# Patient Record
Sex: FEMALE | Race: WHITE | Employment: OTHER | URBAN - METROPOLITAN AREA
[De-identification: names, ages, dates, MRNs, and addresses within clinical notes are randomized per-mention and may not be internally consistent; named-entity substitution may affect disease eponyms.]

---

## 2017-02-15 ENCOUNTER — OFFICE VISIT (OUTPATIENT)
Dept: FAMILY MEDICINE CLINIC | Age: 72
End: 2017-02-15

## 2017-02-15 VITALS
SYSTOLIC BLOOD PRESSURE: 124 MMHG | HEART RATE: 66 BPM | HEIGHT: 64 IN | DIASTOLIC BLOOD PRESSURE: 80 MMHG | WEIGHT: 108 LBS | BODY MASS INDEX: 18.44 KG/M2 | RESPIRATION RATE: 20 BRPM

## 2017-02-15 DIAGNOSIS — E53.8 B12 DEFICIENCY: ICD-10-CM

## 2017-02-15 DIAGNOSIS — E78.00 PURE HYPERCHOLESTEROLEMIA: Primary | ICD-10-CM

## 2017-02-15 DIAGNOSIS — M81.0 OSTEOPOROSIS: ICD-10-CM

## 2017-02-15 DIAGNOSIS — Z85.3 HX OF BREAST CANCER: ICD-10-CM

## 2017-02-15 DIAGNOSIS — F17.200 TOBACCO USE DISORDER: ICD-10-CM

## 2017-02-15 DIAGNOSIS — F31.9 BIPOLAR 1 DISORDER (HCC): ICD-10-CM

## 2017-02-15 DIAGNOSIS — E55.9 VITAMIN D DEFICIENCY: ICD-10-CM

## 2017-02-15 DIAGNOSIS — E03.9 ACQUIRED HYPOTHYROIDISM: ICD-10-CM

## 2017-02-15 DIAGNOSIS — R60.0 BILATERAL EDEMA OF LOWER EXTREMITY: ICD-10-CM

## 2017-02-15 DIAGNOSIS — F10.11 NONDEPENDENT ALCOHOL ABUSE, IN REMISSION: ICD-10-CM

## 2017-02-15 DIAGNOSIS — E78.00 PURE HYPERCHOLESTEROLEMIA: ICD-10-CM

## 2017-02-15 LAB
A/G RATIO: 2.6 (ref 1.1–2.2)
ALBUMIN SERPL-MCNC: 5 G/DL (ref 3.4–5)
ALP BLD-CCNC: 82 U/L (ref 40–129)
ALT SERPL-CCNC: 10 U/L (ref 10–40)
ANION GAP SERPL CALCULATED.3IONS-SCNC: 13 MMOL/L (ref 3–16)
AST SERPL-CCNC: 17 U/L (ref 15–37)
BILIRUB SERPL-MCNC: 0.3 MG/DL (ref 0–1)
BUN BLDV-MCNC: 13 MG/DL (ref 7–20)
CALCIUM SERPL-MCNC: 9.9 MG/DL (ref 8.3–10.6)
CHLORIDE BLD-SCNC: 103 MMOL/L (ref 99–110)
CHOLESTEROL, TOTAL: 174 MG/DL (ref 0–199)
CO2: 29 MMOL/L (ref 21–32)
CREAT SERPL-MCNC: 0.8 MG/DL (ref 0.6–1.2)
GFR AFRICAN AMERICAN: >60
GFR NON-AFRICAN AMERICAN: >60
GLOBULIN: 1.9 G/DL
GLUCOSE BLD-MCNC: 77 MG/DL (ref 70–99)
HCT VFR BLD CALC: 45.4 % (ref 36–48)
HDLC SERPL-MCNC: 88 MG/DL (ref 40–60)
HEMOGLOBIN: 14.9 G/DL (ref 12–16)
LDL CHOLESTEROL CALCULATED: 76 MG/DL
MCH RBC QN AUTO: 31.5 PG (ref 26–34)
MCHC RBC AUTO-ENTMCNC: 32.8 G/DL (ref 31–36)
MCV RBC AUTO: 96 FL (ref 80–100)
PDW BLD-RTO: 13.5 % (ref 12.4–15.4)
PLATELET # BLD: 168 K/UL (ref 135–450)
PMV BLD AUTO: 9.8 FL (ref 5–10.5)
POTASSIUM SERPL-SCNC: 4.9 MMOL/L (ref 3.5–5.1)
RBC # BLD: 4.72 M/UL (ref 4–5.2)
SODIUM BLD-SCNC: 145 MMOL/L (ref 136–145)
TOTAL PROTEIN: 6.9 G/DL (ref 6.4–8.2)
TRIGL SERPL-MCNC: 50 MG/DL (ref 0–150)
TSH SERPL DL<=0.05 MIU/L-ACNC: 3.71 UIU/ML (ref 0.27–4.2)
VITAMIN B-12: 497 PG/ML (ref 211–911)
VLDLC SERPL CALC-MCNC: 10 MG/DL
WBC # BLD: 5.7 K/UL (ref 4–11)

## 2017-02-15 PROCEDURE — G8484 FLU IMMUNIZE NO ADMIN: HCPCS | Performed by: FAMILY MEDICINE

## 2017-02-15 PROCEDURE — G8427 DOCREV CUR MEDS BY ELIG CLIN: HCPCS | Performed by: FAMILY MEDICINE

## 2017-02-15 PROCEDURE — 4004F PT TOBACCO SCREEN RCVD TLK: CPT | Performed by: FAMILY MEDICINE

## 2017-02-15 PROCEDURE — 99214 OFFICE O/P EST MOD 30 MIN: CPT | Performed by: FAMILY MEDICINE

## 2017-02-15 PROCEDURE — G8419 CALC BMI OUT NRM PARAM NOF/U: HCPCS | Performed by: FAMILY MEDICINE

## 2017-02-15 PROCEDURE — G8399 PT W/DXA RESULTS DOCUMENT: HCPCS | Performed by: FAMILY MEDICINE

## 2017-02-15 PROCEDURE — 1123F ACP DISCUSS/DSCN MKR DOCD: CPT | Performed by: FAMILY MEDICINE

## 2017-02-15 PROCEDURE — 4005F PHARM THX FOR OP RXD: CPT | Performed by: FAMILY MEDICINE

## 2017-02-15 PROCEDURE — 3014F SCREEN MAMMO DOC REV: CPT | Performed by: FAMILY MEDICINE

## 2017-02-15 PROCEDURE — 4040F PNEUMOC VAC/ADMIN/RCVD: CPT | Performed by: FAMILY MEDICINE

## 2017-02-15 PROCEDURE — 3017F COLORECTAL CA SCREEN DOC REV: CPT | Performed by: FAMILY MEDICINE

## 2017-02-15 PROCEDURE — 1090F PRES/ABSN URINE INCON ASSESS: CPT | Performed by: FAMILY MEDICINE

## 2017-07-10 ENCOUNTER — TELEPHONE (OUTPATIENT)
Dept: FAMILY MEDICINE CLINIC | Age: 72
End: 2017-07-10

## 2017-07-31 ENCOUNTER — OFFICE VISIT (OUTPATIENT)
Dept: FAMILY MEDICINE CLINIC | Age: 72
End: 2017-07-31

## 2017-07-31 VITALS
HEIGHT: 64 IN | DIASTOLIC BLOOD PRESSURE: 60 MMHG | SYSTOLIC BLOOD PRESSURE: 118 MMHG | HEART RATE: 80 BPM | WEIGHT: 100 LBS | RESPIRATION RATE: 22 BRPM | BODY MASS INDEX: 17.07 KG/M2

## 2017-07-31 DIAGNOSIS — R60.0 BILATERAL EDEMA OF LOWER EXTREMITY: ICD-10-CM

## 2017-07-31 DIAGNOSIS — E03.9 ACQUIRED HYPOTHYROIDISM: ICD-10-CM

## 2017-07-31 DIAGNOSIS — S06.9X1A: Primary | ICD-10-CM

## 2017-07-31 DIAGNOSIS — M80.00XA OSTEOPOROSIS WITH CURRENT PATHOLOGICAL FRACTURE, UNSPECIFIED OSTEOPOROSIS TYPE, INITIAL ENCOUNTER: ICD-10-CM

## 2017-07-31 DIAGNOSIS — F10.11 NONDEPENDENT ALCOHOL ABUSE, IN REMISSION: ICD-10-CM

## 2017-07-31 DIAGNOSIS — K52.9 CHRONIC DIARRHEA: ICD-10-CM

## 2017-07-31 DIAGNOSIS — E87.6 HYPOKALEMIA: ICD-10-CM

## 2017-07-31 DIAGNOSIS — S02.19XA CLOSED FRACTURE OF TEMPORAL BONE, INITIAL ENCOUNTER (HCC): ICD-10-CM

## 2017-07-31 PROBLEM — S06.9XAA TRAUMATIC BRAIN INJURY (HCC): Status: ACTIVE | Noted: 2017-07-31

## 2017-07-31 PROBLEM — R60.9 CHRONIC EDEMA: Status: ACTIVE | Noted: 2017-07-31

## 2017-07-31 PROBLEM — R60.9 CHRONIC EDEMA: Status: RESOLVED | Noted: 2017-07-31 | Resolved: 2017-07-31

## 2017-07-31 LAB
ANION GAP SERPL CALCULATED.3IONS-SCNC: 19 MMOL/L (ref 3–16)
BUN BLDV-MCNC: 11 MG/DL (ref 7–20)
CALCIUM SERPL-MCNC: 9.2 MG/DL (ref 8.3–10.6)
CHLORIDE BLD-SCNC: 101 MMOL/L (ref 99–110)
CO2: 24 MMOL/L (ref 21–32)
CREAT SERPL-MCNC: 0.8 MG/DL (ref 0.6–1.2)
GFR AFRICAN AMERICAN: >60
GFR NON-AFRICAN AMERICAN: >60
GLUCOSE BLD-MCNC: 89 MG/DL (ref 70–99)
POTASSIUM SERPL-SCNC: 4.6 MMOL/L (ref 3.5–5.1)
SODIUM BLD-SCNC: 144 MMOL/L (ref 136–145)
TSH SERPL DL<=0.05 MIU/L-ACNC: 2.75 UIU/ML (ref 0.27–4.2)

## 2017-07-31 PROCEDURE — G8419 CALC BMI OUT NRM PARAM NOF/U: HCPCS | Performed by: FAMILY MEDICINE

## 2017-07-31 PROCEDURE — 3017F COLORECTAL CA SCREEN DOC REV: CPT | Performed by: FAMILY MEDICINE

## 2017-07-31 PROCEDURE — 99214 OFFICE O/P EST MOD 30 MIN: CPT | Performed by: FAMILY MEDICINE

## 2017-07-31 PROCEDURE — 3014F SCREEN MAMMO DOC REV: CPT | Performed by: FAMILY MEDICINE

## 2017-07-31 PROCEDURE — G8427 DOCREV CUR MEDS BY ELIG CLIN: HCPCS | Performed by: FAMILY MEDICINE

## 2017-07-31 PROCEDURE — 4005F PHARM THX FOR OP RXD: CPT | Performed by: FAMILY MEDICINE

## 2017-07-31 PROCEDURE — G8399 PT W/DXA RESULTS DOCUMENT: HCPCS | Performed by: FAMILY MEDICINE

## 2017-07-31 PROCEDURE — 1090F PRES/ABSN URINE INCON ASSESS: CPT | Performed by: FAMILY MEDICINE

## 2017-07-31 PROCEDURE — 4040F PNEUMOC VAC/ADMIN/RCVD: CPT | Performed by: FAMILY MEDICINE

## 2017-07-31 PROCEDURE — 4004F PT TOBACCO SCREEN RCVD TLK: CPT | Performed by: FAMILY MEDICINE

## 2017-07-31 PROCEDURE — 1123F ACP DISCUSS/DSCN MKR DOCD: CPT | Performed by: FAMILY MEDICINE

## 2017-07-31 RX ORDER — TRAMADOL HYDROCHLORIDE 50 MG/1
50 TABLET ORAL EVERY 6 HOURS PRN
COMMUNITY
End: 2017-07-31

## 2017-07-31 RX ORDER — ALENDRONATE SODIUM 70 MG/1
70 TABLET ORAL
Qty: 4 TABLET | Refills: 5 | Status: SHIPPED | OUTPATIENT
Start: 2017-07-31 | End: 2018-06-18

## 2017-08-02 LAB — CELIAC PANEL: 4 UNITS (ref 0–19)

## 2017-08-03 ENCOUNTER — TELEPHONE (OUTPATIENT)
Dept: FAMILY MEDICINE CLINIC | Age: 72
End: 2017-08-03

## 2017-08-07 ENCOUNTER — OFFICE VISIT (OUTPATIENT)
Dept: FAMILY MEDICINE CLINIC | Age: 72
End: 2017-08-07

## 2017-08-07 VITALS
DIASTOLIC BLOOD PRESSURE: 72 MMHG | HEART RATE: 80 BPM | SYSTOLIC BLOOD PRESSURE: 114 MMHG | RESPIRATION RATE: 14 BRPM | BODY MASS INDEX: 15.54 KG/M2 | TEMPERATURE: 98.6 F | WEIGHT: 91 LBS | HEIGHT: 64 IN

## 2017-08-07 DIAGNOSIS — R29.6 RECURRENT FALLS: Primary | ICD-10-CM

## 2017-08-07 DIAGNOSIS — S01.01XA SCALP LACERATION, INITIAL ENCOUNTER: ICD-10-CM

## 2017-08-07 DIAGNOSIS — R63.4 WEIGHT LOSS: ICD-10-CM

## 2017-08-07 DIAGNOSIS — K52.9 CHRONIC DIARRHEA: ICD-10-CM

## 2017-08-07 PROCEDURE — 1090F PRES/ABSN URINE INCON ASSESS: CPT | Performed by: FAMILY MEDICINE

## 2017-08-07 PROCEDURE — 3017F COLORECTAL CA SCREEN DOC REV: CPT | Performed by: FAMILY MEDICINE

## 2017-08-07 PROCEDURE — 3014F SCREEN MAMMO DOC REV: CPT | Performed by: FAMILY MEDICINE

## 2017-08-07 PROCEDURE — 99213 OFFICE O/P EST LOW 20 MIN: CPT | Performed by: FAMILY MEDICINE

## 2017-08-07 PROCEDURE — G8419 CALC BMI OUT NRM PARAM NOF/U: HCPCS | Performed by: FAMILY MEDICINE

## 2017-08-07 PROCEDURE — G8427 DOCREV CUR MEDS BY ELIG CLIN: HCPCS | Performed by: FAMILY MEDICINE

## 2017-08-07 PROCEDURE — 4004F PT TOBACCO SCREEN RCVD TLK: CPT | Performed by: FAMILY MEDICINE

## 2017-08-07 PROCEDURE — 90714 TD VACC NO PRESV 7 YRS+ IM: CPT | Performed by: FAMILY MEDICINE

## 2017-08-07 PROCEDURE — G8399 PT W/DXA RESULTS DOCUMENT: HCPCS | Performed by: FAMILY MEDICINE

## 2017-08-07 PROCEDURE — 1123F ACP DISCUSS/DSCN MKR DOCD: CPT | Performed by: FAMILY MEDICINE

## 2017-08-07 PROCEDURE — 4040F PNEUMOC VAC/ADMIN/RCVD: CPT | Performed by: FAMILY MEDICINE

## 2017-08-07 PROCEDURE — 90471 IMMUNIZATION ADMIN: CPT | Performed by: FAMILY MEDICINE

## 2017-08-08 ENCOUNTER — TELEPHONE (OUTPATIENT)
Dept: FAMILY MEDICINE CLINIC | Age: 72
End: 2017-08-08

## 2017-08-09 ENCOUNTER — TELEPHONE (OUTPATIENT)
Dept: FAMILY MEDICINE CLINIC | Age: 72
End: 2017-08-09

## 2017-08-09 DIAGNOSIS — T14.8XXA SKIN AVULSION: ICD-10-CM

## 2017-08-09 DIAGNOSIS — R29.6 FREQUENT FALLS: Primary | ICD-10-CM

## 2017-08-10 ENCOUNTER — HOSPITAL ENCOUNTER (OUTPATIENT)
Dept: OTHER | Age: 72
Discharge: HOME | End: 2017-08-10
Attending: INTERNAL MEDICINE | Admitting: INTERNAL MEDICINE

## 2017-08-11 ENCOUNTER — TELEPHONE (OUTPATIENT)
Dept: FAMILY MEDICINE CLINIC | Age: 72
End: 2017-08-11

## 2017-08-14 ENCOUNTER — CARE COORDINATION (OUTPATIENT)
Dept: CARE COORDINATION | Age: 72
End: 2017-08-14

## 2017-09-05 ENCOUNTER — OFFICE VISIT (OUTPATIENT)
Dept: FAMILY MEDICINE CLINIC | Age: 72
End: 2017-09-05

## 2017-09-05 VITALS
DIASTOLIC BLOOD PRESSURE: 56 MMHG | TEMPERATURE: 97.8 F | RESPIRATION RATE: 18 BRPM | SYSTOLIC BLOOD PRESSURE: 98 MMHG | WEIGHT: 103 LBS | HEART RATE: 59 BPM | BODY MASS INDEX: 18.84 KG/M2

## 2017-09-05 DIAGNOSIS — S12.501D CLOSED NONDISPLACED FRACTURE OF SIXTH CERVICAL VERTEBRA WITH ROUTINE HEALING, UNSPECIFIED FRACTURE MORPHOLOGY, SUBSEQUENT ENCOUNTER: ICD-10-CM

## 2017-09-05 DIAGNOSIS — M80.00XD AGE-RELATED OSTEOPOROSIS WITH CURRENT PATHOLOGICAL FRACTURE WITH ROUTINE HEALING, SUBSEQUENT ENCOUNTER: ICD-10-CM

## 2017-09-05 DIAGNOSIS — I95.9 HYPOTENSION, UNSPECIFIED HYPOTENSION TYPE: ICD-10-CM

## 2017-09-05 DIAGNOSIS — I21.4 NSTEMI (NON-ST ELEVATED MYOCARDIAL INFARCTION) (HCC): ICD-10-CM

## 2017-09-05 DIAGNOSIS — M80.00XD OSTEOPOROSIS WITH CURRENT PATHOLOGICAL FRACTURE WITH ROUTINE HEALING, UNSPECIFIED OSTEOPOROSIS TYPE, SUBSEQUENT ENCOUNTER: ICD-10-CM

## 2017-09-05 DIAGNOSIS — S06.9X0D: ICD-10-CM

## 2017-09-05 DIAGNOSIS — S02.19XD CLOSED FRACTURE OF TEMPORAL BONE WITH ROUTINE HEALING, SUBSEQUENT ENCOUNTER: ICD-10-CM

## 2017-09-05 DIAGNOSIS — R29.6 FREQUENT FALLS: Primary | ICD-10-CM

## 2017-09-05 PROBLEM — K52.9 CHRONIC DIARRHEA: Status: RESOLVED | Noted: 2017-07-31 | Resolved: 2017-09-05

## 2017-09-05 PROCEDURE — G8420 CALC BMI NORM PARAMETERS: HCPCS | Performed by: FAMILY MEDICINE

## 2017-09-05 PROCEDURE — 3014F SCREEN MAMMO DOC REV: CPT | Performed by: FAMILY MEDICINE

## 2017-09-05 PROCEDURE — 4004F PT TOBACCO SCREEN RCVD TLK: CPT | Performed by: FAMILY MEDICINE

## 2017-09-05 PROCEDURE — G8427 DOCREV CUR MEDS BY ELIG CLIN: HCPCS | Performed by: FAMILY MEDICINE

## 2017-09-05 PROCEDURE — 3017F COLORECTAL CA SCREEN DOC REV: CPT | Performed by: FAMILY MEDICINE

## 2017-09-05 PROCEDURE — 1123F ACP DISCUSS/DSCN MKR DOCD: CPT | Performed by: FAMILY MEDICINE

## 2017-09-05 PROCEDURE — 99215 OFFICE O/P EST HI 40 MIN: CPT | Performed by: FAMILY MEDICINE

## 2017-09-05 PROCEDURE — 4040F PNEUMOC VAC/ADMIN/RCVD: CPT | Performed by: FAMILY MEDICINE

## 2017-09-05 PROCEDURE — G8399 PT W/DXA RESULTS DOCUMENT: HCPCS | Performed by: FAMILY MEDICINE

## 2017-09-05 PROCEDURE — 4005F PHARM THX FOR OP RXD: CPT | Performed by: FAMILY MEDICINE

## 2017-09-05 PROCEDURE — 1090F PRES/ABSN URINE INCON ASSESS: CPT | Performed by: FAMILY MEDICINE

## 2017-09-05 RX ORDER — MIRTAZAPINE 7.5 MG/1
7.5 TABLET, FILM COATED ORAL NIGHTLY
Qty: 30 TABLET | Refills: 3 | COMMUNITY
Start: 2017-09-05 | End: 2019-03-04

## 2017-09-05 RX ORDER — ZIPRASIDONE HYDROCHLORIDE 40 MG/1
40 CAPSULE ORAL 2 TIMES DAILY WITH MEALS
Qty: 60 CAPSULE | Refills: 3 | COMMUNITY
Start: 2017-09-05 | End: 2019-03-04

## 2017-09-08 ENCOUNTER — TELEPHONE (OUTPATIENT)
Dept: FAMILY MEDICINE CLINIC | Age: 72
End: 2017-09-08

## 2017-09-12 ENCOUNTER — TELEPHONE (OUTPATIENT)
Dept: FAMILY MEDICINE CLINIC | Age: 72
End: 2017-09-12

## 2017-09-15 ENCOUNTER — TELEPHONE (OUTPATIENT)
Dept: FAMILY MEDICINE CLINIC | Age: 72
End: 2017-09-15

## 2017-09-20 ENCOUNTER — CARE COORDINATION (OUTPATIENT)
Dept: CARE COORDINATION | Age: 72
End: 2017-09-20

## 2017-09-21 ENCOUNTER — OFFICE VISIT (OUTPATIENT)
Dept: ORTHOPEDIC SURGERY | Age: 72
End: 2017-09-21

## 2017-09-21 VITALS — BODY MASS INDEX: 18.95 KG/M2 | WEIGHT: 103 LBS | HEIGHT: 62 IN | TEMPERATURE: 97.7 F

## 2017-09-21 DIAGNOSIS — S82.61XA CLOSED DISPLACED FRACTURE OF LATERAL MALLEOLUS OF RIGHT FIBULA, INITIAL ENCOUNTER: Primary | ICD-10-CM

## 2017-09-21 DIAGNOSIS — R29.6 RECURRENT FALLS: ICD-10-CM

## 2017-09-21 PROCEDURE — G8399 PT W/DXA RESULTS DOCUMENT: HCPCS | Performed by: ORTHOPAEDIC SURGERY

## 2017-09-21 PROCEDURE — 3017F COLORECTAL CA SCREEN DOC REV: CPT | Performed by: ORTHOPAEDIC SURGERY

## 2017-09-21 PROCEDURE — 4004F PT TOBACCO SCREEN RCVD TLK: CPT | Performed by: ORTHOPAEDIC SURGERY

## 2017-09-21 PROCEDURE — G8427 DOCREV CUR MEDS BY ELIG CLIN: HCPCS | Performed by: ORTHOPAEDIC SURGERY

## 2017-09-21 PROCEDURE — L4361 PNEUMA/VAC WALK BOOT PRE OTS: HCPCS | Performed by: ORTHOPAEDIC SURGERY

## 2017-09-21 PROCEDURE — 1090F PRES/ABSN URINE INCON ASSESS: CPT | Performed by: ORTHOPAEDIC SURGERY

## 2017-09-21 PROCEDURE — 1123F ACP DISCUSS/DSCN MKR DOCD: CPT | Performed by: ORTHOPAEDIC SURGERY

## 2017-09-21 PROCEDURE — G8420 CALC BMI NORM PARAMETERS: HCPCS | Performed by: ORTHOPAEDIC SURGERY

## 2017-09-21 PROCEDURE — 3014F SCREEN MAMMO DOC REV: CPT | Performed by: ORTHOPAEDIC SURGERY

## 2017-09-21 PROCEDURE — 4040F PNEUMOC VAC/ADMIN/RCVD: CPT | Performed by: ORTHOPAEDIC SURGERY

## 2017-09-21 PROCEDURE — 99214 OFFICE O/P EST MOD 30 MIN: CPT | Performed by: ORTHOPAEDIC SURGERY

## 2017-09-26 ENCOUNTER — OFFICE VISIT (OUTPATIENT)
Dept: CARDIOLOGY CLINIC | Age: 72
End: 2017-09-26

## 2017-09-26 VITALS
WEIGHT: 104 LBS | HEIGHT: 62 IN | OXYGEN SATURATION: 97 % | SYSTOLIC BLOOD PRESSURE: 90 MMHG | BODY MASS INDEX: 19.14 KG/M2 | HEART RATE: 64 BPM | DIASTOLIC BLOOD PRESSURE: 46 MMHG

## 2017-09-26 DIAGNOSIS — I70.0 AORTIC ATHEROSCLEROSIS (HCC): ICD-10-CM

## 2017-09-26 DIAGNOSIS — I95.9 HYPOTENSION, UNSPECIFIED HYPOTENSION TYPE: ICD-10-CM

## 2017-09-26 DIAGNOSIS — R77.8 ELEVATED TROPONIN: ICD-10-CM

## 2017-09-26 DIAGNOSIS — R29.6 RECURRENT FALLS: Primary | ICD-10-CM

## 2017-09-26 PROCEDURE — 1036F TOBACCO NON-USER: CPT | Performed by: INTERNAL MEDICINE

## 2017-09-26 PROCEDURE — 1123F ACP DISCUSS/DSCN MKR DOCD: CPT | Performed by: INTERNAL MEDICINE

## 2017-09-26 PROCEDURE — 99204 OFFICE O/P NEW MOD 45 MIN: CPT | Performed by: INTERNAL MEDICINE

## 2017-09-26 PROCEDURE — 3014F SCREEN MAMMO DOC REV: CPT | Performed by: INTERNAL MEDICINE

## 2017-09-26 PROCEDURE — G8399 PT W/DXA RESULTS DOCUMENT: HCPCS | Performed by: INTERNAL MEDICINE

## 2017-09-26 PROCEDURE — 1090F PRES/ABSN URINE INCON ASSESS: CPT | Performed by: INTERNAL MEDICINE

## 2017-09-26 PROCEDURE — 4040F PNEUMOC VAC/ADMIN/RCVD: CPT | Performed by: INTERNAL MEDICINE

## 2017-09-26 PROCEDURE — 3017F COLORECTAL CA SCREEN DOC REV: CPT | Performed by: INTERNAL MEDICINE

## 2017-09-26 PROCEDURE — G8420 CALC BMI NORM PARAMETERS: HCPCS | Performed by: INTERNAL MEDICINE

## 2017-09-26 PROCEDURE — G8427 DOCREV CUR MEDS BY ELIG CLIN: HCPCS | Performed by: INTERNAL MEDICINE

## 2017-09-26 RX ORDER — SODIUM CHLORIDE 1000 MG
1 TABLET, SOLUBLE MISCELLANEOUS 2 TIMES DAILY
COMMUNITY
End: 2017-12-18 | Stop reason: SDUPTHER

## 2017-09-26 RX ORDER — HYDROCODONE BITARTRATE AND ACETAMINOPHEN 5; 325 MG/1; MG/1
1 TABLET ORAL EVERY 6 HOURS PRN
COMMUNITY
End: 2017-12-21

## 2017-09-27 ENCOUNTER — CARE COORDINATION (OUTPATIENT)
Dept: CASE MANAGEMENT | Age: 72
End: 2017-09-27

## 2017-10-05 ENCOUNTER — OFFICE VISIT (OUTPATIENT)
Dept: ORTHOPEDIC SURGERY | Age: 72
End: 2017-10-05

## 2017-10-05 VITALS
SYSTOLIC BLOOD PRESSURE: 120 MMHG | HEIGHT: 62 IN | HEART RATE: 75 BPM | DIASTOLIC BLOOD PRESSURE: 80 MMHG | BODY MASS INDEX: 19.14 KG/M2 | WEIGHT: 104 LBS

## 2017-10-05 DIAGNOSIS — S82.61XD CLOSED DISPLACED FRACTURE OF LATERAL MALLEOLUS OF RIGHT FIBULA WITH ROUTINE HEALING, SUBSEQUENT ENCOUNTER: Primary | ICD-10-CM

## 2017-10-05 PROCEDURE — 1123F ACP DISCUSS/DSCN MKR DOCD: CPT | Performed by: ORTHOPAEDIC SURGERY

## 2017-10-05 PROCEDURE — 1036F TOBACCO NON-USER: CPT | Performed by: ORTHOPAEDIC SURGERY

## 2017-10-05 PROCEDURE — 3014F SCREEN MAMMO DOC REV: CPT | Performed by: ORTHOPAEDIC SURGERY

## 2017-10-05 PROCEDURE — G8484 FLU IMMUNIZE NO ADMIN: HCPCS | Performed by: ORTHOPAEDIC SURGERY

## 2017-10-05 PROCEDURE — 1090F PRES/ABSN URINE INCON ASSESS: CPT | Performed by: ORTHOPAEDIC SURGERY

## 2017-10-05 PROCEDURE — G8427 DOCREV CUR MEDS BY ELIG CLIN: HCPCS | Performed by: ORTHOPAEDIC SURGERY

## 2017-10-05 PROCEDURE — G8399 PT W/DXA RESULTS DOCUMENT: HCPCS | Performed by: ORTHOPAEDIC SURGERY

## 2017-10-05 PROCEDURE — 99213 OFFICE O/P EST LOW 20 MIN: CPT | Performed by: ORTHOPAEDIC SURGERY

## 2017-10-05 PROCEDURE — 3017F COLORECTAL CA SCREEN DOC REV: CPT | Performed by: ORTHOPAEDIC SURGERY

## 2017-10-05 PROCEDURE — G8420 CALC BMI NORM PARAMETERS: HCPCS | Performed by: ORTHOPAEDIC SURGERY

## 2017-10-05 PROCEDURE — 4040F PNEUMOC VAC/ADMIN/RCVD: CPT | Performed by: ORTHOPAEDIC SURGERY

## 2017-10-05 NOTE — PROGRESS NOTES
ORTHOPAEDIC PROGRESS NOTE    Chief Complaint   Patient presents with    Follow-up     Rt ankle DOI 9/15/17       HPI  Returns for right ankle FX  At [address] collar  Boot okay, coming out TID  No skin breakdown  Ankle no pain  Not very active at baseline, minimally ambulatory    Denies N/T    Vitals:    10/05/17 1301   BP: 120/80   Pulse: 75   Weight: 104 lb (47.2 kg)   Height: 5' 2\" (1.575 m)       Physical Exam  NAD  Alert and oriented  SILT SP/DP/T/sural/saphenous nerve distributions; EHL/TA/GS intact  Skin intact, no ulcers or wounds  No TTP lateral malleolus, medial malleolus    Imaging:  Images were personally reviewed by myself and discussed with the patient  Right ankle 3 views - interval minimal displacement, however, this may just be difference in positioning/projection then true displacement. No significant healing seen of lateral malleolus fracture. No medial clear space widening. Assessment & Plan:  71 y.o. female following up for  1. Closed displaced fracture of lateral malleolus of right fibula with routine healing, subsequent encounter  XR ANKLE RIGHT (MIN 3 VIEWS)       No orders of the defined types were placed in this encounter.     She is doing well  Continue nonop treatment  NWB, boot  Come out at least TID to prevent pressure sores  FU 4 weeks with new XRs    Lovena Decatur

## 2017-10-05 NOTE — MR AVS SNAPSHOT
After Visit Summary             Luis Blanco   10/5/2017 1:15 PM   Office Visit    Description:  Female : 1945   Provider:  Jil Reyes MD   Department:  [address] [facility]              Your Follow-Up and Future Appointments         Below is a list of your follow-up and future appointments. This may not be a complete list as you may have made appointments directly with providers that we are not aware of or your providers may have made some for you. Please call your providers to confirm appointments. It is important to keep your appointments. Please bring your current insurance card, photo ID, co-pay, and all medication bottles to your appointment. If self-pay, payment is expected at the time of service. Your To-Do List     Future Appointments Provider Department Dept Phone    2017 10:15 AM Jil Reyes MD [address] [facility] [phone]    Please arrive 15 minutes prior to appointment time, bring insurance card and photo ID.     2017 2:00 PM Andrews Alas MD [address] [place] [address] [phone]    Please arrive 15 minutes prior to appointment, bring photo ID and insurance card.          Information from Your Visit        Department     Name Address Phone Fax    [address] [facility] [address] [facility]) [address] [place] [address] 38. [phone]      You Were Seen for:         Comments    Closed displaced fracture of lateral malleolus of right fibula with routine healing, subsequent encounter   [[id number]]         Vital Signs     Blood Pressure Pulse Height Weight Body Mass Index Smoking Status    120/80 75 5' 2\" (1.575 m) 104 lb (47.2 kg) 19.02 kg/m2 Former Smoker         Medications and Orders      Your Current Medications Are              sodium chloride 1 g tablet Take 1 g by mouth 2 times daily HYDROcodone-acetaminophen (NORCO) 5-325 MG per tablet Take 1 tablet by mouth every 6 hours as needed for Pain .    mirtazapine (REMERON) 7.5 MG tablet Take 1 tablet by mouth nightly    ziprasidone (GEODON) 40 MG capsule Take 1 capsule by mouth 2 times daily (with meals)    alendronate (FOSAMAX) 70 MG tablet Take 1 tablet by mouth every 7 days (take on empty stomach, remain upright for 1 hour)    pravastatin (PRAVACHOL) 20 MG tablet TAKE 1 TABLET BY MOUTH ONE TIME A DAY     levothyroxine (SYNTHROID) 88 MCG tablet TAKE 1 TABLET BY MOUTH ONE TIME A DAY     calcium carbonate (OSCAL) 500 MG TABS tablet Take 500 mg by mouth 2 times daily. fluoxetine (PROZAC) 20 MG capsule Take 20 mg by mouth daily. lamotrigine (LAMICTAL) 100 MG tablet Take 100 mg by mouth daily. trazodone (DESYREL) 100 MG tablet Take 100 mg by mouth nightly. VITAMIN D PO Take 1,000 Units by mouth. Pediatric Multivitamins-Fl (MULTI VIT/FL PO) Take  by mouth.       Allergies              Codeine       We Ordered/Performed the following           XR ANKLE RIGHT (MIN 3 VIEWS)     Comments:    Room [facility]         Result Summary for XR ANKLE RIGHT (MIN 3 VIEWS)      Result Information     Status          Final result (Exam End: 10/5/2017  1:19 PM)           10/5/2017  1:19 PM      Narrative & Impression           Radiology result is complete; follow up with provider / physician office for radiology results                       Additional Information        Basic Information     Date Of Birth Sex Race Ethnicity Preferred Language    1945 Female White Non-/Non  English      Problem List as of 10/5/2017  Date Reviewed: 10/5/2017                Hyperlipidemia    Osteoporosis    Vitamin D deficiency    Frequent falls    Closed nondisplaced fracture of sixth cervical vertebra with routine healing    Hypotension    Weight loss    Closed fracture of temporal bone ([address]    Traumatic brain injury ([address]

## 2017-10-16 ENCOUNTER — CARE COORDINATION (OUTPATIENT)
Dept: CASE MANAGEMENT | Age: 72
End: 2017-10-16

## 2017-10-16 NOTE — CARE COORDINATION
Called skilled [facility]  for a patient update. Nurse Radha Enamorado reports patient is doing well, eating and drinking, no c/o pain, Tylenol and Norco available, has a removal boot on RLE. The next other appt. 11/7/17. Adama Childress reports patient discharged from PT/OT d/t [facility] on 10/11/17, currently working with restorative, until ortho appt then re-evaluate. Discharge plans depends on progress, previously independent living. Normally high functioning. Barriers when alone multiple falls. Family involved. Post acute transition coordinator will continue.  Luis Felipe Kenyon, ALMAN  [facility] Transition Coordinator  [phone]

## 2017-11-06 ENCOUNTER — CARE COORDINATION (OUTPATIENT)
Dept: CASE MANAGEMENT | Age: 72
End: 2017-11-06

## 2017-11-07 ENCOUNTER — OFFICE VISIT (OUTPATIENT)
Dept: ORTHOPEDIC SURGERY | Age: 72
End: 2017-11-07

## 2017-11-07 VITALS
HEIGHT: 62 IN | BODY MASS INDEX: 19.15 KG/M2 | WEIGHT: 104.06 LBS | DIASTOLIC BLOOD PRESSURE: 55 MMHG | HEART RATE: 58 BPM | SYSTOLIC BLOOD PRESSURE: 98 MMHG

## 2017-11-07 DIAGNOSIS — S82.61XD CLOSED DISPLACED FRACTURE OF LATERAL MALLEOLUS OF RIGHT FIBULA WITH ROUTINE HEALING, SUBSEQUENT ENCOUNTER: Primary | ICD-10-CM

## 2017-11-07 PROCEDURE — G8420 CALC BMI NORM PARAMETERS: HCPCS | Performed by: ORTHOPAEDIC SURGERY

## 2017-11-07 PROCEDURE — 1036F TOBACCO NON-USER: CPT | Performed by: ORTHOPAEDIC SURGERY

## 2017-11-07 PROCEDURE — 4040F PNEUMOC VAC/ADMIN/RCVD: CPT | Performed by: ORTHOPAEDIC SURGERY

## 2017-11-07 PROCEDURE — 1123F ACP DISCUSS/DSCN MKR DOCD: CPT | Performed by: ORTHOPAEDIC SURGERY

## 2017-11-07 PROCEDURE — 99213 OFFICE O/P EST LOW 20 MIN: CPT | Performed by: ORTHOPAEDIC SURGERY

## 2017-11-07 PROCEDURE — G8399 PT W/DXA RESULTS DOCUMENT: HCPCS | Performed by: ORTHOPAEDIC SURGERY

## 2017-11-07 PROCEDURE — 3017F COLORECTAL CA SCREEN DOC REV: CPT | Performed by: ORTHOPAEDIC SURGERY

## 2017-11-07 PROCEDURE — G8427 DOCREV CUR MEDS BY ELIG CLIN: HCPCS | Performed by: ORTHOPAEDIC SURGERY

## 2017-11-07 PROCEDURE — 3014F SCREEN MAMMO DOC REV: CPT | Performed by: ORTHOPAEDIC SURGERY

## 2017-11-07 PROCEDURE — 1090F PRES/ABSN URINE INCON ASSESS: CPT | Performed by: ORTHOPAEDIC SURGERY

## 2017-11-07 PROCEDURE — G8599 NO ASA/ANTIPLAT THER USE RNG: HCPCS | Performed by: ORTHOPAEDIC SURGERY

## 2017-11-07 PROCEDURE — G8484 FLU IMMUNIZE NO ADMIN: HCPCS | Performed by: ORTHOPAEDIC SURGERY

## 2017-11-07 NOTE — PROGRESS NOTES
ORTHOPAEDIC PROGRESS NOTE    Chief Complaint   Patient presents with    Follow-up     Right ankle. Pain 0/10. HPI  Returns for right ankle FX  At [facility] DR MARIA GUADALUPE GUERRERO  Right ankle doing well  No pain  NWB  Using boot    Denies N/T    Vitals:    11/07/17 1022   BP: (!) 98/55   Pulse: 58   Weight: 104 lb 0.9 oz (47.2 kg)   Height: 5' 2.01\" (1.575 m)       Physical Exam  NAD  Alert and oriented  SILT SP/DP/T/sural/saphenous nerve distributions; EHL/TA/GS intact  Skin intact, no ulcers or wounds  No TTP lateral malleolus, medial malleolus    Imaging:  Images were personally reviewed by myself and discussed with the patient  Right ankle 3 views - healed lateral malleolus fracture. Mortise intact. Assessment & Plan:  71 y.o. female following up for  1. Closed displaced fracture of lateral malleolus of right fibula with routine healing, subsequent encounter  XR ANKLE RIGHT (MIN 3 VIEWS)       No orders of the defined types were placed in this encounter. Fracture is progressing well, healing/healed on XRs  Started 50% WB, progress by 25% weekly  Should be full WB in boot by 3-4 weeks  After that, if tolerating well, transition to regular comfortable supportive shoes.   Come out of boot more liberally  Don't have to sleep in it  FU PRN    Adriana Moyer

## 2017-11-08 ENCOUNTER — TELEPHONE (OUTPATIENT)
Dept: ORTHOPEDIC SURGERY | Age: 72
End: 2017-11-08

## 2017-12-05 ENCOUNTER — OFFICE VISIT (OUTPATIENT)
Dept: FAMILY MEDICINE CLINIC | Age: 72
End: 2017-12-05

## 2017-12-05 VITALS
DIASTOLIC BLOOD PRESSURE: 56 MMHG | SYSTOLIC BLOOD PRESSURE: 98 MMHG | WEIGHT: 115 LBS | BODY MASS INDEX: 21.16 KG/M2 | HEART RATE: 60 BPM | HEIGHT: 62 IN | RESPIRATION RATE: 12 BRPM | TEMPERATURE: 97.7 F

## 2017-12-05 DIAGNOSIS — F17.200 TOBACCO USE DISORDER: ICD-10-CM

## 2017-12-05 DIAGNOSIS — I95.9 HYPOTENSION, UNSPECIFIED HYPOTENSION TYPE: ICD-10-CM

## 2017-12-05 DIAGNOSIS — F31.9 BIPOLAR 1 DISORDER (HCC): ICD-10-CM

## 2017-12-05 DIAGNOSIS — R63.4 WEIGHT LOSS: ICD-10-CM

## 2017-12-05 DIAGNOSIS — R29.6 FREQUENT FALLS: Primary | ICD-10-CM

## 2017-12-05 DIAGNOSIS — S12.501D CLOSED NONDISPLACED FRACTURE OF SIXTH CERVICAL VERTEBRA WITH ROUTINE HEALING, UNSPECIFIED FRACTURE MORPHOLOGY, SUBSEQUENT ENCOUNTER: ICD-10-CM

## 2017-12-05 DIAGNOSIS — R60.0 BILATERAL EDEMA OF LOWER EXTREMITY: ICD-10-CM

## 2017-12-05 DIAGNOSIS — E03.9 ACQUIRED HYPOTHYROIDISM: ICD-10-CM

## 2017-12-05 PROCEDURE — G8399 PT W/DXA RESULTS DOCUMENT: HCPCS | Performed by: FAMILY MEDICINE

## 2017-12-05 PROCEDURE — 4040F PNEUMOC VAC/ADMIN/RCVD: CPT | Performed by: FAMILY MEDICINE

## 2017-12-05 PROCEDURE — G8484 FLU IMMUNIZE NO ADMIN: HCPCS | Performed by: FAMILY MEDICINE

## 2017-12-05 PROCEDURE — 1036F TOBACCO NON-USER: CPT | Performed by: FAMILY MEDICINE

## 2017-12-05 PROCEDURE — 1123F ACP DISCUSS/DSCN MKR DOCD: CPT | Performed by: FAMILY MEDICINE

## 2017-12-05 PROCEDURE — G8599 NO ASA/ANTIPLAT THER USE RNG: HCPCS | Performed by: FAMILY MEDICINE

## 2017-12-05 PROCEDURE — G8427 DOCREV CUR MEDS BY ELIG CLIN: HCPCS | Performed by: FAMILY MEDICINE

## 2017-12-05 PROCEDURE — 99214 OFFICE O/P EST MOD 30 MIN: CPT | Performed by: FAMILY MEDICINE

## 2017-12-05 PROCEDURE — 3014F SCREEN MAMMO DOC REV: CPT | Performed by: FAMILY MEDICINE

## 2017-12-05 PROCEDURE — 1090F PRES/ABSN URINE INCON ASSESS: CPT | Performed by: FAMILY MEDICINE

## 2017-12-05 PROCEDURE — G8420 CALC BMI NORM PARAMETERS: HCPCS | Performed by: FAMILY MEDICINE

## 2017-12-05 PROCEDURE — 3017F COLORECTAL CA SCREEN DOC REV: CPT | Performed by: FAMILY MEDICINE

## 2017-12-05 NOTE — PROGRESS NOTES
Subjective:      Patient ID: Tanisha Walker is a 71 y.o. female. Blood pressure (!) 98/56, pulse 60, temperature 97.7 °F (36.5 °C), temperature source Oral, resp. rate 12, height 5' 2.01\" (1.575 m), weight 115 lb (52.2 kg), not currently breastfeeding. HPI here with brother, Kenny Simon. Last seen by me 9/5/17 after a fall and TBI and hospitalization, with return to [facility] after being released from [facility]. She fell again, less than a week later and fractured her R fibula at the ankle. Treated non-operatively. Was on walking boot for 7 weeks, managed by DR Mike Ricardo. She then was back in [facility] rehab for another 2 1/2 months. Is planning to try to return to [facility] soon. Here today for routine check up on her chronic medical problems. Reviewed as below. She has been eating better, not smoking, gained a little weight. Brother thinks she is about as good as he has seen her in recent couple years. Her gait is still wobbly, has poor balance. Working with PT at rehab. Has quad walker that she uses full time. The plan is for NH placement if she falls again. Still sees dr Kolby Davis for bipolar. meds were titrated down for weight and to prevent instability (remeron dose reduce to 7.5, fluox is 20mg, lamictal 50 qd, geodon 40 bid, trazodone 50 qhs). Hypothryoid: on synthroid 88. tsh stable  Lab Results   Component Value Date    TSH 2.75 07/31/2017      On foxamax for osteoporosis. Has several fractures due to falling (neck, ankle, face, shoulder). Also on calcium and vit D (1000mg/units each). Hyperlipidemia: on prav 20. No hx of CAD, TIA, CVA or PVD. Lab Results   Component Value Date    CHOL 174 02/15/2017    TRIG 50 02/15/2017    HDL 88 (H) 02/15/2017    LDLCALC 76 02/15/2017     Lab Results   Component Value Date    ALT 15 09/15/2017    AST 24 09/15/2017        No hx HTN. Is on salt tablets due to LOW blood pressure. Not sure if that has contributed to her falls.      Patient capsule Take 20 mg by mouth daily.  lamotrigine (LAMICTAL) 100 MG tablet Take 100 mg by mouth daily.  trazodone (DESYREL) 100 MG tablet Take 100 mg by mouth nightly.  VITAMIN D PO Take 1,000 Units by mouth.  Pediatric Multivitamins-Fl (MULTI VIT/FL PO) Take  by mouth. No current facility-administered medications for this visit. Immunization History   Administered Date(s) Administered    Influenza Whole 10/19/2009    Pneumococcal 13-valent Conjugate ([id number]) 09/08/2015    Pneumococcal Conjugate 7-valent 06/12/2009    Pneumococcal Polysaccharide ([id number]) 09/24/2013    Td 08/07/2017        Family History   Problem Relation Age of Onset    Cancer Mother      Bone Cancer    Cancer Father      Lung cancer    Retinal Detachment Brother      Social History     Social History    Marital status:      Spouse name: N/A    Number of children: 2    Years of education: N/A     Occupational History    Retired Sub Teacher     Social History Main Topics    Smoking status: Former Smoker     Packs/day: 1.00     Years: 20.00     Types: Cigarettes     Quit date: 9/10/2017    Smokeless tobacco: Never Used    Alcohol use No      Comment: hx alcoholism.  Drug use: No    Sexual activity: Not Currently     Other Topics Concern    Not on file     Social History Narrative    Lives at [address] [place] [address]. Review of Systems No chest pains, dizziness, heart palpitations, dyspnea, lightheadedness, worsening edema. Normal bowel function reported. No urine incontinence or discomfort. Objective:   Physical Exam   Constitutional: She is oriented to person, place, and time. She appears well-developed and well-nourished. No distress. HENT:   Head: Normocephalic and atraumatic. Right Ear: External ear normal.   Left Ear: External ear normal.   Nose: Nose normal.   Mouth/Throat: Oropharynx is clear and moist. No oropharyngeal exudate.    TM's: nl  Canals: nl  Hearing: nl Continue salt tabs. Consider reducing geodon further, with psych's approval    4. Weight loss  - stabilized with current living arrangement in rehab. Brother will look after her weight when she returns to [facility]    5. Bilateral edema of lower extremity  - mild, will not use diuretics for this due to fall risk. 6. Tobacco use disorder  - encoruaged to remain tobacco free once she returns to [facility]    7. Acquired hypothyroidism  - Stable; continue to monitor TSH annually; continue synthroid 88    8. Bipolar 1 disorder ([facility] managed by Dr Sada Lott  - stable on meds. Plan:       F/u with brother a montho or two after returning to [facility].

## 2017-12-13 ENCOUNTER — TELEPHONE (OUTPATIENT)
Dept: FAMILY MEDICINE CLINIC | Age: 72
End: 2017-12-13

## 2017-12-13 NOTE — TELEPHONE ENCOUNTER
Orders from [address] [place] [address] rehab center for home care visits. Needs to see if Dr. Marlena Lainez will sign on the home care.

## 2017-12-18 RX ORDER — SODIUM CHLORIDE 1000 MG
1 TABLET, SOLUBLE MISCELLANEOUS 2 TIMES DAILY
Qty: 60 TABLET | Refills: 3 | Status: SHIPPED | OUTPATIENT
Start: 2017-12-18 | End: 2020-11-17

## 2017-12-19 ENCOUNTER — TELEPHONE (OUTPATIENT)
Dept: FAMILY MEDICINE CLINIC | Age: 72
End: 2017-12-19

## 2017-12-19 NOTE — TELEPHONE ENCOUNTER
Spoke to [facility] and gave message per Dr Eladio Bahena  He is trying to go through social work at nursing home bu is having a hard time  He understands Dr Melly Villanueva is not able to admit but will keep us updated on her condition

## 2017-12-21 ENCOUNTER — TELEPHONE (OUTPATIENT)
Dept: INTERNAL MEDICINE CLINIC | Age: 72
End: 2017-12-21

## 2018-01-08 DIAGNOSIS — S12.9XXD COMPRESSION FRACTURE OF CERVICAL SPINE WITH ROUTINE HEALING, SUBSEQUENT ENCOUNTER: Primary | ICD-10-CM

## 2018-01-08 RX ORDER — HYDROCODONE BITARTRATE AND ACETAMINOPHEN 5; 325 MG/1; MG/1
1 TABLET ORAL EVERY 6 HOURS PRN
Qty: 28 TABLET | Refills: 0 | Status: SHIPPED | OUTPATIENT
Start: 2018-01-08 | End: 2018-01-15

## 2018-03-01 ENCOUNTER — TELEPHONE (OUTPATIENT)
Dept: FAMILY MEDICINE CLINIC | Age: 73
End: 2018-03-01

## 2018-03-01 NOTE — TELEPHONE ENCOUNTER
Brother is calling to inform Dr Violeta Moffett that pt took another fall and they are putting her in permanent nursing home. He wants to know if she is to continue to see Dr. Violeta Moffett as her PCP or does she use the facilities  Now? Pt has an appt on 3/5. Should she keep this?     Please call Tonja Vance

## 2018-03-01 NOTE — TELEPHONE ENCOUNTER
It is totally up to Kelley Dowd and more so the family as to whether I continue to be her doctor. However, I do not make rounds a nursing homes, so she would need to still come to see me here at the office. If that is not feasible, then they should request that the 'house doctor' follow her at the nursing home. Thanks.

## 2018-03-05 ENCOUNTER — OFFICE VISIT (OUTPATIENT)
Dept: FAMILY MEDICINE CLINIC | Age: 73
End: 2018-03-05

## 2018-03-05 VITALS
BODY MASS INDEX: 22.24 KG/M2 | HEIGHT: 61 IN | DIASTOLIC BLOOD PRESSURE: 64 MMHG | WEIGHT: 117.8 LBS | RESPIRATION RATE: 14 BRPM | SYSTOLIC BLOOD PRESSURE: 110 MMHG | HEART RATE: 66 BPM | TEMPERATURE: 97.3 F | OXYGEN SATURATION: 93 %

## 2018-03-05 DIAGNOSIS — E03.9 ACQUIRED HYPOTHYROIDISM: ICD-10-CM

## 2018-03-05 DIAGNOSIS — R29.6 FREQUENT FALLS: ICD-10-CM

## 2018-03-05 DIAGNOSIS — F31.9 BIPOLAR 1 DISORDER (HCC): ICD-10-CM

## 2018-03-05 DIAGNOSIS — E78.00 PURE HYPERCHOLESTEROLEMIA: ICD-10-CM

## 2018-03-05 DIAGNOSIS — F10.11 NONDEPENDENT ALCOHOL ABUSE, IN REMISSION: ICD-10-CM

## 2018-03-05 DIAGNOSIS — M81.0 OSTEOPOROSIS WITHOUT CURRENT PATHOLOGICAL FRACTURE, UNSPECIFIED OSTEOPOROSIS TYPE: Primary | ICD-10-CM

## 2018-03-05 DIAGNOSIS — Z85.3 HX OF BREAST CANCER: ICD-10-CM

## 2018-03-05 DIAGNOSIS — M81.0 OSTEOPOROSIS WITHOUT CURRENT PATHOLOGICAL FRACTURE, UNSPECIFIED OSTEOPOROSIS TYPE: ICD-10-CM

## 2018-03-05 DIAGNOSIS — F17.200 TOBACCO USE DISORDER: ICD-10-CM

## 2018-03-05 PROBLEM — I95.9 HYPOTENSION: Status: RESOLVED | Noted: 2017-09-05 | Resolved: 2018-03-05

## 2018-03-05 PROBLEM — S06.9XAA TRAUMATIC BRAIN INJURY (HCC): Status: RESOLVED | Noted: 2017-07-31 | Resolved: 2018-03-05

## 2018-03-05 PROBLEM — R63.4 WEIGHT LOSS: Status: RESOLVED | Noted: 2017-08-07 | Resolved: 2018-03-05

## 2018-03-05 LAB
A/G RATIO: 2.3 (ref 1.1–2.2)
ALBUMIN SERPL-MCNC: 4.6 G/DL (ref 3.4–5)
ALP BLD-CCNC: 108 U/L (ref 40–129)
ALT SERPL-CCNC: 15 U/L (ref 10–40)
ANION GAP SERPL CALCULATED.3IONS-SCNC: 15 MMOL/L (ref 3–16)
AST SERPL-CCNC: 21 U/L (ref 15–37)
BILIRUB SERPL-MCNC: <0.2 MG/DL (ref 0–1)
BUN BLDV-MCNC: 20 MG/DL (ref 7–20)
CALCIUM SERPL-MCNC: 9.3 MG/DL (ref 8.3–10.6)
CHLORIDE BLD-SCNC: 104 MMOL/L (ref 99–110)
CHOLESTEROL, TOTAL: 180 MG/DL (ref 0–199)
CO2: 27 MMOL/L (ref 21–32)
CREAT SERPL-MCNC: 0.7 MG/DL (ref 0.6–1.2)
GFR AFRICAN AMERICAN: >60
GFR NON-AFRICAN AMERICAN: >60
GLOBULIN: 2 G/DL
GLUCOSE BLD-MCNC: 82 MG/DL (ref 70–99)
HDLC SERPL-MCNC: 89 MG/DL (ref 40–60)
LDL CHOLESTEROL CALCULATED: 72 MG/DL
PARATHYROID HORMONE INTACT: 20 PG/ML (ref 14–72)
POTASSIUM SERPL-SCNC: 4.4 MMOL/L (ref 3.5–5.1)
SODIUM BLD-SCNC: 146 MMOL/L (ref 136–145)
TOTAL PROTEIN: 6.6 G/DL (ref 6.4–8.2)
TRIGL SERPL-MCNC: 93 MG/DL (ref 0–150)
TSH SERPL DL<=0.05 MIU/L-ACNC: 0.38 UIU/ML (ref 0.27–4.2)
VLDLC SERPL CALC-MCNC: 19 MG/DL

## 2018-03-05 PROCEDURE — G8484 FLU IMMUNIZE NO ADMIN: HCPCS | Performed by: FAMILY MEDICINE

## 2018-03-05 PROCEDURE — 3017F COLORECTAL CA SCREEN DOC REV: CPT | Performed by: FAMILY MEDICINE

## 2018-03-05 PROCEDURE — G8399 PT W/DXA RESULTS DOCUMENT: HCPCS | Performed by: FAMILY MEDICINE

## 2018-03-05 PROCEDURE — 1036F TOBACCO NON-USER: CPT | Performed by: FAMILY MEDICINE

## 2018-03-05 PROCEDURE — 4040F PNEUMOC VAC/ADMIN/RCVD: CPT | Performed by: FAMILY MEDICINE

## 2018-03-05 PROCEDURE — 1123F ACP DISCUSS/DSCN MKR DOCD: CPT | Performed by: FAMILY MEDICINE

## 2018-03-05 PROCEDURE — 1090F PRES/ABSN URINE INCON ASSESS: CPT | Performed by: FAMILY MEDICINE

## 2018-03-05 PROCEDURE — 3014F SCREEN MAMMO DOC REV: CPT | Performed by: FAMILY MEDICINE

## 2018-03-05 PROCEDURE — G8420 CALC BMI NORM PARAMETERS: HCPCS | Performed by: FAMILY MEDICINE

## 2018-03-05 PROCEDURE — G8427 DOCREV CUR MEDS BY ELIG CLIN: HCPCS | Performed by: FAMILY MEDICINE

## 2018-03-05 PROCEDURE — 99214 OFFICE O/P EST MOD 30 MIN: CPT | Performed by: FAMILY MEDICINE

## 2018-03-05 RX ORDER — ZOLEDRONIC ACID 5 MG/100ML
5 INJECTION, SOLUTION INTRAVENOUS ONCE
Qty: 100 ML | Refills: 0 | Status: SHIPPED | OUTPATIENT
Start: 2018-03-05 | End: 2018-03-07 | Stop reason: SDUPTHER

## 2018-03-05 NOTE — PROGRESS NOTES
Subjective:      Patient ID: Reji Hawthorne is a 71 y.o. female. Blood pressure 110/64, pulse 66, temperature 97.3 °F (36.3 °C), temperature source Oral, resp. rate 14, height 5' 1\" (1.549 m), weight 117 lb 12.8 oz (53.4 kg), SpO2 93 %, not currently breastfeeding. HPI here with Brother, (twin) Jairo Hutchinson for follow up. She has repeatedly fallen at [facility] and is now in a nursing home, [facility]. Entered there 2 months ago. Has not fallen once since being there. Currently has 24/7 care/supervision. meds provided. They are challenging her to be more motivated and self-challenging. If she can do this, she may be able to move to a less supervised part of the residence. Still sees DR Qasim Carlson for bipolar. He has not changed her meds, sees him q 3 months. On Geodon, trazodone, remeron. Has gained 2 lbs and bmi is now normal.      She has no been able to smoke at her new residence. Using nicotine patch. No alcohol use. Unable to leave the residence without family member with her. Hyperlipidemia: on prav 40. No hx of CAD, TIA, CVA or PVD. Has eaten today (not fasting). Lab Results   Component Value Date    CHOL 174 02/15/2017    TRIG 50 02/15/2017    HDL 88 (H) 02/15/2017    LDLCALC 76 02/15/2017     Lab Results   Component Value Date    ALT 16 12/21/2017    AST 25 12/21/2017         Hypothyroid: on synthroid 88. Weight stable. Last TSH normal 7/17. Lab Results   Component Value Date    TSH 2.75 07/31/2017      Has not had mammogram since 12/16. Wishes to repeat. Osteoporosis: gets Reclast in December, but missed it this past year. Restarted Foxamax in NH in December. She gets 1000 IIU vit D and ca/d 500/200 BID. Last fracture was a foot fracture 9/17. Had had numerous vertebral and other fractures. Normal calcium levels, but has never had PTH testing. Vit D level last tested 9/1`6 and was normal (58).     Patient Active Problem List   Diagnosis    Hyperlipidemia    Osteoporosis

## 2018-03-06 ENCOUNTER — TELEPHONE (OUTPATIENT)
Dept: INTERNAL MEDICINE CLINIC | Age: 73
End: 2018-03-06

## 2018-03-16 ENCOUNTER — HOSPITAL ENCOUNTER (OUTPATIENT)
Dept: INFUSION THERAPY | Age: 73
Discharge: HOME | End: 2018-03-31
Attending: FAMILY MEDICINE | Admitting: FAMILY MEDICINE

## 2018-03-16 VITALS
RESPIRATION RATE: 18 BRPM | OXYGEN SATURATION: 94 % | SYSTOLIC BLOOD PRESSURE: 106 MMHG | DIASTOLIC BLOOD PRESSURE: 70 MMHG | HEIGHT: 62 IN | WEIGHT: 118 LBS | BODY MASS INDEX: 21.71 KG/M2 | HEART RATE: 56 BPM | TEMPERATURE: 97.6 F

## 2018-03-16 RX ORDER — ZOLEDRONIC ACID 5 MG/100ML
5 INJECTION, SOLUTION INTRAVENOUS ONCE
Status: COMPLETED | OUTPATIENT
Start: 2018-03-16 | End: 2018-03-16

## 2018-03-16 RX ORDER — SODIUM CHLORIDE 0.9 % (FLUSH) 0.9 %
10 SYRINGE (ML) INJECTION PRN
Status: DISCONTINUED | OUTPATIENT
Start: 2018-03-16 | End: 2018-04-01 | Stop reason: HOSPADM

## 2018-03-16 RX ADMIN — Medication 10 ML: at 11:28

## 2018-03-16 RX ADMIN — ZOLEDRONIC ACID 5 MG: 5 INJECTION, SOLUTION INTRAVENOUS at 11:28

## 2018-03-19 ENCOUNTER — HOSPITAL ENCOUNTER (OUTPATIENT)
Dept: WOMENS IMAGING | Age: 73
Discharge: HOME | End: 2018-03-19
Admitting: FAMILY MEDICINE

## 2018-03-19 DIAGNOSIS — Z85.3 HX OF BREAST CANCER: ICD-10-CM

## 2018-03-19 DIAGNOSIS — M81.0 OSTEOPOROSIS WITHOUT CURRENT PATHOLOGICAL FRACTURE, UNSPECIFIED OSTEOPOROSIS TYPE: ICD-10-CM

## 2018-04-01 ENCOUNTER — HOSPITAL ENCOUNTER (OUTPATIENT)
Dept: INFUSION THERAPY | Age: 73
Discharge: HOME | End: 2018-04-30
Attending: FAMILY MEDICINE | Admitting: FAMILY MEDICINE

## 2018-04-25 DIAGNOSIS — M54.50 CHRONIC MIDLINE LOW BACK PAIN WITHOUT SCIATICA: Primary | ICD-10-CM

## 2018-04-25 DIAGNOSIS — G89.29 CHRONIC MIDLINE LOW BACK PAIN WITHOUT SCIATICA: Primary | ICD-10-CM

## 2018-04-25 RX ORDER — HYDROCODONE BITARTRATE AND ACETAMINOPHEN 5; 325 MG/1; MG/1
1 TABLET ORAL EVERY 6 HOURS PRN
Qty: 120 TABLET | Refills: 0 | Status: SHIPPED | OUTPATIENT
Start: 2018-04-25 | End: 2018-05-25

## 2018-06-11 ENCOUNTER — TELEPHONE (OUTPATIENT)
Dept: FAMILY MEDICINE CLINIC | Age: 73
End: 2018-06-11

## 2018-06-11 DIAGNOSIS — N39.3 SUI (STRESS URINARY INCONTINENCE, FEMALE): ICD-10-CM

## 2018-06-11 DIAGNOSIS — R46.89 BEHAVIORAL CHANGE: Primary | ICD-10-CM

## 2018-06-16 ENCOUNTER — TELEPHONE (OUTPATIENT)
Dept: FAMILY MEDICINE CLINIC | Age: 73
End: 2018-06-16

## 2018-06-19 ENCOUNTER — TELEPHONE (OUTPATIENT)
Dept: FAMILY MEDICINE CLINIC | Age: 73
End: 2018-06-19

## 2018-06-19 NOTE — TELEPHONE ENCOUNTER
Dora Lawler pt brother got a call this morning from our office and is just returning it.       Please call:  [phone]

## 2018-06-21 ENCOUNTER — TELEPHONE (OUTPATIENT)
Dept: FAMILY MEDICINE CLINIC | Age: 73
End: 2018-06-21

## 2018-06-26 ENCOUNTER — OFFICE VISIT (OUTPATIENT)
Dept: FAMILY MEDICINE CLINIC | Age: 73
End: 2018-06-26

## 2018-06-26 VITALS
BODY MASS INDEX: 23.55 KG/M2 | HEART RATE: 88 BPM | RESPIRATION RATE: 14 BRPM | SYSTOLIC BLOOD PRESSURE: 124 MMHG | OXYGEN SATURATION: 99 % | HEIGHT: 62 IN | DIASTOLIC BLOOD PRESSURE: 80 MMHG | TEMPERATURE: 98.4 F | WEIGHT: 128 LBS

## 2018-06-26 DIAGNOSIS — R35.89 POLYURIA: ICD-10-CM

## 2018-06-26 DIAGNOSIS — R46.89 BEHAVIORAL CHANGE: ICD-10-CM

## 2018-06-26 DIAGNOSIS — F17.200 TOBACCO USE DISORDER: ICD-10-CM

## 2018-06-26 DIAGNOSIS — F31.9 BIPOLAR 1 DISORDER (HCC): Primary | ICD-10-CM

## 2018-06-26 DIAGNOSIS — R29.6 FREQUENT FALLS: ICD-10-CM

## 2018-06-26 LAB
BILIRUBIN, POC: NORMAL
BLOOD URINE, POC: NORMAL
CLARITY, POC: CLEAR
COLOR, POC: YELLOW
GLUCOSE URINE, POC: NORMAL
KETONES, POC: NORMAL
LEUKOCYTE EST, POC: NORMAL
NITRITE, POC: NORMAL
PH, POC: 7
PROTEIN, POC: NORMAL
SPECIFIC GRAVITY, POC: 1.01
UROBILINOGEN, POC: 0.2

## 2018-06-26 PROCEDURE — G8427 DOCREV CUR MEDS BY ELIG CLIN: HCPCS | Performed by: FAMILY MEDICINE

## 2018-06-26 PROCEDURE — 81002 URINALYSIS NONAUTO W/O SCOPE: CPT | Performed by: FAMILY MEDICINE

## 2018-06-26 PROCEDURE — 1090F PRES/ABSN URINE INCON ASSESS: CPT | Performed by: FAMILY MEDICINE

## 2018-06-26 PROCEDURE — 1036F TOBACCO NON-USER: CPT | Performed by: FAMILY MEDICINE

## 2018-06-26 PROCEDURE — 99214 OFFICE O/P EST MOD 30 MIN: CPT | Performed by: FAMILY MEDICINE

## 2018-06-26 PROCEDURE — G8399 PT W/DXA RESULTS DOCUMENT: HCPCS | Performed by: FAMILY MEDICINE

## 2018-06-26 PROCEDURE — 3288F FALL RISK ASSESSMENT DOCD: CPT | Performed by: FAMILY MEDICINE

## 2018-06-26 PROCEDURE — 4040F PNEUMOC VAC/ADMIN/RCVD: CPT | Performed by: FAMILY MEDICINE

## 2018-06-26 PROCEDURE — 1123F ACP DISCUSS/DSCN MKR DOCD: CPT | Performed by: FAMILY MEDICINE

## 2018-06-26 PROCEDURE — G8420 CALC BMI NORM PARAMETERS: HCPCS | Performed by: FAMILY MEDICINE

## 2018-06-26 PROCEDURE — 3017F COLORECTAL CA SCREEN DOC REV: CPT | Performed by: FAMILY MEDICINE

## 2018-06-26 RX ORDER — LANOLIN ALCOHOL/MO/W.PET/CERES
3 CREAM (GRAM) TOPICAL NIGHTLY
Status: ON HOLD | COMMUNITY
End: 2019-08-14 | Stop reason: SDUPTHER

## 2018-06-27 LAB — URINE CULTURE, ROUTINE: NORMAL

## 2018-07-11 PROBLEM — R13.10 SWALLOWING DISORDER: Status: ACTIVE | Noted: 2018-07-11

## 2018-09-04 ENCOUNTER — OFFICE VISIT (OUTPATIENT)
Dept: FAMILY MEDICINE CLINIC | Age: 73
End: 2018-09-04

## 2018-09-04 VITALS
RESPIRATION RATE: 14 BRPM | TEMPERATURE: 98.2 F | HEIGHT: 62 IN | BODY MASS INDEX: 25.03 KG/M2 | DIASTOLIC BLOOD PRESSURE: 74 MMHG | SYSTOLIC BLOOD PRESSURE: 122 MMHG | WEIGHT: 136 LBS | HEART RATE: 69 BPM

## 2018-09-04 DIAGNOSIS — Z85.3 HX OF BREAST CANCER: ICD-10-CM

## 2018-09-04 DIAGNOSIS — R29.6 FREQUENT FALLS: ICD-10-CM

## 2018-09-04 DIAGNOSIS — E78.00 PURE HYPERCHOLESTEROLEMIA: ICD-10-CM

## 2018-09-04 DIAGNOSIS — M81.0 OSTEOPOROSIS WITHOUT CURRENT PATHOLOGICAL FRACTURE, UNSPECIFIED OSTEOPOROSIS TYPE: Primary | ICD-10-CM

## 2018-09-04 DIAGNOSIS — E03.9 ACQUIRED HYPOTHYROIDISM: ICD-10-CM

## 2018-09-04 PROBLEM — R13.10 SWALLOWING DISORDER: Status: RESOLVED | Noted: 2018-07-11 | Resolved: 2018-09-04

## 2018-09-04 PROCEDURE — G8399 PT W/DXA RESULTS DOCUMENT: HCPCS | Performed by: FAMILY MEDICINE

## 2018-09-04 PROCEDURE — 1090F PRES/ABSN URINE INCON ASSESS: CPT | Performed by: FAMILY MEDICINE

## 2018-09-04 PROCEDURE — 99214 OFFICE O/P EST MOD 30 MIN: CPT | Performed by: FAMILY MEDICINE

## 2018-09-04 PROCEDURE — 4040F PNEUMOC VAC/ADMIN/RCVD: CPT | Performed by: FAMILY MEDICINE

## 2018-09-04 PROCEDURE — 1123F ACP DISCUSS/DSCN MKR DOCD: CPT | Performed by: FAMILY MEDICINE

## 2018-09-04 PROCEDURE — 1036F TOBACCO NON-USER: CPT | Performed by: FAMILY MEDICINE

## 2018-09-04 PROCEDURE — G8420 CALC BMI NORM PARAMETERS: HCPCS | Performed by: FAMILY MEDICINE

## 2018-09-04 PROCEDURE — G8427 DOCREV CUR MEDS BY ELIG CLIN: HCPCS | Performed by: FAMILY MEDICINE

## 2018-09-04 PROCEDURE — 1101F PT FALLS ASSESS-DOCD LE1/YR: CPT | Performed by: FAMILY MEDICINE

## 2018-09-04 PROCEDURE — 3017F COLORECTAL CA SCREEN DOC REV: CPT | Performed by: FAMILY MEDICINE

## 2018-09-04 RX ORDER — QUETIAPINE FUMARATE 200 MG/1
200 TABLET, FILM COATED ORAL NIGHTLY
COMMUNITY
End: 2019-08-02 | Stop reason: ALTCHOICE

## 2018-09-04 NOTE — PROGRESS NOTES
Subjective:      Patient ID: Chikis Frederick is a 71 y.o. female. Blood pressure 122/74, pulse 69, temperature 98.2 °F (36.8 °C), temperature source Oral, resp. rate 14, height 5' 2\" (1.575 m), weight 136 lb (61.7 kg), not currently breastfeeding. HPI Here for routine follow up of thyroid, lipids, tobacco dependence with her brother, Jaime Handley. No falls at her new NH, [facility]. Brother is very happy. Resident care (level 3). Staff oversees meds, but that's about it. Helps with bathing also. Meals provided. Unable to go to to store to buy alcohol or cigarettes. She has broken many bones over the past few years due to falls. She did successfully quit smoking with the patch. Can't smoke at her current residence, [facility]. Currently at 14 mg dose of patch off and on for past 6 months. When she has attempted to decrease the dose to 7 mg, she had significant cravings . Last tried this 3-4 months ago. But this costs $100/mo . And the cost of Ed Bailey is very high also. Will totally run out of savings in about a year. Hyperlipidemia: takes prav 20. No hx of CAD, TIA, CVA or PVD. Lab Results   Component Value Date    CHOL 180 03/05/2018    TRIG 93 03/05/2018    HDL 89 (H) 03/05/2018    LDLCALC 72 03/05/2018     Lab Results   Component Value Date    ALT 15 03/05/2018    AST 21 03/05/2018        Has normal renal function   Lab Results   Component Value Date     03/05/2018    K 4.4 03/05/2018    BUN 20 03/05/2018    CREATININE 0.7 03/05/2018          On synthroid for hypothyroid-  Stable at current dose. She has gained a little weight, but is eating better now. Lab Results   Component Value Date    TSH 0.38 03/05/2018      Osteoporosis: currently on calcium and D. On REclast- last dose was 3/18. Last DEXA was also 3/18.       Patient Active Problem List   Diagnosis    Hyperlipidemia    Osteoporosis    Thoracic vertebral fracture- T7 (12/2010)    B12 deficiency    KEE (stress urinary facility-administered medications for this visit. Immunization History   Administered Date(s) Administered    Influenza Whole 10/19/2009    Pneumococcal 13-valent Conjugate ([id number]) 09/08/2015    Pneumococcal Conjugate 7-valent 06/12/2009    Pneumococcal Polysaccharide ([id number]) 09/24/2013    Td 08/07/2017        Social History   Substance Use Topics    Smoking status: Former Smoker     Packs/day: 1.00     Years: 20.00     Types: Cigarettes     Quit date: 9/10/2017    Smokeless tobacco: Never Used    Alcohol use No      Comment: hx alcoholism. Review of Systems has no complaints today. No chest pains, dizziness, heart palpitations, dyspnea, lightheadedness, worsening edema. Objective:   Physical Exam   Constitutional: She is oriented to person, place, and time. She appears well-developed and well-nourished. Neck: Normal range of motion. Neck supple. Carotid bruit is not present. No thyromegaly present. Cardiovascular: Normal rate, regular rhythm, normal heart sounds and intact distal pulses. No murmur heard. Pulmonary/Chest: Effort normal and breath sounds normal. No respiratory distress. She has no wheezes. She has no rales. Musculoskeletal: Normal range of motion. She exhibits no edema. Neurological: She is alert and oriented to person, place, and time. Skin: Skin is warm and dry. Psychiatric: She has a normal mood and affect. Her behavior is normal. Judgment and thought content normal.   Nursing note and vitals reviewed. Assessment:      1. Osteoporosis without current pathological fracture, unspecified osteoporosis type  - no new fracture since has not fallen. Treated with vitD calcium and yearly REclast. Plan to get vit D level next lab draw. 2. Pure hypercholesterolemia  - Stable; continue statin therapy for primary prevention    3. Acquired hypothyroidism  - stable on current dose of synthroid. Retest TSH in 6 months.      4. Hx of breast cancer- 1999, left, lumpectomy/ALND (Dr Thirza Severance)  - no longer sees heme onc. Gets yearly mammo's. No lumps felt by her    5. Frequent falls  - better now that she has good supervision at [facility]. Has completed therapy there. OK to continue 14mg of Nicoderm indefinitely if lower doses cause craving and relapse. Plan:      F/u 6 mo fasting.         Baljinder Nettles MD

## 2019-02-22 DIAGNOSIS — M81.0 OSTEOPOROSIS WITHOUT CURRENT PATHOLOGICAL FRACTURE, UNSPECIFIED OSTEOPOROSIS TYPE: ICD-10-CM

## 2019-02-22 RX ORDER — ZOLEDRONIC ACID 5 MG/100ML
5 INJECTION, SOLUTION INTRAVENOUS ONCE
Qty: 100 ML | Refills: 0 | Status: SHIPPED | OUTPATIENT
Start: 2019-02-22 | End: 2019-08-02

## 2019-03-04 ENCOUNTER — OFFICE VISIT (OUTPATIENT)
Dept: FAMILY MEDICINE CLINIC | Age: 74
End: 2019-03-04
Payer: MEDICARE

## 2019-03-04 VITALS
TEMPERATURE: 98 F | WEIGHT: 140 LBS | SYSTOLIC BLOOD PRESSURE: 120 MMHG | BODY MASS INDEX: 25.76 KG/M2 | HEIGHT: 62 IN | DIASTOLIC BLOOD PRESSURE: 78 MMHG | RESPIRATION RATE: 16 BRPM | HEART RATE: 74 BPM

## 2019-03-04 DIAGNOSIS — F10.11 NONDEPENDENT ALCOHOL ABUSE, IN REMISSION: ICD-10-CM

## 2019-03-04 DIAGNOSIS — R29.6 FREQUENT FALLS: ICD-10-CM

## 2019-03-04 DIAGNOSIS — F31.9 BIPOLAR 1 DISORDER (HCC): ICD-10-CM

## 2019-03-04 DIAGNOSIS — E78.00 PURE HYPERCHOLESTEROLEMIA: ICD-10-CM

## 2019-03-04 DIAGNOSIS — E03.9 ACQUIRED HYPOTHYROIDISM: ICD-10-CM

## 2019-03-04 DIAGNOSIS — Z85.3 HX OF BREAST CANCER: ICD-10-CM

## 2019-03-04 DIAGNOSIS — M80.00XD OSTEOPOROSIS WITH CURRENT PATHOLOGICAL FRACTURE WITH ROUTINE HEALING, UNSPECIFIED OSTEOPOROSIS TYPE, SUBSEQUENT ENCOUNTER: ICD-10-CM

## 2019-03-04 DIAGNOSIS — E78.00 PURE HYPERCHOLESTEROLEMIA: Primary | ICD-10-CM

## 2019-03-04 LAB
A/G RATIO: 2.2 (ref 1.1–2.2)
ALBUMIN SERPL-MCNC: 4.4 G/DL (ref 3.4–5)
ALP BLD-CCNC: 86 U/L (ref 40–129)
ALT SERPL-CCNC: 9 U/L (ref 10–40)
ANION GAP SERPL CALCULATED.3IONS-SCNC: 16 MMOL/L (ref 3–16)
AST SERPL-CCNC: 20 U/L (ref 15–37)
BILIRUB SERPL-MCNC: 0.5 MG/DL (ref 0–1)
BUN BLDV-MCNC: 15 MG/DL (ref 7–20)
CALCIUM SERPL-MCNC: 9.8 MG/DL (ref 8.3–10.6)
CHLORIDE BLD-SCNC: 102 MMOL/L (ref 99–110)
CHOLESTEROL, TOTAL: 164 MG/DL (ref 0–199)
CO2: 25 MMOL/L (ref 21–32)
CREAT SERPL-MCNC: 0.8 MG/DL (ref 0.6–1.2)
GFR AFRICAN AMERICAN: >60
GFR NON-AFRICAN AMERICAN: >60
GLOBULIN: 2 G/DL
GLUCOSE BLD-MCNC: 90 MG/DL (ref 70–99)
HDLC SERPL-MCNC: 66 MG/DL (ref 40–60)
LDL CHOLESTEROL CALCULATED: 81 MG/DL
POTASSIUM SERPL-SCNC: 4.4 MMOL/L (ref 3.5–5.1)
SODIUM BLD-SCNC: 143 MMOL/L (ref 136–145)
TOTAL PROTEIN: 6.4 G/DL (ref 6.4–8.2)
TRIGL SERPL-MCNC: 86 MG/DL (ref 0–150)
TSH SERPL DL<=0.05 MIU/L-ACNC: 0.3 UIU/ML (ref 0.27–4.2)
VLDLC SERPL CALC-MCNC: 17 MG/DL

## 2019-03-04 PROCEDURE — 99214 OFFICE O/P EST MOD 30 MIN: CPT | Performed by: FAMILY MEDICINE

## 2019-03-04 PROCEDURE — 1123F ACP DISCUSS/DSCN MKR DOCD: CPT | Performed by: FAMILY MEDICINE

## 2019-03-04 PROCEDURE — 4040F PNEUMOC VAC/ADMIN/RCVD: CPT | Performed by: FAMILY MEDICINE

## 2019-03-04 PROCEDURE — 1090F PRES/ABSN URINE INCON ASSESS: CPT | Performed by: FAMILY MEDICINE

## 2019-03-04 PROCEDURE — G8427 DOCREV CUR MEDS BY ELIG CLIN: HCPCS | Performed by: FAMILY MEDICINE

## 2019-03-04 PROCEDURE — G8419 CALC BMI OUT NRM PARAM NOF/U: HCPCS | Performed by: FAMILY MEDICINE

## 2019-03-04 PROCEDURE — 1101F PT FALLS ASSESS-DOCD LE1/YR: CPT | Performed by: FAMILY MEDICINE

## 2019-03-04 PROCEDURE — 1036F TOBACCO NON-USER: CPT | Performed by: FAMILY MEDICINE

## 2019-03-04 PROCEDURE — 3017F COLORECTAL CA SCREEN DOC REV: CPT | Performed by: FAMILY MEDICINE

## 2019-03-04 PROCEDURE — G8399 PT W/DXA RESULTS DOCUMENT: HCPCS | Performed by: FAMILY MEDICINE

## 2019-03-04 PROCEDURE — G8482 FLU IMMUNIZE ORDER/ADMIN: HCPCS | Performed by: FAMILY MEDICINE

## 2019-03-04 ASSESSMENT — PATIENT HEALTH QUESTIONNAIRE - PHQ9
2. FEELING DOWN, DEPRESSED OR HOPELESS: 0
SUM OF ALL RESPONSES TO PHQ QUESTIONS 1-9: 0
SUM OF ALL RESPONSES TO PHQ QUESTIONS 1-9: 0
SUM OF ALL RESPONSES TO PHQ9 QUESTIONS 1 & 2: 0
1. LITTLE INTEREST OR PLEASURE IN DOING THINGS: 0

## 2019-03-06 DIAGNOSIS — M81.0 SENILE OSTEOPOROSIS: ICD-10-CM

## 2019-03-06 RX ORDER — METHYLPREDNISOLONE SODIUM SUCCINATE 125 MG/2ML
125 INJECTION, POWDER, LYOPHILIZED, FOR SOLUTION INTRAMUSCULAR; INTRAVENOUS ONCE
Status: CANCELLED | OUTPATIENT
Start: 2019-03-06 | End: 2019-03-06

## 2019-03-06 RX ORDER — SODIUM CHLORIDE 0.9 % (FLUSH) 0.9 %
10 SYRINGE (ML) INJECTION PRN
Status: CANCELLED | OUTPATIENT
Start: 2019-03-06

## 2019-03-06 RX ORDER — SODIUM CHLORIDE 9 MG/ML
INJECTION, SOLUTION INTRAVENOUS CONTINUOUS
Status: CANCELLED | OUTPATIENT
Start: 2019-03-06

## 2019-03-06 RX ORDER — DIPHENHYDRAMINE HYDROCHLORIDE 50 MG/ML
50 INJECTION INTRAMUSCULAR; INTRAVENOUS ONCE
Status: CANCELLED | OUTPATIENT
Start: 2019-03-06 | End: 2019-03-06

## 2019-03-06 RX ORDER — ZOLEDRONIC ACID 5 MG/100ML
5 INJECTION, SOLUTION INTRAVENOUS ONCE
Status: CANCELLED | OUTPATIENT
Start: 2019-03-06 | End: 2019-03-06

## 2019-03-06 RX ORDER — 0.9 % SODIUM CHLORIDE 0.9 %
10 VIAL (ML) INJECTION ONCE
Status: CANCELLED | OUTPATIENT
Start: 2019-03-06 | End: 2019-03-06

## 2019-03-06 RX ORDER — EPINEPHRINE 1 MG/ML
0.3 INJECTION, SOLUTION, CONCENTRATE INTRAVENOUS PRN
Status: CANCELLED | OUTPATIENT
Start: 2019-03-06

## 2019-03-27 ENCOUNTER — HOSPITAL ENCOUNTER (OUTPATIENT)
Dept: INFUSION THERAPY | Age: 74
Setting detail: INFUSION SERIES
Discharge: HOME | End: 2019-03-27
Payer: MEDICARE

## 2019-03-27 VITALS
RESPIRATION RATE: 16 BRPM | DIASTOLIC BLOOD PRESSURE: 84 MMHG | HEART RATE: 74 BPM | TEMPERATURE: 97.8 F | SYSTOLIC BLOOD PRESSURE: 133 MMHG

## 2019-03-27 DIAGNOSIS — M81.0 SENILE OSTEOPOROSIS: Primary | ICD-10-CM

## 2019-03-27 PROCEDURE — 96365 THER/PROPH/DIAG IV INF INIT: CPT

## 2019-03-27 PROCEDURE — 6360000002 HC RX W HCPCS: Performed by: FAMILY MEDICINE

## 2019-03-27 RX ORDER — METHYLPREDNISOLONE SODIUM SUCCINATE 125 MG/2ML
125 INJECTION, POWDER, LYOPHILIZED, FOR SOLUTION INTRAMUSCULAR; INTRAVENOUS ONCE
Status: CANCELLED | OUTPATIENT
Start: 2019-03-27 | End: 2019-03-27

## 2019-03-27 RX ORDER — DIPHENHYDRAMINE HYDROCHLORIDE 50 MG/ML
50 INJECTION INTRAMUSCULAR; INTRAVENOUS ONCE
Status: CANCELLED | OUTPATIENT
Start: 2019-03-27 | End: 2019-03-27

## 2019-03-27 RX ORDER — ZOLEDRONIC ACID 5 MG/100ML
5 INJECTION, SOLUTION INTRAVENOUS ONCE
Status: CANCELLED | OUTPATIENT
Start: 2019-03-27 | End: 2019-03-27

## 2019-03-27 RX ORDER — EPINEPHRINE 1 MG/ML
0.3 INJECTION, SOLUTION, CONCENTRATE INTRAVENOUS PRN
Status: CANCELLED | OUTPATIENT
Start: 2019-03-27

## 2019-03-27 RX ORDER — SODIUM CHLORIDE 0.9 % (FLUSH) 0.9 %
10 SYRINGE (ML) INJECTION PRN
Status: CANCELLED | OUTPATIENT
Start: 2019-03-27

## 2019-03-27 RX ORDER — ZOLEDRONIC ACID 5 MG/100ML
5 INJECTION, SOLUTION INTRAVENOUS ONCE
Status: COMPLETED | OUTPATIENT
Start: 2019-03-27 | End: 2019-03-27

## 2019-03-27 RX ORDER — 0.9 % SODIUM CHLORIDE 0.9 %
10 VIAL (ML) INJECTION ONCE
Status: CANCELLED | OUTPATIENT
Start: 2019-03-27 | End: 2019-03-27

## 2019-03-27 RX ORDER — SODIUM CHLORIDE 9 MG/ML
INJECTION, SOLUTION INTRAVENOUS CONTINUOUS
Status: CANCELLED | OUTPATIENT
Start: 2019-03-27

## 2019-03-27 RX ADMIN — ZOLEDRONIC ACID 5 MG: 5 INJECTION, SOLUTION INTRAVENOUS at 11:40

## 2019-03-27 NOTE — PROGRESS NOTES
Outpatient [address] [facility]    Reclast Infusion    NAME:  Natalie Busby  YOB: 1945  MEDICAL RECORD NUMBER:  [id number]  DATE:  3/27/2019    Patient arrived to [place] [address]   [] per wheelchair   [x] ambulatory with her walker from an F. Is this the patient's first Reclast Infusion? No     Denies any pain or discomfort. Ambulates well with walker and supervision. All questions answered about the medication as a re-fresher. Did not have any problems with reclast before. Did the patient experience any adverse reactions to Reclast? No    Any recent oral or dental surgery? No    Any recent active fever, infections and/or illnesses? No    Patient has history of pathological fracture? No    Patient has had a recent fracture due to trauma or injury? Yes in December 5th, 2017 due to a fall she had a closed nondispalced fracture of the 6 th cervical vertebra with unknown morphology. Patient has had a recent orthopedic surgery or procedure done? No    Approximate date of last Dexa scan? 3/19/2018    Patient has had at least 24 oz. of fluids today without caffeine? YES      /84   Pulse 74   Temp 97.8 °F (36.6 °C) (Oral)   Resp 16     Labs   BMP:   Lab Results   Component Value Date     03/04/2019    K 4.4 03/04/2019     03/04/2019    CO2 25 03/04/2019    BUN 15 03/04/2019    LABALBU 4.4 03/04/2019    CREATININE 0.8 03/04/2019    CALCIUM 9.8 03/04/2019    GFRAA >60 03/04/2019    GFRAA >60 09/12/2012    LABGLOM >60 03/04/2019    LABGLOM 63.0 06/01/2011    GLUCOSE 90 03/04/2019    GLUCOSE 85 06/01/2011       Administered Reclast via: [x] Peripheral access    [] PICC access    [] Port access    Reclast 5 mg given IVPB over 30 minutes. Her brother was here with her and both were reminded that this med is due 03/28/2020. Encouraged fluids to keep kidneys hydrated. Response to treatment:  Well tolerated by patient.       Electronically signed by Sunday Morillo RN on 3/27/2019 at 4:10 PM

## 2019-04-22 ENCOUNTER — HOSPITAL ENCOUNTER (OUTPATIENT)
Dept: WOMENS IMAGING | Age: 74
Discharge: HOME | End: 2019-04-22
Payer: MEDICARE

## 2019-04-22 DIAGNOSIS — Z12.31 VISIT FOR SCREENING MAMMOGRAM: ICD-10-CM

## 2019-04-22 PROCEDURE — 77067 SCR MAMMO BI INCL CAD: CPT

## 2019-06-07 ENCOUNTER — TELEPHONE (OUTPATIENT)
Dept: FAMILY MEDICINE CLINIC | Age: 74
End: 2019-06-07

## 2019-06-07 NOTE — TELEPHONE ENCOUNTER
Called Amy at [address] [place] [address], she said the patient was using the 7 mg patches for smoking. Wanted to know if she can continue these. Rebeccaas fax order over to her at [phone]  Per Dr. Israel Notice this is ok. Put order in for #30 with 1 refill. Is this okay.

## 2019-06-07 NOTE — TELEPHONE ENCOUNTER
Amy from [facility] is calling in regards to pts Nicotine patches  She states somehow the order was dc  They need to know if Dr Krista Ramsay is OK with her staying on her patches   Please advise    Once addressed, this documentation will need to be faxed to [facility] at [phone]

## 2019-07-02 ENCOUNTER — TELEPHONE (OUTPATIENT)
Dept: FAMILY MEDICINE CLINIC | Age: 74
End: 2019-07-02

## 2019-07-02 NOTE — TELEPHONE ENCOUNTER
Got message from Salma Ellison [address] [place] [address]. Kimberlyangeli Herter - Resident -  Rosa Casanova states she fell in her living room. No injuries seen by nursing staff, denies any pain.

## 2019-07-11 ENCOUNTER — TELEPHONE (OUTPATIENT)
Dept: FAMILY MEDICINE CLINIC | Age: 74
End: 2019-07-11

## 2019-07-11 DIAGNOSIS — F17.200 TOBACCO USE DISORDER: Primary | ICD-10-CM

## 2019-07-11 NOTE — TELEPHONE ENCOUNTER
Spoke with brother. He stated that he has already bought the nicotine gum and gave it to the nursing facility but will not give it to patient until an order has been sent over.

## 2019-08-02 ENCOUNTER — APPOINTMENT (OUTPATIENT)
Dept: CT IMAGING | Age: 74
End: 2019-08-02
Payer: MEDICARE

## 2019-08-02 ENCOUNTER — TELEPHONE (OUTPATIENT)
Dept: FAMILY MEDICINE CLINIC | Age: 74
End: 2019-08-02

## 2019-08-02 ENCOUNTER — HOSPITAL ENCOUNTER (EMERGENCY)
Age: 74
Discharge: HOME | End: 2019-08-02
Attending: EMERGENCY MEDICINE
Payer: MEDICARE

## 2019-08-02 VITALS
BODY MASS INDEX: 24.02 KG/M2 | SYSTOLIC BLOOD PRESSURE: 97 MMHG | HEART RATE: 65 BPM | TEMPERATURE: 98 F | WEIGHT: 127.21 LBS | DIASTOLIC BLOOD PRESSURE: 67 MMHG | RESPIRATION RATE: 16 BRPM | OXYGEN SATURATION: 100 % | HEIGHT: 61 IN

## 2019-08-02 DIAGNOSIS — R41.0 CONFUSION: ICD-10-CM

## 2019-08-02 DIAGNOSIS — R31.9 URINARY TRACT INFECTION WITH HEMATURIA, SITE UNSPECIFIED: Primary | ICD-10-CM

## 2019-08-02 DIAGNOSIS — N39.0 URINARY TRACT INFECTION WITH HEMATURIA, SITE UNSPECIFIED: Primary | ICD-10-CM

## 2019-08-02 LAB
A/G RATIO: 2 (ref 1.1–2.2)
ALBUMIN SERPL-MCNC: 4.3 G/DL (ref 3.4–5)
ALP BLD-CCNC: 61 U/L (ref 40–129)
ALT SERPL-CCNC: 7 U/L (ref 10–40)
AMMONIA: 28 UMOL/L (ref 11–51)
ANION GAP SERPL CALCULATED.3IONS-SCNC: 13 MMOL/L (ref 3–16)
AST SERPL-CCNC: 12 U/L (ref 15–37)
BACTERIA: ABNORMAL /HPF
BASE EXCESS VENOUS: 1.3 MMOL/L
BASOPHILS ABSOLUTE: 0 K/UL (ref 0–0.2)
BASOPHILS RELATIVE PERCENT: 0.1 %
BILIRUB SERPL-MCNC: 0.5 MG/DL (ref 0–1)
BILIRUBIN URINE: NEGATIVE
BLOOD, URINE: ABNORMAL
BUN BLDV-MCNC: 8 MG/DL (ref 7–20)
CALCIUM SERPL-MCNC: 10.8 MG/DL (ref 8.3–10.6)
CARBOXYHEMOGLOBIN: 1.6 %
CHLORIDE BLD-SCNC: 106 MMOL/L (ref 99–110)
CLARITY: ABNORMAL
CO2: 23 MMOL/L (ref 21–32)
COLOR: YELLOW
CREAT SERPL-MCNC: 0.7 MG/DL (ref 0.6–1.2)
EOSINOPHILS ABSOLUTE: 0 K/UL (ref 0–0.6)
EOSINOPHILS RELATIVE PERCENT: 0 %
EPITHELIAL CELLS, UA: ABNORMAL /HPF
GFR AFRICAN AMERICAN: >60
GFR NON-AFRICAN AMERICAN: >60
GLOBULIN: 2.1 G/DL
GLUCOSE BLD-MCNC: 105 MG/DL (ref 70–99)
GLUCOSE BLD-MCNC: 98 MG/DL
GLUCOSE BLD-MCNC: 98 MG/DL (ref 70–99)
GLUCOSE URINE: NEGATIVE MG/DL
HCO3 VENOUS: 24 MMOL/L (ref 23–29)
HCT VFR BLD CALC: 41.8 % (ref 36–48)
HEMOGLOBIN: 14.1 G/DL (ref 12–16)
KETONES, URINE: 15 MG/DL
LEUKOCYTE ESTERASE, URINE: ABNORMAL
LITHIUM DOSE AMOUNT: NORMAL
LITHIUM LEVEL: 1 MMOL/L (ref 0.6–1.2)
LYMPHOCYTES ABSOLUTE: 1.2 K/UL (ref 1–5.1)
LYMPHOCYTES RELATIVE PERCENT: 16 %
MCH RBC QN AUTO: 30.1 PG (ref 26–34)
MCHC RBC AUTO-ENTMCNC: 33.6 G/DL (ref 31–36)
MCV RBC AUTO: 89.5 FL (ref 80–100)
METHEMOGLOBIN VENOUS: 0.5 %
MICROSCOPIC EXAMINATION: YES
MONOCYTES ABSOLUTE: 0.8 K/UL (ref 0–1.3)
MONOCYTES RELATIVE PERCENT: 10.8 %
NEUTROPHILS ABSOLUTE: 5.7 K/UL (ref 1.7–7.7)
NEUTROPHILS RELATIVE PERCENT: 73.1 %
NITRITE, URINE: NEGATIVE
O2 CONTENT, VEN: 18 ML/DL
O2 SAT, VEN: 93 %
O2 THERAPY: ABNORMAL
PCO2, VEN: 32.6 MMHG (ref 40–50)
PDW BLD-RTO: 14.3 % (ref 12.4–15.4)
PERFORMED ON: NORMAL
PH UA: 7.5 (ref 5–8)
PH VENOUS: 7.48 (ref 7.35–7.45)
PLATELET # BLD: 192 K/UL (ref 135–450)
PMV BLD AUTO: 10.9 FL (ref 5–10.5)
PO2, VEN: 56 MMHG
POTASSIUM REFLEX MAGNESIUM: 3.9 MMOL/L (ref 3.5–5.1)
PROTEIN UA: 30 MG/DL
RBC # BLD: 4.67 M/UL (ref 4–5.2)
RBC UA: ABNORMAL /HPF (ref 0–2)
SODIUM BLD-SCNC: 142 MMOL/L (ref 136–145)
SPECIFIC GRAVITY UA: 1.01 (ref 1–1.03)
TCO2 CALC VENOUS: 25 MMOL/L
TOTAL PROTEIN: 6.4 G/DL (ref 6.4–8.2)
TROPONIN: <0.01 NG/ML
URINE REFLEX TO CULTURE: YES
URINE TYPE: ABNORMAL
UROBILINOGEN, URINE: 0.2 E.U./DL
WBC # BLD: 7.8 K/UL (ref 4–11)
WBC UA: >100 /HPF (ref 0–5)

## 2019-08-02 PROCEDURE — 80053 COMPREHEN METABOLIC PANEL: CPT

## 2019-08-02 PROCEDURE — 70450 CT HEAD/BRAIN W/O DYE: CPT

## 2019-08-02 PROCEDURE — 82140 ASSAY OF AMMONIA: CPT

## 2019-08-02 PROCEDURE — 85025 COMPLETE CBC W/AUTO DIFF WBC: CPT

## 2019-08-02 PROCEDURE — 99285 EMERGENCY DEPT VISIT HI MDM: CPT

## 2019-08-02 PROCEDURE — 80178 ASSAY OF LITHIUM: CPT

## 2019-08-02 PROCEDURE — 2500000003 HC RX 250 WO HCPCS: Performed by: EMERGENCY MEDICINE

## 2019-08-02 PROCEDURE — 82803 BLOOD GASES ANY COMBINATION: CPT

## 2019-08-02 PROCEDURE — 81001 URINALYSIS AUTO W/SCOPE: CPT

## 2019-08-02 PROCEDURE — 96367 TX/PROPH/DG ADDL SEQ IV INF: CPT

## 2019-08-02 PROCEDURE — 87086 URINE CULTURE/COLONY COUNT: CPT

## 2019-08-02 PROCEDURE — 96366 THER/PROPH/DIAG IV INF ADDON: CPT

## 2019-08-02 PROCEDURE — 6360000002 HC RX W HCPCS: Performed by: EMERGENCY MEDICINE

## 2019-08-02 PROCEDURE — 96365 THER/PROPH/DIAG IV INF INIT: CPT

## 2019-08-02 PROCEDURE — 36415 COLL VENOUS BLD VENIPUNCTURE: CPT

## 2019-08-02 PROCEDURE — 2580000003 HC RX 258: Performed by: EMERGENCY MEDICINE

## 2019-08-02 PROCEDURE — 84484 ASSAY OF TROPONIN QUANT: CPT

## 2019-08-02 RX ORDER — OYSTER SHELL CALCIUM WITH VITAMIN D 500; 200 MG/1; [IU]/1
1 TABLET, FILM COATED ORAL 2 TIMES DAILY
COMMUNITY

## 2019-08-02 RX ORDER — LITHIUM CARBONATE 300 MG/1
300 CAPSULE ORAL 2 TIMES DAILY
COMMUNITY
End: 2021-01-11

## 2019-08-02 RX ORDER — CEPHALEXIN 500 MG/1
500 CAPSULE ORAL 2 TIMES DAILY
Qty: 20 CAPSULE | Refills: 0 | Status: ON HOLD | OUTPATIENT
Start: 2019-08-02 | End: 2019-08-14 | Stop reason: HOSPADM

## 2019-08-02 RX ORDER — M-VIT,TX,IRON,MINS/CALC/FOLIC 27MG-0.4MG
1 TABLET ORAL DAILY
COMMUNITY

## 2019-08-02 RX ORDER — DIVALPROEX SODIUM 250 MG/1
500 TABLET, DELAYED RELEASE ORAL NIGHTLY
COMMUNITY
End: 2021-01-11

## 2019-08-02 RX ORDER — GUAIFENESIN AND DEXTROMETHORPHAN HYDROBROMIDE 100; 10 MG/5ML; MG/5ML
10 SOLUTION ORAL EVERY 4 HOURS PRN
COMMUNITY
End: 2019-10-29

## 2019-08-02 RX ORDER — ACETAMINOPHEN 325 MG/1
650 TABLET ORAL EVERY 6 HOURS PRN
COMMUNITY

## 2019-08-02 RX ORDER — LOPERAMIDE HYDROCHLORIDE 2 MG/1
2 CAPSULE ORAL 4 TIMES DAILY PRN
Status: ON HOLD | COMMUNITY
End: 2019-08-14 | Stop reason: HOSPADM

## 2019-08-02 RX ADMIN — FOLIC ACID: 5 INJECTION, SOLUTION INTRAMUSCULAR; INTRAVENOUS; SUBCUTANEOUS at 16:50

## 2019-08-02 RX ADMIN — CEFTRIAXONE 1 G: 1 INJECTION, POWDER, FOR SOLUTION INTRAMUSCULAR; INTRAVENOUS at 18:29

## 2019-08-02 NOTE — ED PROVIDER NOTES
[facility]  eMERGENCY dEPARTMENT eNCOUnter        Pt Name: Cat Ross  MRN: [id number]  [place] 1945  Date of evaluation: 8/2/2019  Provider: Genaro Mcdonald DO  PCP: Sofia Ewing MD      CHIEF COMPLAINT       Chief Complaint   Patient presents with    Altered Mental Status     Sent by Dr. Terese Junior, [address] [facility], nurse at [facility] states there is documentation to suggest patient has been confused since [place], but she was on vacation and did not see patient until today. HISTORY OFPRESENT ILLNESS   (Location/Symptom, Timing/Onset, Context/Setting, Quality, Duration, Modifying Factors,Severity)  Note limiting factors. Cat Ross is a 68 y.o. female patient presents to ED for evaluation for altered mental status. Patient does have a baseline history of psychiatric conditions for which she is being treated by psychiatrist and has had her medications switched around recently. Patient's nursing facility was concerned because they felt the patient was exhibiting more confusion and more memory loss than her baseline. There is a call call patient's PCP and brought her to the ED for evaluation. Denies fevers chills chest pain dyspnea nausea vomiting or other pain or complaints    Nursing Notes were all reviewed and agreed with or any disagreements were addressed  in the HPI. REVIEW OF SYSTEMS    (2-9 systems for level 4, 10 or more for level 5)     Review of Systems    Positives and Pertinent negatives as per HPI. Except as noted above in the ROS, all other systems were reviewed andnegative.        PASTMEDICAL HISTORY     Past Medical History:   Diagnosis Date    Alcoholic ([address]     Alcoholism ([address]     Allergic rhinitis     Arthritis     Bipolar 1 disorder ([address]     Bipolar affective disorder, manic ([address]     Cancer ([address] 1999     left breast; lumpectomy    Chronic back pain     Colonoscopy refused 9/12/2012    Hyperlipidemia     minibag (1 g Intravenous New Bag 8/2/19 1829)   sodium chloride 0.9 % 1,841 mL with folic acid 1 mg, adult multi-vitamin with vitamin k 10 mL, thiamine 100 mg ( Intravenous Stopped 8/2/19 1830)       Patient presents to ED for evaluation for altered mental status for the past month or so patient has been exhibiting more confusion and memory loss than her baseline however patient does have a history of encephalopathy from alcoholism and psych patient is currently under the care of a psychiatrist has been changing her medications around. Patient's family states that patient was sent by nursing home to [facility] for UTI\" in ED patient is nontoxic no acute distress moves all extremities no focal neuro deficits. Normal vital signs will get CBC BMP check for leukocytosis lactate of normality head CT for intracranial abnormality urine x-ray for evidence of infection will give patient dose of thiamine and she does have a history of Wernicke's encephalopathy will reassess. Patient work-up notable for UTI may be contributing to her symptoms. Patient okay to [place] PCP follow-up return precaution and your symptoms    The patient tolerated their visit well. Thepatient and / or the family were informed of the results of any tests, a time was given to answer questions. FINAL IMPRESSION      1. Urinary tract infection with hematuria, site unspecified    2. Confusion        DISPOSITION/PLAN   DISPOSITION Decision To Discharge 08/02/2019 06:32:48 PM      PATIENT REFERRED TO:  No follow-up provider specified. DISCHARGE MEDICATIONS:  New Prescriptions    CEPHALEXIN (KEFLEX) 500 MG CAPSULE    Take 1 capsule by mouth 2 times daily for 10 days       DISCONTINUED MEDICATIONS:  Discontinued Medications    CALCIUM CARBONATE (OSCAL) 500 MG TABS TABLET    Take 500 mg by mouth 2 times daily. LAMOTRIGINE (LAMICTAL) 100 MG TABLET    Take 100 mg by mouth daily.     NICOTINE (NICODERM CQ) 7 MG/24HR    Place patch on skin daily

## 2019-08-02 NOTE — ED NOTES
Pharmacy Medication Reconciliation Note     List of medications patient is currently taking is complete. Allergies:  Codeine    Source of information:   1. ECF med list  2. Nurse from [facility]    Notes regarding home medications:   1. Reports she was given her AM meds  2. Last time lithium level was checked was early in July. It was started on June 26th.      Denies taking any other OTC or herbal medications    Caprice Paul PharmD, BCPS  8/2/2019  6:03 PM

## 2019-08-02 NOTE — ED TRIAGE NOTES
Sent by Dr. Radha Oneill, [address] [facility], nurse at [facility] states there is documentation to suggest patient has been confused since [place], but she was on vacation and did not see patient until today. Pt has h/o bi-polar disorder and has had numerous medication changes and adjustments lately.

## 2019-08-02 NOTE — TELEPHONE ENCOUNTER
Twin brother of the pt is calling to ask if the pt really needs to go to the ED. He states that the pt has lost weight in 6-8 weeks and not 2 weeks. And that she has not been eating because she has been smoking. He states that taking her to the ED might be a little bit of overkill when she could have blood work done [facility] else or have a urine culture taken at our lab.   Please call the brother back at [phone]    Please advise    Thanks

## 2019-08-02 NOTE — ED NOTES
Spoke to Jasvir Gooden from [facility] and gave her an update regarding patient's condition.  Will call her back when we know more information      Alexandra Squires RN  08/02/19 8925

## 2019-08-02 NOTE — TELEPHONE ENCOUNTER
Called July at [address] About the verbal order. She said the patient is at the hospital now. Was taken to the ER for evaluation. She is getting worse, with weight loss, talking - getting words out, and health in general.  Said she thinks her brother is in denial about her condition.

## 2019-08-02 NOTE — TELEPHONE ENCOUNTER
Informed July to have her go to the ER now. Read the massage to her. She said okay she will have her to to ER.

## 2019-08-02 NOTE — TELEPHONE ENCOUNTER
Then give Hayley Anderson an order for ua and culture and have her follow up with Dr Lise Johnson to review possibel side effects from medicine changes. Thank her brother for the additional information.

## 2019-08-04 LAB — URINE CULTURE, ROUTINE: NORMAL

## 2019-08-07 ENCOUNTER — HOSPITAL ENCOUNTER (INPATIENT)
Age: 74
LOS: 7 days | Discharge: NURSING, REHAB OR LONG-TERM CARE | DRG: 885 | End: 2019-08-14
Attending: EMERGENCY MEDICINE | Admitting: INTERNAL MEDICINE
Payer: MEDICARE

## 2019-08-07 DIAGNOSIS — F31.9 BIPOLAR 1 DISORDER (HCC): Primary | ICD-10-CM

## 2019-08-07 PROBLEM — F33.9 MDD (MAJOR DEPRESSIVE DISORDER), RECURRENT EPISODE (HCC): Status: ACTIVE | Noted: 2019-08-07

## 2019-08-07 LAB
A/G RATIO: 1.8 (ref 1.1–2.2)
ALBUMIN SERPL-MCNC: 4.2 G/DL (ref 3.4–5)
ALP BLD-CCNC: 60 U/L (ref 40–129)
ALT SERPL-CCNC: 11 U/L (ref 10–40)
AMPHETAMINE SCREEN, URINE: NORMAL
ANION GAP SERPL CALCULATED.3IONS-SCNC: 14 MMOL/L (ref 3–16)
AST SERPL-CCNC: 16 U/L (ref 15–37)
BACTERIA: ABNORMAL /HPF
BARBITURATE SCREEN URINE: NORMAL
BASOPHILS ABSOLUTE: 0 K/UL (ref 0–0.2)
BASOPHILS RELATIVE PERCENT: 0 %
BENZODIAZEPINE SCREEN, URINE: NORMAL
BILIRUB SERPL-MCNC: 0.6 MG/DL (ref 0–1)
BILIRUBIN URINE: ABNORMAL
BLOOD, URINE: NEGATIVE
BUN BLDV-MCNC: 9 MG/DL (ref 7–20)
CALCIUM SERPL-MCNC: 10.3 MG/DL (ref 8.3–10.6)
CANNABINOID SCREEN URINE: NORMAL
CHLORIDE BLD-SCNC: 106 MMOL/L (ref 99–110)
CLARITY: ABNORMAL
CO2: 24 MMOL/L (ref 21–32)
COCAINE METABOLITE SCREEN URINE: NORMAL
COLOR: YELLOW
CREAT SERPL-MCNC: 0.6 MG/DL (ref 0.6–1.2)
EOSINOPHILS ABSOLUTE: 0 K/UL (ref 0–0.6)
EOSINOPHILS RELATIVE PERCENT: 0 %
EPITHELIAL CELLS, UA: ABNORMAL /HPF
ETHANOL: NORMAL MG/DL (ref 0–0.08)
GFR AFRICAN AMERICAN: >60
GFR NON-AFRICAN AMERICAN: >60
GLOBULIN: 2.3 G/DL
GLUCOSE BLD-MCNC: 98 MG/DL (ref 70–99)
GLUCOSE URINE: NEGATIVE MG/DL
HCT VFR BLD CALC: 41.9 % (ref 36–48)
HEMOGLOBIN: 13.9 G/DL (ref 12–16)
KETONES, URINE: 40 MG/DL
LEUKOCYTE ESTERASE, URINE: ABNORMAL
LITHIUM DOSE AMOUNT: NORMAL
LITHIUM LEVEL: 1.1 MMOL/L (ref 0.6–1.2)
LYMPHOCYTES ABSOLUTE: 1.2 K/UL (ref 1–5.1)
LYMPHOCYTES RELATIVE PERCENT: 17.9 %
Lab: NORMAL
MCH RBC QN AUTO: 30.2 PG (ref 26–34)
MCHC RBC AUTO-ENTMCNC: 33.1 G/DL (ref 31–36)
MCV RBC AUTO: 91.3 FL (ref 80–100)
METHADONE SCREEN, URINE: NORMAL
MICROSCOPIC EXAMINATION: YES
MONOCYTES ABSOLUTE: 0.9 K/UL (ref 0–1.3)
MONOCYTES RELATIVE PERCENT: 12.5 %
MUCUS: ABNORMAL /LPF
NEUTROPHILS ABSOLUTE: 4.7 K/UL (ref 1.7–7.7)
NEUTROPHILS RELATIVE PERCENT: 69.6 %
NITRITE, URINE: NEGATIVE
OPIATE SCREEN URINE: NORMAL
OXYCODONE URINE: NORMAL
PDW BLD-RTO: 15 % (ref 12.4–15.4)
PH UA: 7.5
PH UA: 7.5 (ref 5–8)
PHENCYCLIDINE SCREEN URINE: NORMAL
PLATELET # BLD: 174 K/UL (ref 135–450)
PMV BLD AUTO: 10.8 FL (ref 5–10.5)
POTASSIUM REFLEX MAGNESIUM: 3.7 MMOL/L (ref 3.5–5.1)
PROPOXYPHENE SCREEN: NORMAL
PROTEIN UA: NEGATIVE MG/DL
RBC # BLD: 4.59 M/UL (ref 4–5.2)
RBC UA: ABNORMAL /HPF (ref 0–2)
SODIUM BLD-SCNC: 144 MMOL/L (ref 136–145)
SPECIFIC GRAVITY UA: 1.01 (ref 1–1.03)
TOTAL PROTEIN: 6.5 G/DL (ref 6.4–8.2)
TROPONIN: <0.01 NG/ML
TSH SERPL DL<=0.05 MIU/L-ACNC: 0.05 UIU/ML (ref 0.27–4.2)
URINE REFLEX TO CULTURE: YES
URINE TYPE: ABNORMAL
UROBILINOGEN, URINE: 0.2 E.U./DL
VALPROIC ACID LEVEL: 73.1 UG/ML (ref 50–100)
WBC # BLD: 6.8 K/UL (ref 4–11)
WBC UA: ABNORMAL /HPF (ref 0–5)

## 2019-08-07 PROCEDURE — 80178 ASSAY OF LITHIUM: CPT

## 2019-08-07 PROCEDURE — 81001 URINALYSIS AUTO W/SCOPE: CPT

## 2019-08-07 PROCEDURE — 85025 COMPLETE CBC W/AUTO DIFF WBC: CPT

## 2019-08-07 PROCEDURE — 93005 ELECTROCARDIOGRAM TRACING: CPT | Performed by: EMERGENCY MEDICINE

## 2019-08-07 PROCEDURE — 99285 EMERGENCY DEPT VISIT HI MDM: CPT

## 2019-08-07 PROCEDURE — G0480 DRUG TEST DEF 1-7 CLASSES: HCPCS

## 2019-08-07 PROCEDURE — 6370000000 HC RX 637 (ALT 250 FOR IP): Performed by: EMERGENCY MEDICINE

## 2019-08-07 PROCEDURE — 87086 URINE CULTURE/COLONY COUNT: CPT

## 2019-08-07 PROCEDURE — 84484 ASSAY OF TROPONIN QUANT: CPT

## 2019-08-07 PROCEDURE — 80307 DRUG TEST PRSMV CHEM ANLYZR: CPT

## 2019-08-07 PROCEDURE — 80164 ASSAY DIPROPYLACETIC ACD TOT: CPT

## 2019-08-07 PROCEDURE — 6370000000 HC RX 637 (ALT 250 FOR IP): Performed by: PSYCHIATRY & NEUROLOGY

## 2019-08-07 PROCEDURE — 1240000000 HC EMOTIONAL WELLNESS R&B

## 2019-08-07 PROCEDURE — 80053 COMPREHEN METABOLIC PANEL: CPT

## 2019-08-07 PROCEDURE — 84443 ASSAY THYROID STIM HORMONE: CPT

## 2019-08-07 RX ORDER — GUAIFENESIN/DEXTROMETHORPHAN 100-10MG/5
10 SYRUP ORAL EVERY 4 HOURS PRN
Status: DISCONTINUED | OUTPATIENT
Start: 2019-08-07 | End: 2019-08-07 | Stop reason: SDUPTHER

## 2019-08-07 RX ORDER — GUAIFENESIN/DEXTROMETHORPHAN 100-10MG/5
10 SYRUP ORAL EVERY 4 HOURS PRN
Status: DISCONTINUED | OUTPATIENT
Start: 2019-08-07 | End: 2019-08-14 | Stop reason: HOSPADM

## 2019-08-07 RX ORDER — LANOLIN ALCOHOL/MO/W.PET/CERES
3 CREAM (GRAM) TOPICAL NIGHTLY
Status: DISCONTINUED | OUTPATIENT
Start: 2019-08-07 | End: 2019-08-08

## 2019-08-07 RX ORDER — NICOTINE 21 MG/24HR
1 PATCH, TRANSDERMAL 24 HOURS TRANSDERMAL DAILY
Status: DISCONTINUED | OUTPATIENT
Start: 2019-08-07 | End: 2019-08-14 | Stop reason: HOSPADM

## 2019-08-07 RX ORDER — LITHIUM CARBONATE 300 MG/1
300 CAPSULE ORAL 2 TIMES DAILY
Status: DISCONTINUED | OUTPATIENT
Start: 2019-08-07 | End: 2019-08-14 | Stop reason: HOSPADM

## 2019-08-07 RX ORDER — SODIUM CHLORIDE 1000 MG
1 TABLET, SOLUBLE MISCELLANEOUS 2 TIMES DAILY
Status: DISCONTINUED | OUTPATIENT
Start: 2019-08-07 | End: 2019-08-14 | Stop reason: HOSPADM

## 2019-08-07 RX ORDER — MAGNESIUM HYDROXIDE/ALUMINUM HYDROXICE/SIMETHICONE 120; 1200; 1200 MG/30ML; MG/30ML; MG/30ML
30 SUSPENSION ORAL EVERY 6 HOURS PRN
Status: DISCONTINUED | OUTPATIENT
Start: 2019-08-07 | End: 2019-08-14 | Stop reason: HOSPADM

## 2019-08-07 RX ORDER — DIVALPROEX SODIUM 500 MG/1
500 TABLET, DELAYED RELEASE ORAL NIGHTLY
Status: DISCONTINUED | OUTPATIENT
Start: 2019-08-07 | End: 2019-08-14 | Stop reason: HOSPADM

## 2019-08-07 RX ORDER — M-VIT,TX,IRON,MINS/CALC/FOLIC 27MG-0.4MG
1 TABLET ORAL DAILY
Status: DISCONTINUED | OUTPATIENT
Start: 2019-08-08 | End: 2019-08-14 | Stop reason: HOSPADM

## 2019-08-07 RX ORDER — PRAVASTATIN SODIUM 20 MG
20 TABLET ORAL NIGHTLY
Status: DISCONTINUED | OUTPATIENT
Start: 2019-08-08 | End: 2019-08-14 | Stop reason: HOSPADM

## 2019-08-07 RX ORDER — CEPHALEXIN 500 MG/1
500 CAPSULE ORAL 2 TIMES DAILY
Status: DISCONTINUED | OUTPATIENT
Start: 2019-08-07 | End: 2019-08-08

## 2019-08-07 RX ORDER — LOPERAMIDE HYDROCHLORIDE 2 MG/1
2 CAPSULE ORAL 4 TIMES DAILY PRN
Status: DISCONTINUED | OUTPATIENT
Start: 2019-08-07 | End: 2019-08-09

## 2019-08-07 RX ORDER — TRAZODONE HYDROCHLORIDE 50 MG/1
50 TABLET ORAL NIGHTLY PRN
Status: DISCONTINUED | OUTPATIENT
Start: 2019-08-07 | End: 2019-08-14 | Stop reason: HOSPADM

## 2019-08-07 RX ORDER — IBUPROFEN 400 MG/1
400 TABLET ORAL EVERY 6 HOURS PRN
Status: DISCONTINUED | OUTPATIENT
Start: 2019-08-07 | End: 2019-08-14 | Stop reason: HOSPADM

## 2019-08-07 RX ORDER — LEVOTHYROXINE SODIUM 88 UG/1
88 TABLET ORAL DAILY
Status: DISCONTINUED | OUTPATIENT
Start: 2019-08-08 | End: 2019-08-14 | Stop reason: HOSPADM

## 2019-08-07 RX ORDER — ACETAMINOPHEN 325 MG/1
650 TABLET ORAL EVERY 6 HOURS PRN
Status: DISCONTINUED | OUTPATIENT
Start: 2019-08-07 | End: 2019-08-07 | Stop reason: SDUPTHER

## 2019-08-07 RX ORDER — LORAZEPAM 1 MG/1
1 TABLET ORAL EVERY 4 HOURS PRN
Status: DISCONTINUED | OUTPATIENT
Start: 2019-08-07 | End: 2019-08-07

## 2019-08-07 RX ORDER — ACETAMINOPHEN 325 MG/1
650 TABLET ORAL EVERY 4 HOURS PRN
Status: DISCONTINUED | OUTPATIENT
Start: 2019-08-07 | End: 2019-08-14 | Stop reason: HOSPADM

## 2019-08-07 RX ADMIN — SODIUM CHLORIDE TAB 1 GM 1 G: 1 TAB at 21:12

## 2019-08-07 RX ADMIN — TRAZODONE HYDROCHLORIDE 50 MG: 50 TABLET ORAL at 20:30

## 2019-08-07 RX ADMIN — LORAZEPAM 1 MG: 1 TABLET ORAL at 16:01

## 2019-08-07 RX ADMIN — OYSTER SHELL CALCIUM WITH VITAMIN D 1 TABLET: 500; 200 TABLET, FILM COATED ORAL at 20:32

## 2019-08-07 RX ADMIN — DIVALPROEX SODIUM 500 MG: 500 TABLET, DELAYED RELEASE ORAL at 20:30

## 2019-08-07 RX ADMIN — LITHIUM CARBONATE 300 MG: 300 CAPSULE ORAL at 20:30

## 2019-08-07 RX ADMIN — CEPHALEXIN 500 MG: 500 CAPSULE ORAL at 20:29

## 2019-08-07 RX ADMIN — MELATONIN 3 MG ORAL TABLET 3 MG: 3 TABLET ORAL at 20:30

## 2019-08-07 ASSESSMENT — SLEEP AND FATIGUE QUESTIONNAIRES
DIFFICULTY FALLING ASLEEP: YES
DO YOU HAVE DIFFICULTY SLEEPING: YES
DO YOU USE A SLEEP AID: YES

## 2019-08-07 ASSESSMENT — LIFESTYLE VARIABLES: HISTORY_ALCOHOL_USE: YES

## 2019-08-07 ASSESSMENT — PAIN SCALES - GENERAL: PAINLEVEL_OUTOF10: 0
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
## 2019-08-07 NOTE — ED NOTES
Assisted pt to and from the bathroom. Pt walked with her walker.       Magen Arrieta RN  08/07/19 7412

## 2019-08-07 NOTE — ED NOTES
CITLALLI Arroyo [facility],  at the bedside speaking with pt.       Colletta Dukes, JOHN  08/07/19 8484

## 2019-08-08 LAB
BASOPHILS ABSOLUTE: 0 K/UL (ref 0–0.2)
BASOPHILS RELATIVE PERCENT: 0.1 %
EKG ATRIAL RATE: 69 BPM
EKG DIAGNOSIS: NORMAL
EKG P AXIS: -4 DEGREES
EKG P-R INTERVAL: 156 MS
EKG Q-T INTERVAL: 416 MS
EKG QRS DURATION: 68 MS
EKG QTC CALCULATION (BAZETT): 445 MS
EKG R AXIS: -8 DEGREES
EKG T AXIS: -3 DEGREES
EKG VENTRICULAR RATE: 69 BPM
EOSINOPHILS ABSOLUTE: 0 K/UL (ref 0–0.6)
EOSINOPHILS RELATIVE PERCENT: 0 %
HCT VFR BLD CALC: 39 % (ref 36–48)
HEMOGLOBIN: 13.1 G/DL (ref 12–16)
LYMPHOCYTES ABSOLUTE: 1.2 K/UL (ref 1–5.1)
LYMPHOCYTES RELATIVE PERCENT: 26.1 %
MCH RBC QN AUTO: 30.2 PG (ref 26–34)
MCHC RBC AUTO-ENTMCNC: 33.7 G/DL (ref 31–36)
MCV RBC AUTO: 89.8 FL (ref 80–100)
MONOCYTES ABSOLUTE: 0.5 K/UL (ref 0–1.3)
MONOCYTES RELATIVE PERCENT: 11.4 %
NEUTROPHILS ABSOLUTE: 2.9 K/UL (ref 1.7–7.7)
NEUTROPHILS RELATIVE PERCENT: 62.4 %
PDW BLD-RTO: 15.8 % (ref 12.4–15.4)
PLATELET # BLD: 152 K/UL (ref 135–450)
PMV BLD AUTO: 10.2 FL (ref 5–10.5)
RBC # BLD: 4.34 M/UL (ref 4–5.2)
URINE CULTURE, ROUTINE: NORMAL
WBC # BLD: 4.6 K/UL (ref 4–11)

## 2019-08-08 PROCEDURE — 93010 ELECTROCARDIOGRAM REPORT: CPT | Performed by: INTERNAL MEDICINE

## 2019-08-08 PROCEDURE — 85025 COMPLETE CBC W/AUTO DIFF WBC: CPT

## 2019-08-08 PROCEDURE — 6370000000 HC RX 637 (ALT 250 FOR IP): Performed by: PSYCHIATRY & NEUROLOGY

## 2019-08-08 PROCEDURE — 99223 1ST HOSP IP/OBS HIGH 75: CPT | Performed by: PSYCHIATRY & NEUROLOGY

## 2019-08-08 PROCEDURE — 99222 1ST HOSP IP/OBS MODERATE 55: CPT | Performed by: NURSE PRACTITIONER

## 2019-08-08 PROCEDURE — 36415 COLL VENOUS BLD VENIPUNCTURE: CPT

## 2019-08-08 PROCEDURE — 6370000000 HC RX 637 (ALT 250 FOR IP): Performed by: NURSE PRACTITIONER

## 2019-08-08 PROCEDURE — 1240000000 HC EMOTIONAL WELLNESS R&B

## 2019-08-08 RX ORDER — RISPERIDONE 1 MG/1
1 TABLET, FILM COATED ORAL NIGHTLY
Status: DISCONTINUED | OUTPATIENT
Start: 2019-08-08 | End: 2019-08-14 | Stop reason: HOSPADM

## 2019-08-08 RX ORDER — LANOLIN ALCOHOL/MO/W.PET/CERES
6 CREAM (GRAM) TOPICAL
Status: DISCONTINUED | OUTPATIENT
Start: 2019-08-08 | End: 2019-08-14 | Stop reason: HOSPADM

## 2019-08-08 RX ORDER — CEPHALEXIN 500 MG/1
500 CAPSULE ORAL 2 TIMES DAILY
Status: COMPLETED | OUTPATIENT
Start: 2019-08-08 | End: 2019-08-12

## 2019-08-08 RX ADMIN — OYSTER SHELL CALCIUM WITH VITAMIN D 1 TABLET: 500; 200 TABLET, FILM COATED ORAL at 20:30

## 2019-08-08 RX ADMIN — RISPERIDONE 1 MG: 1 TABLET ORAL at 20:30

## 2019-08-08 RX ADMIN — LITHIUM CARBONATE 300 MG: 300 CAPSULE ORAL at 08:23

## 2019-08-08 RX ADMIN — VITAMIN D, TAB 1000IU (100/BT) 1000 UNITS: 25 TAB at 08:23

## 2019-08-08 RX ADMIN — CEPHALEXIN 500 MG: 500 CAPSULE ORAL at 20:29

## 2019-08-08 RX ADMIN — CEPHALEXIN 500 MG: 500 CAPSULE ORAL at 08:23

## 2019-08-08 RX ADMIN — SODIUM CHLORIDE TAB 1 GM 1 G: 1 TAB at 20:30

## 2019-08-08 RX ADMIN — DIVALPROEX SODIUM 500 MG: 500 TABLET, DELAYED RELEASE ORAL at 20:30

## 2019-08-08 RX ADMIN — MELATONIN 3 MG ORAL TABLET 6 MG: 3 TABLET ORAL at 20:30

## 2019-08-08 RX ADMIN — LEVOTHYROXINE SODIUM 88 MCG: 88 TABLET ORAL at 06:53

## 2019-08-08 RX ADMIN — OYSTER SHELL CALCIUM WITH VITAMIN D 1 TABLET: 500; 200 TABLET, FILM COATED ORAL at 08:24

## 2019-08-08 RX ADMIN — LITHIUM CARBONATE 300 MG: 300 CAPSULE ORAL at 20:30

## 2019-08-08 RX ADMIN — PRAVASTATIN SODIUM 20 MG: 20 TABLET ORAL at 20:30

## 2019-08-08 RX ADMIN — MULTIPLE VITAMINS W/ MINERALS TAB 1 TABLET: TAB at 08:23

## 2019-08-08 RX ADMIN — SODIUM CHLORIDE TAB 1 GM 1 G: 1 TAB at 08:23

## 2019-08-08 RX ADMIN — TRAZODONE HYDROCHLORIDE 50 MG: 50 TABLET ORAL at 22:48

## 2019-08-08 ASSESSMENT — SLEEP AND FATIGUE QUESTIONNAIRES
DO YOU USE A SLEEP AID: COMMENT
SLEEP PATTERN: INSOMNIA
DIFFICULTY FALLING ASLEEP: YES
RESTFUL SLEEP: NO
DO YOU HAVE DIFFICULTY SLEEPING: YES
DIFFICULTY STAYING ASLEEP: YES

## 2019-08-08 ASSESSMENT — LIFESTYLE VARIABLES: HISTORY_ALCOHOL_USE: YES

## 2019-08-08 NOTE — PLAN OF CARE
Problem: Risk for Impaired Skin Integrity  Goal: Tissue integrity - skin and mucous membranes  Structural intactness and normal physiological function of skin and  mucous membranes. Outcome: Ongoing  Turn Q2 hours, pillow support, heels elevated, sacral mepilex on.

## 2019-08-08 NOTE — PLAN OF CARE
[facility]  Initial Interdisciplinary Treatment Plan NOTE    Review Date & Time: 8/8/19 1024    Patient was not in treatment team    Admission Type:   Admission Type: Voluntary    Reason for admission:  Reason for Admission: Depression, Confusion      Estimated Length of Stay Update:  3-5 days  Estimated Discharge Date Update: 8/11    PATIENT STRENGTHS:  Patient Strengths Strengths: Positive Support  Patient Strengths and Limitations:Limitations: (MARIA EUGENIA)  Addictive Behavior:Addictive Behavior  In the past 3 months, have you felt or has someone told you that you have a problem with:  : (MARIA EUGENIA)  Do you have a history of Chemical Use?: (MARIA EUGENIA)  Do you have a history of Alcohol Use?: Yes(per report)  Do you have a history of Street Drug Abuse?: (MARIA EUGENIA)  Histroy of Prescripton Drug Abuse?: (MARIA EUGENIA)  Medical Problems:  Past Medical History:   Diagnosis Date    Alcoholic ([address]     Alcoholism ([address]     Allergic rhinitis     Arthritis     Bipolar 1 disorder ([address]     Bipolar affective disorder, manic ([address]     Cancer ([address] 1999     left breast; lumpectomy    Chronic back pain     Colonoscopy refused 9/12/2012    Hyperlipidemia     Hypotension     Hypothyroidism     Osteoporosis 5/20/2011    Peripheral edema     Prolonged QT interval     Subdural hematoma ([address] 2009    Vitamin D deficiency        EDUCATION:   Learner Progress Toward Treatment Goals: Reviewed results and recommendations of this team    Method: Individual    Outcome: Verbalized understanding    PATIENT GOALS: N/A    PLAN/TREATMENT RECOMMENDATIONS UPDATE: Evaluate and treat    GOALS UPDATE:   Time frame for Short-Term Goals: 2 days    Elsy Galindo RN

## 2019-08-08 NOTE — H&P
Psychiatry Initial Evaluation    Admission Date:    8/7/2019    Chief complaint / Reason for Admission:  Behavior change    HPI:   Patient is a 68 y.o. female with history of bipolar I disorder and possible alcohol related dementia transferred from [facility] for evaluation of change in behavior. Per NH, pt does not have a formal diagnosis of alcohol related dementia but has lived there for at least 2 years. Sees Dr. Diego Leal as an outpatient for management of her bipolar disorder. Over the last 6 months pt has been experiencing brittle illness, with mood fluctuations. Primarily demonstrating hypomanic symptoms including irritability, lability, poor sleep and mood elevation. Dr. Caryle Marines has been trying various medication adjustments to manage these symptoms without lasting benefit up until recently. I was able to speak to him to get additional back ground. Pt was stable for some time on a combination of fluoxetine 20 mg daily, lamotrigine 200 mg, and trazodone 100 mg before manifesting the above symptoms. She was then treated for much of last year with ziprasidone, later valproic acid, and most recently lithium. She is now off ziprasidone and on valproic acid and lithium. Two months ago she was started on quetiapine for her sleep which worked initially, then caused over-sedation so it was discontinued middle of last month. Per Dr. Ben Collier she was then continued on depakote and lithium alone and actually \"looked the best I've ever seen her in months\" when he met with her 10 days ago. However, around 7 days ago she started to demonstrate more \"confusion\" at nursing home. She was found to be disoriented, increasingly restless and anxious, having difficulty answering questions and difficulty sleeping. Also noted to be incontinent of urine and at times stool. She does have a UTI.        Duration: Subacute  Severity: Moderate-severe  Context: as above  Associated symptoms: as above    Past Psychiatric oxycontin and synthetic  opioids, may not be detected by this Methodology. Pain management screen  panel  Drug panel-PM-Hi Res Ur, Interp (PAIN) should be considered for drug  monitoring \".  PCP Screen, Urine 08/07/2019 Neg  Negative <25 ng/mL Final    Methadone Screen, Urine 08/07/2019 Neg  Negative <300 ng/mL Final    Propoxyphene Scrn, Ur 08/07/2019 Neg  Negative <300 ng/mL Final    pH, UA 08/07/2019 7.5   Final    Comment: Urine pH less than 5.0 or greater than 8.0 may indicate sample adulteration. Another sample should be collected if clinically  indicated.  Drug Screen Comment: 08/07/2019 see below   Final    Comment: This method is a screening test to detect only these drug  classes as part of a medical workup. Confirmatory testing  by another method should be ordered if clinically indicated.       Oxycodone Urine 08/07/2019 Neg  Negative <100 ng/ml Final    Mucus, UA 08/07/2019 2+* /LPF Final    WBC, UA 08/07/2019 20-50* 0 - 5 /HPF Final    RBC, UA 08/07/2019 0-2  0 - 2 /HPF Final    Epi Cells 08/07/2019 20-50  /HPF Final    Bacteria, UA 08/07/2019 1+* /HPF Final    WBC 08/08/2019 4.6  4.0 - 11.0 K/uL Final    RBC 08/08/2019 4.34  4.00 - 5.20 M/uL Final    Hemoglobin 08/08/2019 13.1  12.0 - 16.0 g/dL Final    Hematocrit 08/08/2019 39.0  36.0 - 48.0 % Final    MCV 08/08/2019 89.8  80.0 - 100.0 fL Final    MCH 08/08/2019 30.2  26.0 - 34.0 pg Final    MCHC 08/08/2019 33.7  31.0 - 36.0 g/dL Final    RDW 08/08/2019 15.8* 12.4 - 15.4 % Final    Platelets 01/35/3696 152  135 - 450 K/uL Final    MPV 08/08/2019 10.2  5.0 - 10.5 fL Final    Neutrophils % 08/08/2019 62.4  % Final    Lymphocytes % 08/08/2019 26.1  % Final    Monocytes % 08/08/2019 11.4  % Final    Eosinophils % 08/08/2019 0.0  % Final    Basophils % 08/08/2019 0.1  % Final    Neutrophils # 08/08/2019 2.9  1.7 - 7.7 K/uL Final    Lymphocytes # 08/08/2019 1.2  1.0 - 5.1 K/uL Final    Monocytes # 08/08/2019 0.5  0.0 - no doubt has made pt more sensitive to delirium. #Tobacco use disorder:  -NRT    Function:  -Consult physical therapy  -Consult occupational therapy  -Falls precautions    Medical Problems:  Internal medicine has been consulted. Appreciate recs. 1. UTI:  -Started on keflex 500 mg BID for 4 days by ED. 2.  Hypothyroidism:  -Continue levothyroxine 88 mcg daily    3. HLD:  -Continue pravastatin 20 mg nightly    4. Osteoporosis:  -Continue calcium/vit D supplementation     Code Status: Full    Disposition:    -Housing: [facility]  -Current outpatient follow-up: Dr. Shaun Vela    Estimated length of stay: 3-5 days    Prognosis:  Fair     Criteria for Discharge:  Not psychotic, not homicidal, not suicidal, behavioral disturbance under control, sleeping well, mood improved/stable, eating well, aftercare arranged. Spent > 70 minutes face to face with patient of which >50% was spent counseling and providing education regarding diagnosis, treatment options, and prognosis.     Lizzette Gonzalez MD  Staff Psychiatrist

## 2019-08-09 PROBLEM — E43 SEVERE PROTEIN-CALORIE MALNUTRITION (HCC): Status: ACTIVE | Noted: 2019-08-09

## 2019-08-09 PROCEDURE — 6370000000 HC RX 637 (ALT 250 FOR IP): Performed by: PSYCHIATRY & NEUROLOGY

## 2019-08-09 PROCEDURE — 6370000000 HC RX 637 (ALT 250 FOR IP): Performed by: NURSE PRACTITIONER

## 2019-08-09 PROCEDURE — 1240000000 HC EMOTIONAL WELLNESS R&B

## 2019-08-09 PROCEDURE — 99232 SBSQ HOSP IP/OBS MODERATE 35: CPT | Performed by: PSYCHIATRY & NEUROLOGY

## 2019-08-09 RX ADMIN — LITHIUM CARBONATE 300 MG: 300 CAPSULE ORAL at 20:35

## 2019-08-09 RX ADMIN — LITHIUM CARBONATE 300 MG: 300 CAPSULE ORAL at 08:53

## 2019-08-09 RX ADMIN — OYSTER SHELL CALCIUM WITH VITAMIN D 1 TABLET: 500; 200 TABLET, FILM COATED ORAL at 08:53

## 2019-08-09 RX ADMIN — RISPERIDONE 1 MG: 1 TABLET ORAL at 20:35

## 2019-08-09 RX ADMIN — CEPHALEXIN 500 MG: 500 CAPSULE ORAL at 08:53

## 2019-08-09 RX ADMIN — PRAVASTATIN SODIUM 20 MG: 20 TABLET ORAL at 20:35

## 2019-08-09 RX ADMIN — MULTIPLE VITAMINS W/ MINERALS TAB 1 TABLET: TAB at 08:53

## 2019-08-09 RX ADMIN — SODIUM CHLORIDE TAB 1 GM 1 G: 1 TAB at 08:53

## 2019-08-09 RX ADMIN — CEPHALEXIN 500 MG: 500 CAPSULE ORAL at 20:35

## 2019-08-09 RX ADMIN — OYSTER SHELL CALCIUM WITH VITAMIN D 1 TABLET: 500; 200 TABLET, FILM COATED ORAL at 20:35

## 2019-08-09 RX ADMIN — SODIUM CHLORIDE TAB 1 GM 1 G: 1 TAB at 20:35

## 2019-08-09 RX ADMIN — VITAMIN D, TAB 1000IU (100/BT) 1000 UNITS: 25 TAB at 08:53

## 2019-08-09 RX ADMIN — LEVOTHYROXINE SODIUM 88 MCG: 88 TABLET ORAL at 08:53

## 2019-08-09 RX ADMIN — DIVALPROEX SODIUM 500 MG: 500 TABLET, DELAYED RELEASE ORAL at 20:35

## 2019-08-09 NOTE — GROUP NOTE
Group Therapy Note    Date: August 9    Group Start Time: 1300  Group End Time: 1340  Group Topic: Relaxation    [address], [place]        Group Therapy Note    Attendees: 1         Patient's Goal:  To relax in group while watching a movie to help lesson anxiety. Notes:  Pt initially calm and relaxed in group, but then started focusing on her brother. Pt confused and initially tried to take off her clothes. Pt tearful at times and needed reassurance.     Status After Intervention:  Unchanged    Participation Level: Minimal    Participation Quality: Lethargic      Speech:  hesitant      Thought Process/Content: Perseverating      Affective Functioning: Flat      Mood: anxious      Level of consciousness:  Drowsy      Response to Learning: unable to evaluate      Endings: None Reported    Modes of Intervention: Support and Activity      Discipline Responsible: /Counselor      Signature:  ROHIT Parada

## 2019-08-10 PROCEDURE — 1240000000 HC EMOTIONAL WELLNESS R&B

## 2019-08-10 PROCEDURE — 6370000000 HC RX 637 (ALT 250 FOR IP): Performed by: NURSE PRACTITIONER

## 2019-08-10 PROCEDURE — 99233 SBSQ HOSP IP/OBS HIGH 50: CPT | Performed by: PSYCHIATRY & NEUROLOGY

## 2019-08-10 PROCEDURE — 6370000000 HC RX 637 (ALT 250 FOR IP): Performed by: PSYCHIATRY & NEUROLOGY

## 2019-08-10 RX ADMIN — OYSTER SHELL CALCIUM WITH VITAMIN D 1 TABLET: 500; 200 TABLET, FILM COATED ORAL at 20:40

## 2019-08-10 RX ADMIN — VITAMIN D, TAB 1000IU (100/BT) 1000 UNITS: 25 TAB at 08:09

## 2019-08-10 RX ADMIN — SODIUM CHLORIDE TAB 1 GM 1 G: 1 TAB at 08:20

## 2019-08-10 RX ADMIN — MULTIPLE VITAMINS W/ MINERALS TAB 1 TABLET: TAB at 08:09

## 2019-08-10 RX ADMIN — PRAVASTATIN SODIUM 20 MG: 20 TABLET ORAL at 20:40

## 2019-08-10 RX ADMIN — MELATONIN 3 MG ORAL TABLET 6 MG: 3 TABLET ORAL at 20:40

## 2019-08-10 RX ADMIN — CEPHALEXIN 500 MG: 500 CAPSULE ORAL at 20:40

## 2019-08-10 RX ADMIN — DIVALPROEX SODIUM 500 MG: 500 TABLET, DELAYED RELEASE ORAL at 20:40

## 2019-08-10 RX ADMIN — CEPHALEXIN 500 MG: 500 CAPSULE ORAL at 08:09

## 2019-08-10 RX ADMIN — RISPERIDONE 1 MG: 1 TABLET ORAL at 21:07

## 2019-08-10 RX ADMIN — LEVOTHYROXINE SODIUM 88 MCG: 88 TABLET ORAL at 06:44

## 2019-08-10 RX ADMIN — LITHIUM CARBONATE 300 MG: 300 CAPSULE ORAL at 20:40

## 2019-08-10 RX ADMIN — OYSTER SHELL CALCIUM WITH VITAMIN D 1 TABLET: 500; 200 TABLET, FILM COATED ORAL at 08:09

## 2019-08-10 RX ADMIN — SODIUM CHLORIDE TAB 1 GM 1 G: 1 TAB at 20:40

## 2019-08-10 RX ADMIN — LITHIUM CARBONATE 300 MG: 300 CAPSULE ORAL at 08:09

## 2019-08-10 NOTE — GROUP NOTE
Group Therapy Note    Date: August 10    Group Start Time: 1410  Group End Time: 5732  Group Topic: Recreational    [facility]        Group Therapy Note    Attendees: 5    Patient's Goal: to engage in a recreational leisure outlet to decrease stress and anxiety and improve mood and quality of life. Notes: Claire Graf engaged in listening to music, positively socializing with peers, painting, and reminiscing to decrease stress and anxiety. Claire Graf appeared anxious about the future. Claire Graf perseverated on asking CTRS \"what's next? How am I going to get home? \" CTRS encouraged Claire Graf to focus on the activity and the here and the now. Claire Graf was able to briefly focus.      Status After Intervention:  Unchanged    Participation Level: Interactive    Participation Quality: Appropriate, Attentive and Sharing      Speech:  normal      Thought Process/Content: Flight of ideas  Perseverating      Affective Functioning: Flat      Mood: anxious, depressed and labile      Level of consciousness:  Alert and Preoccupied      Response to Learning: Able to change behavior and Progressing to goal      Endings: None Reported    Modes of Intervention: Education, Support, Socialization, Exploration, Clarifying, Problem-solving and Activity      Discipline Responsible: Psychoeducational Specialist      Signature:  Catracho Thompson

## 2019-08-10 NOTE — PLAN OF CARE
Pt. Is anxious and depressed, confused about situation, received medications, fair appetite, currently visiting with family, no s/s of disress

## 2019-08-10 NOTE — GROUP NOTE
Group Therapy Note    Date: August 10    Group Start Time: 7989  Group End Time: 6364  Group Topic: [address] [place] [address]        Group Therapy Note    Attendees: 2    Patient's Goal: to actively listen to unit guidelines and set a daily goal.     Notes: Arleth Bob actively listened to unit guidelines. Arleth Bob appeared to be labile throughout group. Arleth Bob expressed to CTRS \"I don't fit in here. It would be better if my daughter was here. \" Arleth Bob appeared to be tearful and required reassurance and support. [facility] \"to have a better day. \"    Status After Intervention:  Improved    Participation Level:  Active Listener and Interactive    Participation Quality: Appropriate, Attentive and Sharing      Speech:  normal      Thought Process/Content: Flight of ideas      Affective Functioning: Flat      Mood: anxious and depressed      Level of consciousness:  Alert, confused      Response to Learning: Able to change behavior and Progressing to goal      Endings: None Reported    Modes of Intervention: Education, Support, Socialization, Exploration, Clarifying, Problem-solving and Activity      Discipline Responsible: Psychoeducational Specialist      Signature:  Lzia Medina

## 2019-08-10 NOTE — PLAN OF CARE
Pt is sitting up in a recliner watching TV on writers arrival. She is confused and tearful at times. She is fretting over things that are not real. She thinks the table in the day room is not right. She is worried about what she is supposed to be doing. She tries to talk about her family, but her speech gets confused and makes no sense. TLC and reassurance given. She is laying in bed awake at this time resting.

## 2019-08-11 PROCEDURE — 6370000000 HC RX 637 (ALT 250 FOR IP): Performed by: PSYCHIATRY & NEUROLOGY

## 2019-08-11 PROCEDURE — 1240000000 HC EMOTIONAL WELLNESS R&B

## 2019-08-11 PROCEDURE — 6370000000 HC RX 637 (ALT 250 FOR IP): Performed by: NURSE PRACTITIONER

## 2019-08-11 RX ADMIN — LEVOTHYROXINE SODIUM 88 MCG: 88 TABLET ORAL at 08:19

## 2019-08-11 RX ADMIN — OYSTER SHELL CALCIUM WITH VITAMIN D 1 TABLET: 500; 200 TABLET, FILM COATED ORAL at 08:19

## 2019-08-11 RX ADMIN — RISPERIDONE 1 MG: 1 TABLET ORAL at 19:56

## 2019-08-11 RX ADMIN — MULTIPLE VITAMINS W/ MINERALS TAB 1 TABLET: TAB at 08:19

## 2019-08-11 RX ADMIN — DIVALPROEX SODIUM 500 MG: 500 TABLET, DELAYED RELEASE ORAL at 19:56

## 2019-08-11 RX ADMIN — LITHIUM CARBONATE 300 MG: 300 CAPSULE ORAL at 19:55

## 2019-08-11 RX ADMIN — CEPHALEXIN 500 MG: 500 CAPSULE ORAL at 19:55

## 2019-08-11 RX ADMIN — PRAVASTATIN SODIUM 20 MG: 20 TABLET ORAL at 19:55

## 2019-08-11 RX ADMIN — CEPHALEXIN 500 MG: 500 CAPSULE ORAL at 08:19

## 2019-08-11 RX ADMIN — SODIUM CHLORIDE TAB 1 GM 1 G: 1 TAB at 08:19

## 2019-08-11 RX ADMIN — OYSTER SHELL CALCIUM WITH VITAMIN D 1 TABLET: 500; 200 TABLET, FILM COATED ORAL at 19:55

## 2019-08-11 RX ADMIN — VITAMIN D, TAB 1000IU (100/BT) 1000 UNITS: 25 TAB at 08:19

## 2019-08-11 RX ADMIN — LITHIUM CARBONATE 300 MG: 300 CAPSULE ORAL at 08:19

## 2019-08-11 RX ADMIN — SODIUM CHLORIDE TAB 1 GM 1 G: 1 TAB at 19:56

## 2019-08-11 RX ADMIN — MELATONIN 3 MG ORAL TABLET 6 MG: 3 TABLET ORAL at 19:56

## 2019-08-11 NOTE — PROGRESS NOTES
17   Ht 5' 2\" (1.575 m)   Wt 123 lb (55.8 kg)   SpO2 96%   BMI 22.50 kg/m²       Motor / Gait: restless, nml tone, no involuntary movements, gait unsteady, aided by walker. Cn: II-XII intact     Mental Status Examination:    Appearance: WF, appears stated age, street clothes, good grooming and hygiene  Behavior/Attitude toward examiner:  cooperative,  fair eye contact  Speech:  spontaneous, normal rate, normal volume, anxious tone  Mood:  \"are you sending me home whole or half\"  Affect:  Mood congruent, anxious  Thought processes:  Tangential with loose association  Thought Content: Denies SI, denies HI, no delusions context Perceptions: Denies AVH, not actively RTIS  Attention: impaired  Abstraction: [place]  Cognition: Average KASIA, Alert and oriented to person only Insight: impaired insight   Judgment: impaired judgment      LAB: Reviewed labs from last 24 hours      Dx:   Primary Psychiatric (DSM V) Diagnosis: Bipolar I disorder  Secondary Psychiatric (DSM V) Diagnoses: Delirium, hyperactive, secondary to UTI (probable)  Chemical Dependency Diagnoses: Alcohol use disorder, severe, in partial remission, Tobacco use disorder, severe  Active Medical Diagnoses: UTI, HLD, Hypothyroidism, Osteoporosis      All conditions detailed above are being treated while patient is hospitalized.      Tx plan: Generally: prevent self injury/aggression, stabilize mood/anxiety/psychotic/behavioral disturbance, establish/maintain aftercare, increase coping mechanisms, improve medication compliance.  All conditions present on admission are being treated while pt is hospitalized.      Legal Status: Voluntary     Primary Psychiatric Issues:  1.   Bipolar disorder with superimposed delirium:  Cont meds as prescribed  Chemical Dependency Issues:  #Alcohol use disorder:    #Tobacco use disorder:  -NRT     Function:  -Consult physical therapy  -Consult occupational therapy  -Falls precautions     Medical Problems:  Internal medicine has

## 2019-08-11 NOTE — PLAN OF CARE
Problem: Falls - Risk of:  Goal: Will remain free from falls  Description  Will remain free from falls  Outcome: Ongoing  Note:   Patient is a fall risk. Patient has shoes on when ambulating. Patient walks with a walker. Bed and chair alarm on when patient is in bed or sleeping. Problem: Coping:  Goal: Ability to remain calm will improve  Description  Ability to remain calm will improve  Note:   Patient is tearful, and hard to redirect when she gets upset.

## 2019-08-11 NOTE — PLAN OF CARE
Pt laying in bed awake at the start of the shift. She remains confused and thinks she is at the optometrist. She reorients easily to being in the hospital. She has a difficult time finishing a sentence, trailing off as if she forgets what she was saying. She continues ATB therapy for a UTI. She denies any pain/burning on urination. She denies SI/HI/AVH.

## 2019-08-12 PROCEDURE — 6370000000 HC RX 637 (ALT 250 FOR IP): Performed by: PSYCHIATRY & NEUROLOGY

## 2019-08-12 PROCEDURE — 97530 THERAPEUTIC ACTIVITIES: CPT

## 2019-08-12 PROCEDURE — 97116 GAIT TRAINING THERAPY: CPT

## 2019-08-12 PROCEDURE — 99233 SBSQ HOSP IP/OBS HIGH 50: CPT | Performed by: NURSE PRACTITIONER

## 2019-08-12 PROCEDURE — 97166 OT EVAL MOD COMPLEX 45 MIN: CPT

## 2019-08-12 PROCEDURE — 97535 SELF CARE MNGMENT TRAINING: CPT

## 2019-08-12 PROCEDURE — 97161 PT EVAL LOW COMPLEX 20 MIN: CPT

## 2019-08-12 PROCEDURE — 1240000000 HC EMOTIONAL WELLNESS R&B

## 2019-08-12 PROCEDURE — 6370000000 HC RX 637 (ALT 250 FOR IP): Performed by: NURSE PRACTITIONER

## 2019-08-12 RX ADMIN — OYSTER SHELL CALCIUM WITH VITAMIN D 1 TABLET: 500; 200 TABLET, FILM COATED ORAL at 20:37

## 2019-08-12 RX ADMIN — DIVALPROEX SODIUM 500 MG: 500 TABLET, DELAYED RELEASE ORAL at 20:38

## 2019-08-12 RX ADMIN — LEVOTHYROXINE SODIUM 88 MCG: 88 TABLET ORAL at 06:20

## 2019-08-12 RX ADMIN — RISPERIDONE 1 MG: 1 TABLET ORAL at 20:38

## 2019-08-12 RX ADMIN — SODIUM CHLORIDE TAB 1 GM 1 G: 1 TAB at 20:38

## 2019-08-12 RX ADMIN — MULTIPLE VITAMINS W/ MINERALS TAB 1 TABLET: TAB at 08:46

## 2019-08-12 RX ADMIN — LITHIUM CARBONATE 300 MG: 300 CAPSULE ORAL at 20:37

## 2019-08-12 RX ADMIN — LITHIUM CARBONATE 300 MG: 300 CAPSULE ORAL at 08:47

## 2019-08-12 RX ADMIN — CEPHALEXIN 500 MG: 500 CAPSULE ORAL at 08:46

## 2019-08-12 RX ADMIN — SODIUM CHLORIDE TAB 1 GM 1 G: 1 TAB at 08:47

## 2019-08-12 RX ADMIN — OYSTER SHELL CALCIUM WITH VITAMIN D 1 TABLET: 500; 200 TABLET, FILM COATED ORAL at 08:47

## 2019-08-12 RX ADMIN — PRAVASTATIN SODIUM 20 MG: 20 TABLET ORAL at 20:38

## 2019-08-12 RX ADMIN — VITAMIN D, TAB 1000IU (100/BT) 1000 UNITS: 25 TAB at 08:46

## 2019-08-12 RX ADMIN — MELATONIN 3 MG ORAL TABLET 6 MG: 3 TABLET ORAL at 19:09

## 2019-08-12 NOTE — PROGRESS NOTES
Inpatient Occupational Therapy  Evaluation and Treatment    Unit: [facility]  Date:  8/12/2019  Patient Name:    Cristóbal Todd  Admitting diagnosis:  MDD (major depressive disorder), recurrent episode [facility]) [F33.9]  Admit Date:  8/7/2019  Precautions/Restrictions/WB Status/ Lines/ Wounds/ Oxygen: fall risk, standard BHI precautions    Treatment Time:  8283-1683  Treatment Number: 1   Billable Treatment Time: 43 minutes   Total Treatment Time:   53   minutes    Patient Goals for Therapy:  \" get out of here \"      Discharge Recommendations: SNF   DME needs for discharge: defer to facility       Therapy recommendations for staff:   Assist of 1 with use of rolling walker (RW) and gait belt for all transfers and ambulation within community room     [address] [place] [address], Ne: 2/30    Able to identify rhinoceros correctly and state that train and bicycle are both forms of transportation. Became alarmed during testing and stated, \"I don't know! And what am I gonna do?!\"  Reassurance provided. Home Health S4 Level Recommendation:  NA  AM-PAC Score: 18    Preadmission Environment    Pt. Lives                                  Alone                             Home environment:                apartment  ([address] [facility] [address])  Steps to enter first floor:         No steps            Bathroom:                               Tub/Shower unit, Grab bars and Shower Chair , elevated commode  Equipment owned:                  [address] [facility] [address] [facility]     Preadmission Status / PLOF:  History of falls                                     Unknown  Pt. Able to drive                                  No  Pt independent with ADLs                  Yes              Pt. Required assistance from family for:                            Cleaning, Cooking and Laundry     Pt. Fully independent for transfers and gait and walked with:                          Angelica Portillo     History of current Episode: \"72 y. o. female with Vitamin D Deficiency, Osteoporosis, arthritis,

## 2019-08-12 NOTE — PLAN OF CARE
Problem: Falls - Risk of:  Goal: Will remain free from falls  Outcome: Ongoing  Fall risk, armband on, call light within reach, bed in lowest/locked position.

## 2019-08-13 PROCEDURE — 99232 SBSQ HOSP IP/OBS MODERATE 35: CPT | Performed by: PSYCHIATRY & NEUROLOGY

## 2019-08-13 PROCEDURE — 97110 THERAPEUTIC EXERCISES: CPT

## 2019-08-13 PROCEDURE — 97116 GAIT TRAINING THERAPY: CPT

## 2019-08-13 PROCEDURE — 6370000000 HC RX 637 (ALT 250 FOR IP): Performed by: PSYCHIATRY & NEUROLOGY

## 2019-08-13 PROCEDURE — 1240000000 HC EMOTIONAL WELLNESS R&B

## 2019-08-13 RX ADMIN — SODIUM CHLORIDE TAB 1 GM 1 G: 1 TAB at 08:47

## 2019-08-13 RX ADMIN — VITAMIN D, TAB 1000IU (100/BT) 1000 UNITS: 25 TAB at 08:46

## 2019-08-13 RX ADMIN — RISPERIDONE 1 MG: 1 TABLET ORAL at 19:51

## 2019-08-13 RX ADMIN — SODIUM CHLORIDE TAB 1 GM 1 G: 1 TAB at 19:52

## 2019-08-13 RX ADMIN — PRAVASTATIN SODIUM 20 MG: 20 TABLET ORAL at 19:52

## 2019-08-13 RX ADMIN — OYSTER SHELL CALCIUM WITH VITAMIN D 1 TABLET: 500; 200 TABLET, FILM COATED ORAL at 08:47

## 2019-08-13 RX ADMIN — LITHIUM CARBONATE 300 MG: 300 CAPSULE ORAL at 19:51

## 2019-08-13 RX ADMIN — MELATONIN 3 MG ORAL TABLET 6 MG: 3 TABLET ORAL at 18:40

## 2019-08-13 RX ADMIN — DIVALPROEX SODIUM 500 MG: 500 TABLET, DELAYED RELEASE ORAL at 19:51

## 2019-08-13 RX ADMIN — MULTIPLE VITAMINS W/ MINERALS TAB 1 TABLET: TAB at 08:47

## 2019-08-13 RX ADMIN — LEVOTHYROXINE SODIUM 88 MCG: 88 TABLET ORAL at 06:46

## 2019-08-13 RX ADMIN — OYSTER SHELL CALCIUM WITH VITAMIN D 1 TABLET: 500; 200 TABLET, FILM COATED ORAL at 19:51

## 2019-08-13 RX ADMIN — LITHIUM CARBONATE 300 MG: 300 CAPSULE ORAL at 08:47

## 2019-08-13 NOTE — PROGRESS NOTES
Status Examination:    Appearance: WF, appears stated age, wearing pajamas, good grooming and hygiene  Behavior/Attitude toward examiner:  cooperative, improved attention, fair eye contact  Speech:  spontaneous, normal rate, normal volume, non-pressured, anxious tone  Mood:  \"worried\"  Affect:  Mood congruent, mildly anxious  Thought processes:  [place]  Thought Content: Denies SI, denies HI, continues to worry about where she will go from the hospital and when, +confabulation  Perceptions: Denies AVH, not actively RTIS  Attention: impaired  Abstraction: [place]  Cognition: Average KASIA, Alert and oriented to person, but not place, time, or situation, recall impaired  Insight: impaired insight   Judgment: impaired judgment      LAB: Reviewed labs from last 24 hours      Dx:   Primary Psychiatric (DSM V) Diagnosis: Bipolar I disorder  Secondary Psychiatric (DSM V) Diagnoses: Unspecified neurocognitive disorder  Chemical Dependency Diagnoses: Alcohol use disorder, severe, in partial remission, Tobacco use disorder, severe  Active Medical Diagnoses: UTI, HLD, Hypothyroidism, Osteoporosis      All conditions detailed above are being treated while patient is hospitalized.      Tx plan: Generally: prevent self injury/aggression, stabilize mood/anxiety/psychotic/behavioral disturbance, establish/maintain aftercare, increase coping mechanisms, improve medication compliance.  All conditions present on admission are being treated while pt is hospitalized.      Legal Status: Voluntary     Primary Psychiatric Issues:  1. Bipolar disorder; unspecified neurocognitive disorder:  Pt's symptoms suggest primarily a cognitive etiology. During this admission she has not appeared manic, depressed, or psychotic. Rather, she has presented as cognitively impaired and anxious secondary to her cognitive impairment.   We suspect that she is either delirious or beginning to manifest signs of dementia which has previously been undiagnosed

## 2019-08-13 NOTE — PROGRESS NOTES
Inpatient Geriatric Behavior Health Physical Therapy Daily Treatment Note    Unit: [facility]  Date:  8/13/2019  Patient Name:    Cat Ross  Admitting diagnosis:  MDD (major depressive disorder), recurrent episode [facility]) [F33.9]  Admit Date:  8/7/2019  Precautions/Restrictions:    Standard BHI Precautions  Fall Risk      Discharge Recommendations from PHYSICAL perspective:                                          SNF  DME needs for discharge:     defer to facility     Therapy recommendations for staff:   Assist of 1 with use of rolling walker (RW) and gait belt for all transfers and ambulation within community room/bedroom    [facility] S4 Level Recommendation: NA   AM-PAC Mobility Score         AM-PAC Inpatient Mobility without Stair Climbing Raw Score : 15    Treatment Time:  12:35 - 1300  Treatment number: 2     Subjective:    Pt. Agreeable to tx, cooperative and pleasant. Cognition  impaired    Pain   No    Bed Mobility   Supine to Sit: Not Tested  Sit to Supine: Not Tested  Rolling:  Not Tested  Scooting:  Not Tested    Transfer Training   Sit to stand:  Supervision  Stand to sit:  Supervision  Bed to Chair: Supervision    Gait Training   Deviations (firm surface/linoleum):decreased fabian, Increased MARCOS, Decreased step length on R, Decreased step length on L and forward flexed posture   Level of Assist:    Supervision  Assistive Device Used:    rolling walker (RW)  Distance:  250 ft  Cued on: Patient needed occasional cueing for negotiating walker at narrow spaces. WC Training NA    Therapeutic Exercises   Exercises in bold performed in unit today. Items not bolded are carried forward from prior visits for continuity of the record. Exercise/Equipment Resistance/Repetitions Other comments     Strengthening exercises       Ankle pumps 20 reps       LAQ against green theraband resistance 10 reps x 2 sets    Seated marching against green theraband resistance 10 reps x 2 sets.     Supported standing heel

## 2019-08-13 NOTE — GROUP NOTE
Group Therapy Note    Date: August 13    Group Start Time: 1400  Group End Time: 8507  Group Topic: Recreational    Francisco; Miguel Lauren        Group Therapy Note    Attendees: 2    Patient's Goal: to actively engage in watching \"I Love Janet\" and reminisce with peers to promote positive socialization, improve mood, promote relaxation, and improve overall quality of life. Notes: Claire Graf actively engaged in watching \"I Love Janet\" intermittently. Pt appeared restless, as demonstrated by standing up and walking to her room multiple times during group. Claire Graf returned to group multiple times and continued to watch with peers. Pt positively socialized. Claire Graf sat in the group for approximately 10 minutes at a time. Pt expressed enjoyment of activity at conclusion, stating \"I Love Sidonie Sails is one of my favorites. I enjoyed this. \"     Status After Intervention:  Improved    Participation Level:  Active Listener and Interactive    Participation Quality: Appropriate and Attentive      Speech:  normal      Thought Process/Content: Linear      Affective Functioning: Constricted/Restricted      Mood: anxious and restless      Level of consciousness:  Alert and Attentive      Response to Learning: Progressing to goal      Endings: None Reported    Modes of Intervention: Support, Socialization, Activity and Media      Discipline Responsible: Psychoeducational Specialist      Signature:  Jese Kramer, MT-DANGELO Thompson

## 2019-08-13 NOTE — GROUP NOTE
Group Therapy Note    Date: August 13    Group Start Time: 0900  Group End Time: 0930  Group Topic: Community Meeting    [facility]        Group Therapy Note    Attendees: 6    Patient's Goal: to set a daily goal.    Notes: Genoveva Halsted shared with CTRS her goal is \"to get some water. \" Genoveva Halsted was provided with water. Status After Intervention:  Improved    Participation Level:  Active Listener and Interactive    Participation Quality: Appropriate, Attentive and Sharing      Speech:  normal      Thought Process/Content: Flight of ideas      Affective Functioning: Flat      Mood: depressed      Level of consciousness:  Alert and Attentive      Response to Learning: Able to change behavior and Progressing to goal      Endings: None Reported    Modes of Intervention: Education, Support, Socialization, Exploration, Clarifying, Problem-solving and Activity      Discipline Responsible: Psychoeducational Specialist      Signature:  Royal Saldivar

## 2019-08-14 VITALS
TEMPERATURE: 97 F | DIASTOLIC BLOOD PRESSURE: 68 MMHG | WEIGHT: 123 LBS | HEART RATE: 74 BPM | SYSTOLIC BLOOD PRESSURE: 108 MMHG | BODY MASS INDEX: 22.63 KG/M2 | RESPIRATION RATE: 16 BRPM | HEIGHT: 62 IN | OXYGEN SATURATION: 98 %

## 2019-08-14 PROBLEM — E43 SEVERE PROTEIN-CALORIE MALNUTRITION (HCC): Status: RESOLVED | Noted: 2019-08-09 | Resolved: 2019-08-14

## 2019-08-14 PROBLEM — F33.9 MDD (MAJOR DEPRESSIVE DISORDER), RECURRENT EPISODE (HCC): Status: RESOLVED | Noted: 2019-08-07 | Resolved: 2019-08-14

## 2019-08-14 PROCEDURE — 5130000000 HC BRIDGE APPOINTMENT

## 2019-08-14 PROCEDURE — 6370000000 HC RX 637 (ALT 250 FOR IP): Performed by: PSYCHIATRY & NEUROLOGY

## 2019-08-14 PROCEDURE — 99239 HOSP IP/OBS DSCHRG MGMT >30: CPT | Performed by: PSYCHIATRY & NEUROLOGY

## 2019-08-14 RX ORDER — LANOLIN ALCOHOL/MO/W.PET/CERES
6 CREAM (GRAM) TOPICAL NIGHTLY
Qty: 60 TABLET | Refills: 0
Start: 2019-08-14 | End: 2020-11-17

## 2019-08-14 RX ORDER — RISPERIDONE 1 MG/1
1 TABLET, FILM COATED ORAL NIGHTLY
Qty: 30 TABLET | Refills: 0
Start: 2019-08-14 | End: 2019-10-29

## 2019-08-14 RX ADMIN — MULTIPLE VITAMINS W/ MINERALS TAB 1 TABLET: TAB at 08:37

## 2019-08-14 RX ADMIN — VITAMIN D, TAB 1000IU (100/BT) 1000 UNITS: 25 TAB at 08:37

## 2019-08-14 RX ADMIN — LITHIUM CARBONATE 300 MG: 300 CAPSULE ORAL at 08:37

## 2019-08-14 RX ADMIN — OYSTER SHELL CALCIUM WITH VITAMIN D 1 TABLET: 500; 200 TABLET, FILM COATED ORAL at 08:37

## 2019-08-14 RX ADMIN — LEVOTHYROXINE SODIUM 88 MCG: 88 TABLET ORAL at 06:39

## 2019-08-14 RX ADMIN — SODIUM CHLORIDE TAB 1 GM 1 G: 1 TAB at 08:37

## 2019-08-14 NOTE — DISCHARGE SUMMARY
Dextromethorphan-guaiFENesin  MG/5ML SYRP Take 10 mLs by mouth every 4 hours as needed for Cough              Multiple Neuroleptics? No    Reason for admission: Patient is an 68 y.o. female with history of bipolar I disorder and alcohol use disorder who was admitted to the the older adult behavioral unit on 8/7/2019 for subacute change in mental status. Patient met with and was treated by an interdisciplinary treatment team that included social work, occupational therapy, recreational therapy, nursing, and psychiatry. [facility]:    1. Unspecified major neurocognitive disorder; bipolar disorder:    Patient has a longstanding history of bipolar I disorder as well as severe alcohol use disorder, now in partial remission. For the last 2 years she has resided in a long term care facility, [address] [place] [address]. This hospitalization was precipitated by a subacute change in patient's mental status and behavior. Evidently, she has demonstrated difficulty with achieving stable mood stabilization over the last 12 months and her outpatient psychiatrist, Jennifer Wang, has tried several different combinations of medications in the hopes of achieving mood stability. He reported that, 10 days prior to admission here she looked to be doing quite well. Yet, about 7 days prior to admission, she began to demonstrate confusion, disorientation, restlessness, anxiety and an inconsistent sleep pattern. Pt had been diagnosed with a UTI around this time. Over the course of this admission we did not observe behavior in patient that was consistent with an exacerbation of her underlying bipolar disorder. Of note, levels of her home lithium and valproic acid were both within range on admission. She did not clearly evidence symptoms of skyler, depression, or psychosis. Rather, the most prominent finding consistently was cognitive impairment.   Pt was consistently
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
## 2019-08-14 NOTE — PLAN OF CARE
[facility]  Discharge Note    Pt discharged with followings belongings:   Dentures: None  Vision - Corrective Lenses: None  Hearing Aid: None  Jewelry: None  Body Piercings Removed: No  Clothing: Pants, Shirt, Undergarments (Comment)  Were All Patient Medications Collected?: Not Applicable  Other Valuables: None   Valuables sent home with Patient. and returned to patient. Patient education on aftercare instructions: yes  Information faxed to SNF by RN Patient verbalize understanding of AVS:  yes.     Status EXAM upon discharge:  Status and Exam  Normal: No  Facial Expression: Sad, Worried  Affect: Congruent  Level of Consciousness: Confused  Mood:Normal: No  Mood: Sad, Helpless  Motor Activity:Normal: No  Motor Activity: Decreased  Interview Behavior: Cooperative, Impulsive  Preception: [place] to Person  Attention:Normal: No  Attention: Unable to Concentrate  Thought Processes: Perseveration  Thought Content:Normal: No  Thought Content: Preoccupations  Hallucinations: None  Delusions: No  Memory:Normal: No  Memory: Poor Recent, Poor Remote  Insight and Judgment: No  Insight and Judgment: Poor Judgment, Poor Insight  Present Suicidal Ideation: No  Present Homicidal Ideation: No      Metabolic Screening:    No results found for: LABA1C    Lab Results   Component Value Date    CHOL 164 03/04/2019    CHOL 180 03/05/2018    CHOL 174 02/15/2017    CHOL 146 12/08/2015    CHOL 158 01/28/2015    CHOL 153 09/24/2013    CHOL 150 09/12/2012    CHOL 139 06/01/2011     Lab Results   Component Value Date    TRIG 86 03/04/2019    TRIG 93 03/05/2018    TRIG 50 02/15/2017    TRIG 70 12/08/2015    TRIG 82 01/28/2015    TRIG 60 09/24/2013    TRIG 65 09/12/2012    TRIG 58 06/01/2011     Lab Results   Component Value Date    HDL 66 (H) 03/04/2019    HDL 89 (H) 03/05/2018    HDL 88 (H) 02/15/2017    HDL 69 (H) 12/08/2015    HDL 71 (H) 01/28/2015    HDL 73 (H) 09/24/2013    HDL 65 (H) 09/12/2012    HDL 53 06/01/2011     No

## 2019-08-19 ENCOUNTER — TELEPHONE (OUTPATIENT)
Dept: FAMILY MEDICINE CLINIC | Age: 74
End: 2019-08-19

## 2019-08-27 ENCOUNTER — OFFICE VISIT (OUTPATIENT)
Dept: FAMILY MEDICINE CLINIC | Age: 74
End: 2019-08-27
Payer: MEDICARE

## 2019-08-27 VITALS
SYSTOLIC BLOOD PRESSURE: 94 MMHG | OXYGEN SATURATION: 95 % | HEART RATE: 74 BPM | TEMPERATURE: 97.7 F | DIASTOLIC BLOOD PRESSURE: 60 MMHG | BODY MASS INDEX: 22.63 KG/M2 | WEIGHT: 123 LBS | HEIGHT: 62 IN

## 2019-08-27 DIAGNOSIS — F31.9 BIPOLAR 1 DISORDER (HCC): ICD-10-CM

## 2019-08-27 DIAGNOSIS — E78.00 PURE HYPERCHOLESTEROLEMIA: ICD-10-CM

## 2019-08-27 DIAGNOSIS — E03.9 ACQUIRED HYPOTHYROIDISM: ICD-10-CM

## 2019-08-27 DIAGNOSIS — F10.11 NONDEPENDENT ALCOHOL ABUSE, IN REMISSION: ICD-10-CM

## 2019-08-27 DIAGNOSIS — F17.200 TOBACCO USE DISORDER: ICD-10-CM

## 2019-08-27 DIAGNOSIS — R41.89 ACUTE COGNITIVE DECLINE: Primary | ICD-10-CM

## 2019-08-27 PROCEDURE — 1111F DSCHRG MED/CURRENT MED MERGE: CPT | Performed by: FAMILY MEDICINE

## 2019-08-27 PROCEDURE — 3017F COLORECTAL CA SCREEN DOC REV: CPT | Performed by: FAMILY MEDICINE

## 2019-08-27 PROCEDURE — G8399 PT W/DXA RESULTS DOCUMENT: HCPCS | Performed by: FAMILY MEDICINE

## 2019-08-27 PROCEDURE — 99214 OFFICE O/P EST MOD 30 MIN: CPT | Performed by: FAMILY MEDICINE

## 2019-08-27 PROCEDURE — 4040F PNEUMOC VAC/ADMIN/RCVD: CPT | Performed by: FAMILY MEDICINE

## 2019-08-27 PROCEDURE — 1036F TOBACCO NON-USER: CPT | Performed by: FAMILY MEDICINE

## 2019-08-27 PROCEDURE — G8427 DOCREV CUR MEDS BY ELIG CLIN: HCPCS | Performed by: FAMILY MEDICINE

## 2019-08-27 PROCEDURE — 1090F PRES/ABSN URINE INCON ASSESS: CPT | Performed by: FAMILY MEDICINE

## 2019-08-27 PROCEDURE — G8420 CALC BMI NORM PARAMETERS: HCPCS | Performed by: FAMILY MEDICINE

## 2019-08-27 PROCEDURE — 1123F ACP DISCUSS/DSCN MKR DOCD: CPT | Performed by: FAMILY MEDICINE

## 2019-08-27 RX ORDER — LEVOTHYROXINE SODIUM 0.05 MG/1
50 TABLET ORAL DAILY
Qty: 30 TABLET | Refills: 5 | Status: SHIPPED | OUTPATIENT
Start: 2019-08-27 | End: 2020-08-17 | Stop reason: SDUPTHER

## 2019-08-27 NOTE — PROGRESS NOTES
Subjective:      Patient ID: Ashlie Dickens is a 68 y.o. female. Blood pressure 94/60, pulse 74, temperature 97.7 °F (36.5 °C), temperature source Oral, height 5' 2\" (1.575 m), weight 123 lb (55.8 kg), SpO2 95 %, not currently breastfeeding. HPI here with brother for check up. Living at [address] [place] [address], assisted living. She was sent to ER due to erratic behavior. Admitted to kateryna-psych at [facility]. Psychiatry thought that her bipolar was likely stable, but thought she had delerium and mild dementia and UTI likely was cause for acute decompansation. It was unable to be certain if alcohol use was involved in her decompensation. She does not remember most of this. She has been sober for 12-13 years until had relapse of alcohol use this past summer and obtained alcohol somehow at the [facility]. Hx wernecke's due to chronic alcholism. Not smoking now- has to go off property. Which is not feasible for her due to needing to use a walker due to chronic ataxia. Brother is concerned that she is more withdrawn. Not going out to [employer]ec[employer], participating in games like Beibamboo  or kenton alvarado, since June. Not eating as well as she was before and has lost weight. Her ability to identify objects is still hit and miss- even simple things that are in her purse (her brother was testing her in the room, asking her to name objects in her purse and she was confused about what was her phone and what was her remote control). This confusion is new, per her Brother. She reports just feeling tired and bad. DR Olivia Tyler is psychiatrist managing bipolar. Has seen him 3x in July. Risperdal was added to depakote and Lithium (both of which had therapeutic serum levels at admission). She wishes to remain at [facility]. In reviewing her admission labs, TSH was suppressed to 0.05. Her current dose of synthroid is 88 mcg. No chest pains, dizziness, heart palpitations, dyspnea, lightheadedness, worsening edema.      Has not

## 2019-09-04 ENCOUNTER — HOSPITAL ENCOUNTER (OUTPATIENT)
Dept: MRI IMAGING | Age: 74
Discharge: HOME | End: 2019-09-04
Payer: MEDICARE

## 2019-09-04 DIAGNOSIS — R41.89 ACUTE COGNITIVE DECLINE: ICD-10-CM

## 2019-09-04 PROCEDURE — 70551 MRI BRAIN STEM W/O DYE: CPT

## 2019-09-26 ENCOUNTER — TELEPHONE (OUTPATIENT)
Dept: FAMILY MEDICINE CLINIC | Age: 74
End: 2019-09-26

## 2019-10-03 DIAGNOSIS — E03.9 ACQUIRED HYPOTHYROIDISM: ICD-10-CM

## 2019-10-03 LAB — TSH SERPL DL<=0.05 MIU/L-ACNC: 2.27 UIU/ML (ref 0.27–4.2)

## 2019-10-29 ENCOUNTER — OFFICE VISIT (OUTPATIENT)
Dept: FAMILY MEDICINE CLINIC | Age: 74
End: 2019-10-29
Payer: MEDICARE

## 2019-10-29 VITALS
WEIGHT: 125 LBS | BODY MASS INDEX: 23 KG/M2 | HEART RATE: 76 BPM | OXYGEN SATURATION: 94 % | HEIGHT: 62 IN | SYSTOLIC BLOOD PRESSURE: 118 MMHG | TEMPERATURE: 97.5 F | DIASTOLIC BLOOD PRESSURE: 80 MMHG

## 2019-10-29 DIAGNOSIS — R29.6 FREQUENT FALLS: ICD-10-CM

## 2019-10-29 DIAGNOSIS — E03.9 ACQUIRED HYPOTHYROIDISM: Primary | ICD-10-CM

## 2019-10-29 DIAGNOSIS — M81.0 SENILE OSTEOPOROSIS: ICD-10-CM

## 2019-10-29 DIAGNOSIS — F31.9 BIPOLAR 1 DISORDER (HCC): ICD-10-CM

## 2019-10-29 DIAGNOSIS — E78.00 PURE HYPERCHOLESTEROLEMIA: ICD-10-CM

## 2019-10-29 PROBLEM — S12.501D CLOSED NONDISPLACED FRACTURE OF SIXTH CERVICAL VERTEBRA WITH ROUTINE HEALING: Status: RESOLVED | Noted: 2017-09-05 | Resolved: 2019-10-29

## 2019-10-29 PROBLEM — S02.19XA CLOSED FRACTURE OF TEMPORAL BONE (HCC): Status: RESOLVED | Noted: 2017-07-31 | Resolved: 2019-10-29

## 2019-10-29 PROCEDURE — G8399 PT W/DXA RESULTS DOCUMENT: HCPCS | Performed by: FAMILY MEDICINE

## 2019-10-29 PROCEDURE — 1036F TOBACCO NON-USER: CPT | Performed by: FAMILY MEDICINE

## 2019-10-29 PROCEDURE — 99214 OFFICE O/P EST MOD 30 MIN: CPT | Performed by: FAMILY MEDICINE

## 2019-10-29 PROCEDURE — G8427 DOCREV CUR MEDS BY ELIG CLIN: HCPCS | Performed by: FAMILY MEDICINE

## 2019-10-29 PROCEDURE — 3017F COLORECTAL CA SCREEN DOC REV: CPT | Performed by: FAMILY MEDICINE

## 2019-10-29 PROCEDURE — G8482 FLU IMMUNIZE ORDER/ADMIN: HCPCS | Performed by: FAMILY MEDICINE

## 2019-10-29 PROCEDURE — 1123F ACP DISCUSS/DSCN MKR DOCD: CPT | Performed by: FAMILY MEDICINE

## 2019-10-29 PROCEDURE — 4040F PNEUMOC VAC/ADMIN/RCVD: CPT | Performed by: FAMILY MEDICINE

## 2019-10-29 PROCEDURE — 1090F PRES/ABSN URINE INCON ASSESS: CPT | Performed by: FAMILY MEDICINE

## 2019-10-29 PROCEDURE — G8420 CALC BMI NORM PARAMETERS: HCPCS | Performed by: FAMILY MEDICINE

## 2019-12-07 ENCOUNTER — TELEPHONE (OUTPATIENT)
Dept: FAMILY MEDICINE CLINIC | Age: 74
End: 2019-12-07

## 2020-01-20 ENCOUNTER — TELEPHONE (OUTPATIENT)
Dept: FAMILY MEDICINE CLINIC | Age: 75
End: 2020-01-20

## 2020-04-14 ENCOUNTER — TELEPHONE (OUTPATIENT)
Dept: FAMILY MEDICINE CLINIC | Age: 75
End: 2020-04-14

## 2020-04-14 NOTE — TELEPHONE ENCOUNTER
How is patient's behavior off? Any urinary symptoms? Per Dr. Luis Felipe Lau dose he was not changing her levothyroxine dose. Is she having hypothyroid symptoms- increased fatigue, hair loss, constipation, dry skin? On recent labs glucose was normal. Why do they want to check for diabetes? When was her last CBC checked?

## 2020-04-14 NOTE — TELEPHONE ENCOUNTER
She is very obsessed with things, like having extra soft drinks in her room, but her brother just dropped of three 12 packs, wants extra cookies , but her brother just dropped of 2 bags of them. - But she is not drinking or eating either of those things. Doesn't realize she has them, even when she is told she does. Can't remember how to work her phone, how the remote for her TV works, no fatigue or hair loss. But does have slight constipation and dry skin on legs . said she acted like this the last time her thyroid results were high. That is why they asked about her med dose. No CBC for 1 year. Didn't check her urine, but they can if you want. But she is not having any urinary symptoms.

## 2020-04-14 NOTE — TELEPHONE ENCOUNTER
Pt behavior is off the charts the pt is very obsessed about things wondering because her thyroid is off   I see where we received the fax on the thyroid results in media   The pt is on the levothyroxine 50 MCG are we changing the medication ?     Can we do labs for diabetes and anemia   The pt wants ice and water constantly so they are suspecting this     Please call Jose Llanos

## 2020-04-14 NOTE — TELEPHONE ENCOUNTER
We just received her labs by fax today from 4/7/20. Looks like she had CMP, Lithium & TSH. all were normal except TSH was 7.621 (H)  Her TSH was 4.274 on 1/21/20   and 1.715 on 9/27/19. She is on 50 mcg Levothyroxine. Her glucose was 65 on 4/7/20.

## 2020-04-15 LAB
HCT VFR BLD CALC: 43.9 % (ref 36–46)
HEMOGLOBIN: 14.3 G/DL (ref 12–16)
PLATELET # BLD: 165 K/ΜL
WBC # BLD: 6.8 10^3/ML

## 2020-06-01 ENCOUNTER — VIRTUAL VISIT (OUTPATIENT)
Dept: FAMILY MEDICINE CLINIC | Age: 75
End: 2020-06-01
Payer: MEDICARE

## 2020-06-01 VITALS
TEMPERATURE: 97.9 F | HEART RATE: 75 BPM | BODY MASS INDEX: 24.49 KG/M2 | WEIGHT: 138.2 LBS | RESPIRATION RATE: 38 BRPM | HEIGHT: 63 IN | SYSTOLIC BLOOD PRESSURE: 137 MMHG | DIASTOLIC BLOOD PRESSURE: 103 MMHG

## 2020-06-01 PROCEDURE — 4040F PNEUMOC VAC/ADMIN/RCVD: CPT | Performed by: NURSE PRACTITIONER

## 2020-06-01 PROCEDURE — 1036F TOBACCO NON-USER: CPT | Performed by: NURSE PRACTITIONER

## 2020-06-01 PROCEDURE — 1090F PRES/ABSN URINE INCON ASSESS: CPT | Performed by: NURSE PRACTITIONER

## 2020-06-01 PROCEDURE — G8399 PT W/DXA RESULTS DOCUMENT: HCPCS | Performed by: NURSE PRACTITIONER

## 2020-06-01 PROCEDURE — 99214 OFFICE O/P EST MOD 30 MIN: CPT | Performed by: NURSE PRACTITIONER

## 2020-06-01 PROCEDURE — 3017F COLORECTAL CA SCREEN DOC REV: CPT | Performed by: NURSE PRACTITIONER

## 2020-06-01 PROCEDURE — G8420 CALC BMI NORM PARAMETERS: HCPCS | Performed by: NURSE PRACTITIONER

## 2020-06-01 PROCEDURE — G8427 DOCREV CUR MEDS BY ELIG CLIN: HCPCS | Performed by: NURSE PRACTITIONER

## 2020-06-01 PROCEDURE — 1123F ACP DISCUSS/DSCN MKR DOCD: CPT | Performed by: NURSE PRACTITIONER

## 2020-06-01 RX ORDER — CEPHALEXIN 500 MG/1
500 CAPSULE ORAL 3 TIMES DAILY
Qty: 21 CAPSULE | Refills: 0 | Status: SHIPPED | OUTPATIENT
Start: 2020-06-01 | End: 2020-06-08

## 2020-06-01 ASSESSMENT — ENCOUNTER SYMPTOMS
WHEEZING: 0
SHORTNESS OF BREATH: 0
COLOR CHANGE: 1
COUGH: 0

## 2020-06-01 NOTE — PROGRESS NOTES
2020    TELEHEALTH EVALUATION -- Audio/Visual (During LXNCD-06 public health emergency)    HPI:    Ashley Abad (:  1945) has requested an audio/video evaluation for the following concern(s):  Chief Complaint   Patient presents with    Manic Behavior     hasn't slept in few days - Has bi-polar    Leg Swelling     edema since sat - slight painful     Patient is a resident at [facility]. Nurse Esa Ferreira helped her with her video visit today. Patient reports that she started with edema in both legs a couple of weeks ago. Nurse noticed this past weekend. Has some scrapes on her left lower leg. There is now redness around the scrapes. Denies fever. She has not been sleeping well. Has not been raising her legs at rest.   History of mood disorder and is followed by Dr. Vazquez Hernandez. Nurses have call out to schedule f/u with psychiatrist.     Hypothyroid- 2020 TSH was 7.621  Patient brother reports that last time her legs were swollen her thyroid levels were off. Review of Systems   Constitutional: Positive for fatigue. Negative for activity change and fever. Respiratory: Negative for cough, shortness of breath and wheezing. Cardiovascular: Positive for leg swelling. Negative for chest pain and palpitations. Skin: Positive for color change. Psychiatric/Behavioral: Positive for sleep disturbance. The patient is nervous/anxious. Prior to Visit Medications    Medication Sig Taking?  Authorizing Provider   levothyroxine (SYNTHROID) 50 MCG tablet Take 1 tablet by mouth Daily  Ally Johnson MD   melatonin 3 MG TABS tablet Take 2 tablets by mouth nightly  Caprice Walsh MD   divalproex (DEPAKOTE) 250 MG DR tablet Take 500 mg by mouth nightly  Historical Provider, MD   lithium 300 MG capsule Take 300 mg by mouth 2 times daily  Historical Provider, MD   Multiple Vitamins-Minerals (THERAPEUTIC MULTIVITAMIN-MINERALS) tablet Take 1 tablet by mouth daily  Historical Provider, MD

## 2020-06-02 ENCOUNTER — TELEPHONE (OUTPATIENT)
Dept: FAMILY MEDICINE CLINIC | Age: 75
End: 2020-06-02

## 2020-06-02 NOTE — TELEPHONE ENCOUNTER
Jorge Watts is to have TSH drawn at [facility] by nursing staff. Please verify that to be done. Her hypothyroidism might be the cause. OK to add Lasix 40 mg + KCL 10 meq for a week as a trial.  Check BMP (chem 7) on Monday next week. (her baseline bmp is normal, 4/7/20). Ask If I need to send a script to pharmacy for her or not. Thanks.

## 2020-06-04 ENCOUNTER — TELEPHONE (OUTPATIENT)
Dept: FAMILY MEDICINE CLINIC | Age: 75
End: 2020-06-04

## 2020-06-09 ENCOUNTER — OFFICE VISIT (OUTPATIENT)
Dept: FAMILY MEDICINE CLINIC | Age: 75
End: 2020-06-09
Payer: MEDICARE

## 2020-06-09 VITALS
BODY MASS INDEX: 22.85 KG/M2 | WEIGHT: 129 LBS | DIASTOLIC BLOOD PRESSURE: 78 MMHG | HEART RATE: 74 BPM | SYSTOLIC BLOOD PRESSURE: 112 MMHG | OXYGEN SATURATION: 99 %

## 2020-06-09 DIAGNOSIS — R60.0 BILATERAL LEG EDEMA: ICD-10-CM

## 2020-06-09 DIAGNOSIS — R41.0 DELIRIUM: ICD-10-CM

## 2020-06-09 LAB
A/G RATIO: 2.9 (ref 1.1–2.2)
ALBUMIN SERPL-MCNC: 4.3 G/DL (ref 3.4–5)
ALP BLD-CCNC: 85 U/L (ref 40–129)
ALT SERPL-CCNC: 17 U/L (ref 10–40)
AMMONIA: 27 UMOL/L (ref 11–51)
ANION GAP SERPL CALCULATED.3IONS-SCNC: 9 MMOL/L (ref 3–16)
AST SERPL-CCNC: 27 U/L (ref 15–37)
BILIRUB SERPL-MCNC: 0.6 MG/DL (ref 0–1)
BUN BLDV-MCNC: 10 MG/DL (ref 7–20)
CALCIUM SERPL-MCNC: 9.5 MG/DL (ref 8.3–10.6)
CHLORIDE BLD-SCNC: 102 MMOL/L (ref 99–110)
CO2: 28 MMOL/L (ref 21–32)
CREAT SERPL-MCNC: 1.1 MG/DL (ref 0.6–1.2)
GFR AFRICAN AMERICAN: 59
GFR NON-AFRICAN AMERICAN: 49
GLOBULIN: 1.5 G/DL
GLUCOSE BLD-MCNC: 102 MG/DL (ref 70–99)
HCT VFR BLD CALC: 40.9 % (ref 36–48)
HEMOGLOBIN: 13.7 G/DL (ref 12–16)
MCH RBC QN AUTO: 31.3 PG (ref 26–34)
MCHC RBC AUTO-ENTMCNC: 33.4 G/DL (ref 31–36)
MCV RBC AUTO: 93.9 FL (ref 80–100)
PDW BLD-RTO: 14.6 % (ref 12.4–15.4)
PLATELET # BLD: 178 K/UL (ref 135–450)
PMV BLD AUTO: 11 FL (ref 5–10.5)
POTASSIUM SERPL-SCNC: 4 MMOL/L (ref 3.5–5.1)
RBC # BLD: 4.36 M/UL (ref 4–5.2)
REASON FOR REJECTION: NORMAL
REJECTED TEST: NORMAL
SODIUM BLD-SCNC: 139 MMOL/L (ref 136–145)
TOTAL PROTEIN: 5.8 G/DL (ref 6.4–8.2)
WBC # BLD: 6.7 K/UL (ref 4–11)

## 2020-06-09 PROCEDURE — 1090F PRES/ABSN URINE INCON ASSESS: CPT | Performed by: FAMILY MEDICINE

## 2020-06-09 PROCEDURE — 99214 OFFICE O/P EST MOD 30 MIN: CPT | Performed by: FAMILY MEDICINE

## 2020-06-09 PROCEDURE — 3017F COLORECTAL CA SCREEN DOC REV: CPT | Performed by: FAMILY MEDICINE

## 2020-06-09 PROCEDURE — 1036F TOBACCO NON-USER: CPT | Performed by: FAMILY MEDICINE

## 2020-06-09 PROCEDURE — G8420 CALC BMI NORM PARAMETERS: HCPCS | Performed by: FAMILY MEDICINE

## 2020-06-09 PROCEDURE — G8399 PT W/DXA RESULTS DOCUMENT: HCPCS | Performed by: FAMILY MEDICINE

## 2020-06-09 PROCEDURE — G8427 DOCREV CUR MEDS BY ELIG CLIN: HCPCS | Performed by: FAMILY MEDICINE

## 2020-06-09 PROCEDURE — 4040F PNEUMOC VAC/ADMIN/RCVD: CPT | Performed by: FAMILY MEDICINE

## 2020-06-09 PROCEDURE — 1123F ACP DISCUSS/DSCN MKR DOCD: CPT | Performed by: FAMILY MEDICINE

## 2020-06-09 RX ORDER — SPIRONOLACTONE 25 MG/1
25 TABLET ORAL DAILY
Qty: 30 TABLET | Refills: 3 | Status: SHIPPED | OUTPATIENT
Start: 2020-06-09 | End: 2020-11-17

## 2020-06-09 RX ORDER — FUROSEMIDE 40 MG/1
40 TABLET ORAL DAILY
Qty: 60 TABLET | Refills: 3 | Status: SHIPPED | OUTPATIENT
Start: 2020-06-09 | End: 2020-11-17

## 2020-06-09 NOTE — PROGRESS NOTES
Subjective:      Patient ID: Padmini Alford is a 69 y.o. female. Blood pressure 112/78, pulse 74, weight 129 lb (58.5 kg), SpO2 99 %, not currently breastfeeding. HPI here with brother for concerns of leg edema. Living at [facility]- tested 2x coronavirus- negative. Brother says leg edema has been present the past 6-8 weeks. Did see NP for this virtually 6/1 and was started on keflex 500 tid on the hunch that it may be cellulitis. She has taken it for a week and it did not improve her edema. She has been increasingly agitated at her NH, not sleeping. Is more confused at times. Dr Iam Neives started her on SEroquel a week ago to help with the agitation, which has helped her sleep at least.  But remains confused- thinks the remote is her phone. Says she can't  Find her phone as she is talking on it with her brother. On the call button for staff every 5 minutes. Edema is bilateral, left worse than right. She has had no other medicine changes. Her legs are a bit red now, milton on the left, with weeping. Recent labs have shown normal thyroid function. No SOB. Is emotional and upset. n fever/chills  No n/v/d  Diet is decent. No abd pain  No urine freqeuncy. Prior echo normal 2017 after syncope. Patient Active Problem List   Diagnosis    Hyperlipidemia    Thoracic vertebral fracture- T7 (12/2010)    B12 deficiency    KEE (stress urinary incontinence, female)    Hypothyroidism (acquired)    Wernicke's encephalopathy    Vitamin D deficiency    Nondependent alcohol abuse, in remission    Colonoscopy refused    Bipolar I disorder, current or most recent episode manic, in partial remission ([facility] [address]    Hx of breast cancer- 1999, left, lumpectomy/ALND (Dr Ewelina Shen)   [place] Frequent falls    Senile osteoporosis    Arthritis      Body mass index is 22.85 kg/m².     Wt Readings from Last 3 Encounters:   06/09/20 129 lb (58.5 kg)   06/01/20 138 lb 3.2 oz (62.7 kg)   10/29/19 125 lb (56.7 kg)

## 2020-06-10 ENCOUNTER — TELEPHONE (OUTPATIENT)
Dept: FAMILY MEDICINE CLINIC | Age: 75
End: 2020-06-10

## 2020-06-10 NOTE — TELEPHONE ENCOUNTER
[facility] is calling to have the office call about a urin culture that was rejected yesterday. The sample needs to be collected in a sterile container.   Please call the lab at [phone]    Please advise    Thanks

## 2020-06-10 NOTE — TELEPHONE ENCOUNTER
Please have home health [facility] I think) to the urinalysis with reflex to culture. There should be a number in her chart as there have been several calls on her. Thanks.

## 2020-06-11 ENCOUNTER — TELEPHONE (OUTPATIENT)
Dept: FAMILY MEDICINE CLINIC | Age: 75
End: 2020-06-11

## 2020-06-12 NOTE — TELEPHONE ENCOUNTER
Talked to staff later yesterday: legs appear red and infected. Also her UA shows significant UTI, which might explain her behavior changes. I started doxy 100 + cipro 250 BID for 10 days each. We are awaiting the urine culture, however. They will report on her progress next week.

## 2020-08-17 ENCOUNTER — TELEPHONE (OUTPATIENT)
Dept: FAMILY MEDICINE CLINIC | Age: 75
End: 2020-08-17

## 2020-08-17 RX ORDER — LEVOTHYROXINE SODIUM 0.05 MG/1
50 TABLET ORAL DAILY
Qty: 90 TABLET | Refills: 1 | Status: SHIPPED | OUTPATIENT
Start: 2020-08-17 | End: 2020-11-18

## 2020-08-17 NOTE — TELEPHONE ENCOUNTER
Bettina Talbert, pt's full time nurse in [address] [place] [address] calling to let dr. Kat Palacios know that pt has not had her levothyroxine since 7/27   According to nurse, pharmacy discontinued the medication   Nurse is reporting that patient's skin is drying and have bruising. Pt stated this happens when her thyroid is off. According to nurse, pt states she doesn't have a thyroid anymore. Can thyroid medication be re prescribed or does she need to have labs done again before medication can be re prescribed?        Please advise

## 2020-08-17 NOTE — TELEPHONE ENCOUNTER
TSH 6/8/20 was 1.519. If she was taking 50 mcg a day, that means that this dose is ideal.    The refill was last sent to [place] a year ago. Is she still using this pharmacy? Looks like she uses [address] in [facility]. Will send refill there. Please let her nursing staff know at [facility] [place].

## 2020-08-18 NOTE — TELEPHONE ENCOUNTER
Informed July of this, that med sent to skilled nursing pharmacy in [place]. She said this is correct. Told her recheck the TSH in 3 months, after restarting med. Then labs will be every 6 months, unless something special needs to be done.

## 2020-10-20 ENCOUNTER — TELEPHONE (OUTPATIENT)
Dept: FAMILY MEDICINE CLINIC | Age: 75
End: 2020-10-20

## 2020-10-20 NOTE — TELEPHONE ENCOUNTER
Best to see me first.  If the lesion is worrisome, then I can get her in quickly to a specialist.  But not if it is a benign process.

## 2020-10-20 NOTE — TELEPHONE ENCOUNTER
----- Message from Stellaisaiah Cox sent at 10/20/2020  9:17 AM EDT -----  Subject: Message to Provider    QUESTIONS  Information for Provider? patient's brother states that a nurse at   [facility] noticed scabbed area that fell off on the patients leg. nurse   recommended she see a dermatologist. brother wants to know if  would   like to see her first. if not he wants recommendation for derm. ---------------------------------------------------------------------------  --------------  Ammon Slot INFO  What is the best way for the office to contact you? OK to leave message on   voicemail  Preferred Call Back Phone Number? [phone]  ---------------------------------------------------------------------------  --------------  SCRIPT ANSWERS  Relationship to Patient? Sibling  Representative Name? Bradford Sandhoff  Is the Representative on the appropriate HIPAA document in Epic?  Yes

## 2020-10-23 ENCOUNTER — TELEPHONE (OUTPATIENT)
Dept: FAMILY MEDICINE CLINIC | Age: 75
End: 2020-10-23

## 2020-11-17 ENCOUNTER — OFFICE VISIT (OUTPATIENT)
Dept: FAMILY MEDICINE CLINIC | Age: 75
End: 2020-11-17
Payer: MEDICARE

## 2020-11-17 VITALS
HEIGHT: 63 IN | BODY MASS INDEX: 28 KG/M2 | DIASTOLIC BLOOD PRESSURE: 70 MMHG | WEIGHT: 158 LBS | OXYGEN SATURATION: 96 % | TEMPERATURE: 97.3 F | SYSTOLIC BLOOD PRESSURE: 124 MMHG | HEART RATE: 79 BPM

## 2020-11-17 DIAGNOSIS — E03.9 HYPOTHYROIDISM (ACQUIRED): ICD-10-CM

## 2020-11-17 PROBLEM — J42 CHRONIC BRONCHITIS (HCC): Status: ACTIVE | Noted: 2020-11-17

## 2020-11-17 LAB — TSH SERPL DL<=0.05 MIU/L-ACNC: 13.29 UIU/ML (ref 0.27–4.2)

## 2020-11-17 PROCEDURE — G8482 FLU IMMUNIZE ORDER/ADMIN: HCPCS | Performed by: FAMILY MEDICINE

## 2020-11-17 PROCEDURE — 4040F PNEUMOC VAC/ADMIN/RCVD: CPT | Performed by: FAMILY MEDICINE

## 2020-11-17 PROCEDURE — 1036F TOBACCO NON-USER: CPT | Performed by: FAMILY MEDICINE

## 2020-11-17 PROCEDURE — G8926 SPIRO NO PERF OR DOC: HCPCS | Performed by: FAMILY MEDICINE

## 2020-11-17 PROCEDURE — G8427 DOCREV CUR MEDS BY ELIG CLIN: HCPCS | Performed by: FAMILY MEDICINE

## 2020-11-17 PROCEDURE — 1090F PRES/ABSN URINE INCON ASSESS: CPT | Performed by: FAMILY MEDICINE

## 2020-11-17 PROCEDURE — 99214 OFFICE O/P EST MOD 30 MIN: CPT | Performed by: FAMILY MEDICINE

## 2020-11-17 PROCEDURE — 1123F ACP DISCUSS/DSCN MKR DOCD: CPT | Performed by: FAMILY MEDICINE

## 2020-11-17 PROCEDURE — G8417 CALC BMI ABV UP PARAM F/U: HCPCS | Performed by: FAMILY MEDICINE

## 2020-11-17 PROCEDURE — 3023F SPIROM DOC REV: CPT | Performed by: FAMILY MEDICINE

## 2020-11-17 PROCEDURE — G8399 PT W/DXA RESULTS DOCUMENT: HCPCS | Performed by: FAMILY MEDICINE

## 2020-11-17 PROCEDURE — 3017F COLORECTAL CA SCREEN DOC REV: CPT | Performed by: FAMILY MEDICINE

## 2020-11-17 RX ORDER — QUETIAPINE FUMARATE 25 MG/1
25 TABLET, FILM COATED ORAL
COMMUNITY
Start: 2020-10-01

## 2020-11-17 RX ORDER — HALOPERIDOL 0.5 MG/1
TABLET ORAL
COMMUNITY
Start: 2020-10-01 | End: 2020-11-17

## 2020-11-17 RX ORDER — QUETIAPINE FUMARATE 50 MG/1
250 TABLET, FILM COATED ORAL 2 TIMES DAILY
COMMUNITY
Start: 2020-10-01

## 2020-11-17 NOTE — PROGRESS NOTES
Subjective:      Patient ID: Darryl Gonzalez is a 77 y.o. female. Blood pressure 124/70, pulse 79, temperature 97.3 °F (36.3 °C), temperature source Temporal, height 5' 3\" (1.6 m), weight 158 lb (71.7 kg), SpO2 96 %, not currently breastfeeding. HPI    Chief Complaint   Patient presents with    Follow-up     f/u med check     Other     check sore on left lower leg - has had for years,       Here with brother for persistent sore on left medial leg. Present chronically. I have seen this before. Has had topical care per NH nursing staff. The scab comes off and bleeds occasionally. Is asymptomatic. She has chronic leg edema (mild now) with cellulitis this past June. Remains as a chronic scab/sore that occasionally weeps. No longer on diuretic therapy. Still living at [address] [place] [address]. Brother looks after her. Bipolar managed by Dr Kayley Valdez  Had delirium/agitation that needed adult psych admission a year ago. Stable since. Hypothyroid- on synthroid 50 due for recheck. Lab Results   Component Value Date    TSH 2.27 10/03/2019    TSH 0.05 08/07/2019    TSH 0.30 03/04/2019      Hx copd. Does not smoke at [address] No meds for lungs any longer. No chest pains, dizziness, heart palpitations, dyspnea, lightheadedness, worsening edema. Last renal function test:   Lab Results   Component Value Date     06/09/2020    K 4.0 06/09/2020    K 3.7 08/07/2019    BUN 10 06/09/2020    CREATININE 1.1 06/09/2020          Lab Results   Component Value Date    CHOL 164 03/04/2019    TRIG 86 03/04/2019    HDL 66 (H) 03/04/2019    LDLCALC 81 03/04/2019     Lab Results   Component Value Date    ALT 17 06/09/2020    AST 27 06/09/2020        Hx falls in past- using walker, no falls since 2019    Last DEXA was 3/18: osteoporosis. Has had 4 REclast infusions.      Patient Active Problem List   Diagnosis    Hyperlipidemia    Thoracic vertebral fracture- T7 (12/2010)    B12 deficiency    KEE (stress urinary incontinence, female)    Hypothyroidism (acquired)    Wernicke's encephalopathy    Vitamin D deficiency    Nondependent alcohol abuse, in remission    Colonoscopy refused    Bipolar I disorder, current or most recent episode manic, in partial remission ([facility] [address]    Hx of breast cancer- 1999, left, lumpectomy/ALND (Dr Aldo Barbour)   Unk Cinthia Frequent falls    Senile osteoporosis    Arthritis      Body mass index is 27.99 kg/m². Wt Readings from Last 3 Encounters:   11/17/20 158 lb (71.7 kg)   06/09/20 129 lb (58.5 kg)   06/01/20 138 lb 3.2 oz (62.7 kg)      BP Readings from Last 3 Encounters:   11/17/20 124/70   06/09/20 112/78   06/01/20 (!) 137/103      Current Outpatient Medications   Medication Sig Dispense Refill    QUEtiapine (SEROQUEL) 200 MG tablet       QUEtiapine (SEROQUEL) 25 MG tablet       levothyroxine (SYNTHROID) 50 MCG tablet Take 1 tablet by mouth Daily 90 tablet 1    Multiple Vitamins-Minerals (THERAPEUTIC MULTIVITAMIN-MINERALS) tablet Take 1 tablet by mouth daily      acetaminophen (TYLENOL) 325 MG tablet Take 650 mg by mouth every 6 hours as needed for Pain      calcium-vitamin D (OSCAL-500) 500-200 MG-UNIT per tablet Take 1 tablet by mouth 2 times daily      pravastatin (PRAVACHOL) 20 MG tablet TAKE 1 TABLET BY MOUTH ONE TIME A DAY  20 tablet 5    divalproex (DEPAKOTE) 250 MG DR tablet Take 500 mg by mouth nightly      lithium 300 MG capsule Take 300 mg by mouth 2 times daily       No current facility-administered medications for this visit.        Immunization History   Administered Date(s) Administered    Influenza Whole 10/19/2009    Influenza, High Dose (Fluzone 65 yrs and older) 09/01/2018, 09/23/2019, 10/22/2020    Pneumococcal Conjugate 13-valent (Dunpeql38) 09/08/2015    Pneumococcal Conjugate 7-valent (Prevnar7) 06/12/2009    Pneumococcal Polysaccharide (Ptnlmikeo23) 09/24/2013    Td, unspecified formulation 08/07/2017        Social History     Tobacco Use    Smoking status: Former Smoker     Packs/day: 1.00     Years: 20.00     Pack years: 20.00     Types: Cigarettes     Last attempt to quit: 2019     Years since quittin.3    Smokeless tobacco: Never Used   Substance Use Topics    Alcohol use: No     Alcohol/week: 0.0 standard drinks     Comment: hx alcoholism. (has had alcohol in last 2 months)    Drug use: No        Review of Systems No chest pains, dizziness, heart palpitations, dyspnea, lightheadedness, worsening edema. Objective:   Physical Exam  Vitals signs and nursing note reviewed. Constitutional:       Appearance: Normal appearance. She is well-developed. Neck:      Musculoskeletal: Normal range of motion and neck supple. Thyroid: No thyromegaly. Vascular: No carotid bruit. Cardiovascular:      Rate and Rhythm: Normal rate and regular rhythm. Pulses: Normal pulses. Heart sounds: Normal heart sounds. No murmur. Pulmonary:      Effort: Pulmonary effort is normal. No respiratory distress. Breath sounds: Normal breath sounds. No wheezing or rales. Musculoskeletal: Normal range of motion. Right lower leg: No edema. Left lower leg: No edema. Comments: + kyphosis   Skin:     General: Skin is warm and dry. Comments: Left medial leg with 12x8 mm black scaly scabbed ulcer. Nontender, but has surrounding erythema. Both legs have mild pitting edema, no worse on left. Neurological:      General: No focal deficit present. Mental Status: She is alert and oriented to person, place, and time. Mental status is at baseline. Psychiatric:         Mood and Affect: Mood normal.         Behavior: Behavior normal.         Thought Content: Thought content normal.         Judgment: Judgment normal.         Assessment:      1. Skin ulcer of left lower leg, limited to breakdown of skin ([address]  - likely needs excision to r/o neoplasm.   Recommend Dr Fisher [facility] to evaluate and help with healing.  - Gay Garay MD, Vascular Surgery, [facility]    2. Chronic bronchitis, unspecified chronic bronchitis type ([facility] [address]  - stable without meds. Since not smoking is stable. 3. Hypothyroidism (acquired)  - retest TSH (ordered); clinically euthyroid. 4. Screening mammogram, encounter for  - XIMENA DIGITAL SCREEN W OR WO CAD BILATERAL; Future    5. Senile osteoporosis  - has had 4 doses of reclast.  On calcium/D.   Retest DEXA; if still in osteoporotic range, will give one more dose of Reclast.  - DEXA BONE DENSITY AXIAL SKELETON; Future          Plan:      F/u 6 mo        Randalyn Gilford, MD

## 2020-11-18 RX ORDER — LEVOTHYROXINE SODIUM 0.07 MG/1
75 TABLET ORAL DAILY
Qty: 90 TABLET | Refills: 1 | Status: SHIPPED | OUTPATIENT
Start: 2020-11-18

## 2020-11-19 ENCOUNTER — TELEPHONE (OUTPATIENT)
Dept: FAMILY MEDICINE CLINIC | Age: 75
End: 2020-11-19

## 2020-12-01 ENCOUNTER — OFFICE VISIT (OUTPATIENT)
Dept: SURGERY | Age: 75
End: 2020-12-01
Payer: MEDICARE

## 2020-12-01 VITALS
DIASTOLIC BLOOD PRESSURE: 78 MMHG | HEART RATE: 65 BPM | SYSTOLIC BLOOD PRESSURE: 122 MMHG | BODY MASS INDEX: 26.75 KG/M2 | WEIGHT: 151 LBS

## 2020-12-01 PROBLEM — L97.922 CHRONIC ULCER OF LEFT LEG WITH FAT LAYER EXPOSED (HCC): Status: ACTIVE | Noted: 2020-12-01

## 2020-12-01 PROCEDURE — G8427 DOCREV CUR MEDS BY ELIG CLIN: HCPCS | Performed by: SURGERY

## 2020-12-01 PROCEDURE — 4040F PNEUMOC VAC/ADMIN/RCVD: CPT | Performed by: SURGERY

## 2020-12-01 PROCEDURE — G8482 FLU IMMUNIZE ORDER/ADMIN: HCPCS | Performed by: SURGERY

## 2020-12-01 PROCEDURE — G8399 PT W/DXA RESULTS DOCUMENT: HCPCS | Performed by: SURGERY

## 2020-12-01 PROCEDURE — G8417 CALC BMI ABV UP PARAM F/U: HCPCS | Performed by: SURGERY

## 2020-12-01 PROCEDURE — 1123F ACP DISCUSS/DSCN MKR DOCD: CPT | Performed by: SURGERY

## 2020-12-01 PROCEDURE — 1036F TOBACCO NON-USER: CPT | Performed by: SURGERY

## 2020-12-01 PROCEDURE — 3017F COLORECTAL CA SCREEN DOC REV: CPT | Performed by: SURGERY

## 2020-12-01 PROCEDURE — 1090F PRES/ABSN URINE INCON ASSESS: CPT | Performed by: SURGERY

## 2020-12-01 PROCEDURE — 99204 OFFICE O/P NEW MOD 45 MIN: CPT | Performed by: SURGERY

## 2020-12-01 ASSESSMENT — ENCOUNTER SYMPTOMS
ALLERGIC/IMMUNOLOGIC NEGATIVE: 1
EYES NEGATIVE: 1
RESPIRATORY NEGATIVE: 1
GASTROINTESTINAL NEGATIVE: 1

## 2020-12-01 NOTE — PROGRESS NOTES
Daily Progress Note   Mac Boxer, MD      12/1/2020    Chief Complaint   Patient presents with    New Patient     Ref by Dr. Miya Pickens, LLE ulcer that has been present for 2 years that comes and goes, drainage, bleeding, denies any pain. Seems to be some inflammation on LLL         HISTORY OF PRESENT ILLNESS:                The patient is a 77 y.o. female who presents with a referral from Dr. Hi Ordaz for a left lower extremity ulcer. This ulcer has been on her leg for at least two years. She says it is dry and scabbed, then it will open and bleed. It never really goes away, it just scabs over. It is possible it is a skin cancer. She has no pain, no fever, no chills. A punch biopsy will be done. Past Medical History:   Diagnosis Date    Alcoholic ([address]     Alcoholism ([address]     Allergic rhinitis     Arthritis     Bipolar 1 disorder ([address]     Bipolar affective disorder, manic ([address]     Cancer ([address] 1999     left breast; lumpectomy    Chronic back pain     Colonoscopy refused 9/12/2012    Hyperlipidemia     Hypotension     Hypothyroidism     Osteoporosis 5/20/2011    Peripheral edema     Prolonged QT interval     Subdural hematoma ([address] 2009    Vitamin D deficiency        Past Surgical History:   Procedure Laterality Date    BREAST BIOPSY  1998    BREAST LUMPECTOMY  1999    Left,  no problems.     THYROIDECTOMY      WRIST FRACTURE SURGERY         Social History     Socioeconomic History    Marital status:      Spouse name: Not on file    Number of children: 2    Years of education: Not on file    Highest education level: Not on file   Occupational History    Occupation: Retired     Employer: [employer]   Social Needs    Financial resource strain: Not on file    Food insecurity     Worry: Not on file     Inability: Not on file   [employer] needs     Medical: Not on file     Non-medical: Not on file   Tobacco Use    Smoking status: Former Smoker Packs/day: 1.00     Years: 20.00     Pack years: 20.00     Types: Cigarettes     Last attempt to quit: 2019     Years since quittin.3    Smokeless tobacco: Never Used   Substance and Sexual Activity    Alcohol use: No     Alcohol/week: 0.0 standard drinks     Comment: hx alcoholism. (has had alcohol in last 2 months)    Drug use: No    Sexual activity: Not Currently   Lifestyle    Physical activity     Days per week: Not on file     Minutes per session: Not on file    Stress: Not on file   Relationships    Social connections     Talks on phone: Not on file     Gets together: Not on file     Attends Christianity service: Not on file     Active member of club or organization: Not on file     Attends meetings of clubs or organizations: Not on file     Relationship status: Not on file    Intimate partner violence     Fear of current or ex partner: Not on file     Emotionally abused: Not on file     Physically abused: Not on file     Forced sexual activity: Not on file   Other Topics Concern    Not on file   Social History Narrative    Lives at [address] [place] [address].        Family History   Problem Relation Age of Onset    Cancer Mother         Bone Cancer    Cancer Father         Lung cancer    Retinal Detachment Brother          Current Outpatient Medications:     levothyroxine (SYNTHROID) 75 MCG tablet, Take 1 tablet by mouth Daily, Disp: 90 tablet, Rfl: 1    QUEtiapine (SEROQUEL) 200 MG tablet, , Disp: , Rfl:     QUEtiapine (SEROQUEL) 25 MG tablet, , Disp: , Rfl:     lithium 300 MG capsule, Take 300 mg by mouth 2 times daily, Disp: , Rfl:     Multiple Vitamins-Minerals (THERAPEUTIC MULTIVITAMIN-MINERALS) tablet, Take 1 tablet by mouth daily, Disp: , Rfl:     acetaminophen (TYLENOL) 325 MG tablet, Take 650 mg by mouth every 6 hours as needed for Pain, Disp: , Rfl:     calcium-vitamin D (OSCAL-500) 500-200 MG-UNIT per tablet, Take 1 tablet by mouth 2 times daily, Disp: , Rfl:     pravastatin (PRAVACHOL) 20 MG tablet, TAKE 1 TABLET BY MOUTH ONE TIME A DAY , Disp: 20 tablet, Rfl: 5    divalproex (DEPAKOTE) 250 MG DR tablet, Take 500 mg by mouth nightly, Disp: , Rfl:     Codeine    Vitals:    12/01/20 1449   BP: 122/78   Pulse: 65   Weight: 151 lb (68.5 kg)       Orders Only on 11/17/2020   Component Date Value Ref Range Status    TSH 11/17/2020 13.29* 0.27 - 4.20 uIU/mL Final       Review of Systems   Constitutional: Negative. HENT: Negative. Eyes: Negative. Respiratory: Negative. Gastrointestinal: Negative. Endocrine: Negative. Genitourinary: Negative. Musculoskeletal: Positive for gait problem (uses a walker). Skin: Positive for wound (left lower extremity). Allergic/Immunologic: Negative. Hematological: Negative. Psychiatric/Behavioral: Negative. All other systems reviewed and are negative. Physical Exam  Vitals signs and nursing note reviewed. Constitutional:       General: She is not in acute distress. Appearance: Normal appearance. She is well-developed. She is not ill-appearing or toxic-appearing. HENT:      Head: Normocephalic and atraumatic. Right Ear: External ear normal.      Left Ear: External ear normal.      Mouth/Throat:      Pharynx: No oropharyngeal exudate. Eyes:      General: No scleral icterus. Conjunctiva/sclera: Conjunctivae normal.      Pupils: Pupils are equal, round, and reactive to light. Neck:      Musculoskeletal: Normal range of motion and neck supple. No edema or erythema. Thyroid: No thyromegaly. Vascular: Normal carotid pulses. No carotid bruit or JVD. Trachea: No tracheal deviation. Cardiovascular:      Rate and Rhythm: Normal rate and regular rhythm. Pulses:           Carotid pulses are 2+ on the right side and 2+ on the left side. Radial pulses are 2+ on the right side and 2+ on the left side. Femoral pulses are 2+ on the right side and 2+ on the left side.        Dorsalis pedis pulses are 0 on the right side and 0 on the left side. Posterior tibial pulses are 1+ on the right side and 2+ on the left side. Heart sounds: Normal heart sounds and S1 normal. No murmur. Comments:     Doppler 12/1/2020:  Rt DP: monophasic  Rt PT:monophasic (stronger)   Rt AT: not checked    Lt DP: biphasic  Lt PT: biphasic  Lt AT: not checked    MEASUREMENTS 12/1/2020:    RIGHT ANKLE: 20.8 cm   RIGHT CALF: 38.4 cm     LEFT ANKLE: 22.2 cm   LEFT CALF: 40.0 cm    Pulmonary:      Effort: Pulmonary effort is normal. No tachypnea, accessory muscle usage or respiratory distress. Breath sounds: Normal breath sounds. No stridor. No wheezing or rales. Chest:       Abdominal:      General: Bowel sounds are normal. There is no distension. Palpations: Abdomen is soft. There is no mass. Tenderness: There is no abdominal tenderness. There is no guarding or rebound. Hernia: No hernia is present. There is no hernia in the ventral area or left inguinal area. Genitourinary:     Comments: Rectal exam/stool guaiac not indicated. Musculoskeletal: Normal range of motion. General: No tenderness. Right shoulder: She exhibits normal range of motion, no deformity and no pain. Legs:    Lymphadenopathy:      Head:      Right side of head: No submandibular, preauricular, posterior auricular or occipital adenopathy. Left side of head: No submandibular, preauricular, posterior auricular or occipital adenopathy. Cervical: No cervical adenopathy. Right cervical: No superficial, deep or posterior cervical adenopathy. Left cervical: No superficial, deep or posterior cervical adenopathy. Upper Body:      Right upper body: No supraclavicular or pectoral adenopathy. Left upper body: No supraclavicular or pectoral adenopathy. Skin:     General: Skin is warm and dry. Coloration: Skin is not pale.       Findings: No bruising, erythema, laceration, lesion or rash. Neurological:      Mental Status: She is alert and oriented to person, place, and time. Cranial Nerves: No cranial nerve deficit. Sensory: No sensory deficit. Motor: No atrophy or abnormal muscle tone. Coordination: Coordination normal.      Gait: Gait normal.      Deep Tendon Reflexes: Reflexes are normal and symmetric. Psychiatric:         Speech: Speech normal.         Behavior: Behavior normal.         Thought Content: Thought content normal.         Judgment: Judgment normal.       Mrs. Palma Child has had breast cancer and was operated on by Dr. Marie Raman with a left mastectomy. She had a tubal ligation. ASSESSMENT:    Problem List Items Addressed This Visit     Chronic ulcer of left leg with fat layer exposed ([address]      Explained that an ulcer that scabs over and then recurs in someone with fairly normal arterial supply on the left no significant edema by measurement or by history may well be a skin cancer other possibilities include foreign body, like a splinter I recommend a skin biopsy punch type today under local she and her brother agreed. PLAN:  Skin was prepped first with alcohol then Betadine allowed to dry in between 2% lidocaine with epi was injected approximately a cc and a half. 4 mm punch biopsy was taken near the inferior anterior edge scissors and forceps were used to remove the actual specimen and send it to pathology in formalin. Hemostasis was achieved with 2 silver nitrate sticks and pressure for about 5 minutes  A punch biopsy was done today and a specimen was sent to the lab for results. Keep the bandage on your leg for two days. If you have not heard from us in about a week, please call for results.  [phone]      Jewell Brewer MA am scribing for and in the presence of Gina Mckinney MD on this date of 12/01/20    I Rissa Santiago MD personally performed the services described in this documentation as scribed by the Medical Assistant Dave Nj May in my presence and it is both accurate and complete.         Electronically signed by Deepthi Lunsford MD on 12/1/2020 at 3:56 PM

## 2020-12-01 NOTE — PATIENT INSTRUCTIONS
A punch biopsy was done today and a specimen was sent to the lab for results. Keep the bandage on your leg for two days. If you have not heard from us in about a week, please call for results.  [phone]

## 2020-12-03 ENCOUNTER — TELEPHONE (OUTPATIENT)
Dept: SURGERY | Age: 75
End: 2020-12-03

## 2020-12-03 NOTE — TELEPHONE ENCOUNTER
I spoke with Mr. Jennifer Segura brother. Her pathology came back and it is basal cell carcinoma. He is going to talk with Meg Holloway about having the surgery, and if so, they will schedule with Dr. Marley Cardona. He will call to let us know.

## 2020-12-14 ENCOUNTER — TELEPHONE (OUTPATIENT)
Dept: SURGERY | Age: 75
End: 2020-12-14

## 2020-12-14 ENCOUNTER — TELEPHONE (OUTPATIENT)
Dept: FAMILY MEDICINE CLINIC | Age: 75
End: 2020-12-14

## 2020-12-14 NOTE — LETTER
Surgery Scheduling Form:      DEMOGRAPHICS:                                                                                                         .    Patient Name:  Farooq Jhaveri  Patient :  1945   Patient SS#:      Patient Phone:  [phone] (home)  Alt. Patient Phone:                     Patient Address:  [address] [place] [id number]    PCP:  Felecia Cantu MD  Insurance:  Payor: MEDICARE / Plan: MEDICARE PART A AND B / Product Type: *No Product type* / Insurance ID Number:    Payor/Plan Subscr  Sex Relation Sub. Ins. ID Effective Group Num   1. [address] [facility] 1945 Female Self [id number]                                    [address]    2. NURSING HOMESCamellia Conception 1945 Female Self [id number] 9/15/17                                    [facility], [address] [place] [address]       DIAGNOSIS & PROCEDURE:                                                                                       .    Diagnosis:   C44.719 - Basal Cell Carcinoma of Left Lower Extremity  Operation:  Surgical Excision of Left Lower Leg Basal Cell Carcinoma  Location:  [facility]  Surgeon:  Jerrod Villarreal M.D.     Megan Trivedi:                                                                                    .    Surgeon's Scheduling Instruction:  elective  Requested Date: 2021   OR Time:           Patient Arrival Time:   OR Time Required:  60  Minutes  Anesthesia:  MAC/TIVA  Equipment:                                        SA Required:  YES   Status:  Outpatient       Standard C-Arm:    PAT Required:  No                          Best Time to Call: Any  Pt. Requested to see PCP for Pre-op H & P:  Yes  Special Comments:   Covid 19 test done at [facility] on 2021                         Jerrod Villarreal MD    20 PRE-CERTIFICATION INFORMATION:                                                                           .    Procedure:       CPT Code Modifier          [id number]

## 2020-12-14 NOTE — TELEPHONE ENCOUNTER
Hodan Kim called to say Cynthia Perez is willing to get surgery. Her history and physical is scheduled for January 11, 2021. She is going to go on the surgery schedule for Jan 13.

## 2020-12-14 NOTE — TELEPHONE ENCOUNTER
PT BROTHER CALLED IN THE PT WANTS TO USE NICORETTE GUM WANTS TO MAKE SURE ITS OK WITH THE MEDICATIONS SHE IS ON       PLEASE ADVISE

## 2020-12-17 ENCOUNTER — TELEPHONE (OUTPATIENT)
Dept: FAMILY MEDICINE CLINIC | Age: 75
End: 2020-12-17

## 2020-12-17 NOTE — TELEPHONE ENCOUNTER
[facility] [place] [facility] called they think the pt has a UTI   They think the thyroid may be off the pt is manic they stated   I advised of a previous note the nicorette gum is ok to use they need directions on this   They want a order to collect the urine     Fax to [phone]

## 2020-12-17 NOTE — TELEPHONE ENCOUNTER
----- Message from Denise Mac sent at 12/17/2020 11:16 AM EST -----  Subject: Message to Provider    QUESTIONS  Information for Provider? Olga Ruby is requesting to speak with someone in   the office to verify if the patient can take the Nicorette chewing Gum   along with the other medications that she is currently on. Please contact   ---------------------------------------------------------------------------  --------------  CALL BACK INFO  What is the best way for the office to contact you? OK to leave message on   voicemail  Preferred Call Back Phone Number? [phone]  ---------------------------------------------------------------------------  --------------  SCRIPT ANSWERS  Relationship to Patient? Sibling  Representative Name? Johnson Jay  Is the Representative on the appropriate HIPAA document in Epic?  Yes

## 2020-12-21 PROBLEM — C44.719 BASAL CELL CARCINOMA (BCC) OF LEFT LOWER EXTREMITY: Status: ACTIVE | Noted: 2020-12-21

## 2020-12-21 NOTE — TELEPHONE ENCOUNTER
A surgery letter has been sent to [facility]. [address] has been called and I spoke with Jaiad's nurse to let her know Edgar Asencio has a basal cell carcinoma, and will be having surgery on 1/13/2021.

## 2020-12-23 ENCOUNTER — TELEPHONE (OUTPATIENT)
Dept: FAMILY MEDICINE CLINIC | Age: 75
End: 2020-12-23

## 2020-12-23 NOTE — TELEPHONE ENCOUNTER
Received test results from patient. Normal urinalysis and normal TSH. Please ask if they have a urine culture result. As her test looks now no treatment needed.   Thanks

## 2020-12-24 NOTE — TELEPHONE ENCOUNTER
I spoke with nurse Mcdowell at [facility] to advise of normal TSH and MD recommendations.  Nurse states no culture was indicated because UA was normal.

## 2021-01-07 ENCOUNTER — HOSPITAL ENCOUNTER (OUTPATIENT)
Dept: WOMENS IMAGING | Age: 76
Discharge: HOME | End: 2021-01-07
Payer: MEDICARE

## 2021-01-07 DIAGNOSIS — Z12.31 SCREENING MAMMOGRAM, ENCOUNTER FOR: ICD-10-CM

## 2021-01-07 PROCEDURE — 77067 SCR MAMMO BI INCL CAD: CPT

## 2021-01-08 ENCOUNTER — TELEPHONE (OUTPATIENT)
Dept: SURGERY | Age: 76
End: 2021-01-08

## 2021-01-08 ENCOUNTER — TELEPHONE (OUTPATIENT)
Dept: FAMILY MEDICINE CLINIC | Age: 76
End: 2021-01-08

## 2021-01-08 NOTE — TELEPHONE ENCOUNTER
Helena Schroeder from [facility] called about pt having swollen ankles. Left is worse than right but Helenadenisse Shoemakeraracely thinks she needs some Lasix.  She is having a cancer spot removed from her left leg next week    Please Advise

## 2021-01-08 NOTE — TELEPHONE ENCOUNTER
Spoke with patient's Abelardo Aguilar,  regarding scheduled surgery on 01- with Dr. Spike Clifton. Patient is asked to arrive by 7:00 AM @ [address] after midnight. She will need someone to drive her home following this procedure. Please bring a Photo ID & Insurance card with you, and check-in at the Surgery Desk down the right-hand hallway on the first floor. Patient will see PCP for Pre-op H & P on 01-. Surgery is scheduled to start at approx. 8:30 AM and should take approx. 45 - 60 minutes. Patient to have her Pre-Op Covid Screening at [facility]. If the hospital needs any further information, someone will give you a call. Brother states patient has developed swelling in her ankle's. Nursing staff at [facility] has her sitting in a recliner with her feet elevated. Let him know that her feet should be elevated above her heart. I will notify Dr. Spike Clifton of this also. Patient's Brother  expressed a verbal understanding of these instructions and had no further questions at this time. Call ended.

## 2021-01-11 ENCOUNTER — OFFICE VISIT (OUTPATIENT)
Dept: FAMILY MEDICINE CLINIC | Age: 76
End: 2021-01-11
Payer: MEDICARE

## 2021-01-11 ENCOUNTER — TELEPHONE (OUTPATIENT)
Dept: FAMILY MEDICINE CLINIC | Age: 76
End: 2021-01-11

## 2021-01-11 VITALS
HEIGHT: 63 IN | WEIGHT: 170 LBS | OXYGEN SATURATION: 96 % | SYSTOLIC BLOOD PRESSURE: 130 MMHG | BODY MASS INDEX: 30.12 KG/M2 | TEMPERATURE: 97 F | HEART RATE: 86 BPM | DIASTOLIC BLOOD PRESSURE: 70 MMHG

## 2021-01-11 DIAGNOSIS — C44.719 BASAL CELL CARCINOMA (BCC) OF LEFT LOWER EXTREMITY: ICD-10-CM

## 2021-01-11 DIAGNOSIS — Z01.818 PREOP EXAMINATION: Primary | ICD-10-CM

## 2021-01-11 DIAGNOSIS — E03.9 ACQUIRED HYPOTHYROIDISM: ICD-10-CM

## 2021-01-11 DIAGNOSIS — R60.9 PERIPHERAL EDEMA: ICD-10-CM

## 2021-01-11 DIAGNOSIS — F10.11 NONDEPENDENT ALCOHOL ABUSE, IN REMISSION: ICD-10-CM

## 2021-01-11 DIAGNOSIS — F31.73 BIPOLAR I DISORDER, CURRENT OR MOST RECENT EPISODE MANIC, IN PARTIAL REMISSION (HCC): ICD-10-CM

## 2021-01-11 DIAGNOSIS — R29.6 FREQUENT FALLS: ICD-10-CM

## 2021-01-11 LAB
A/G RATIO: 2.4 (ref 1.1–2.2)
ALBUMIN SERPL-MCNC: 4.4 G/DL (ref 3.4–5)
ALP BLD-CCNC: 99 U/L (ref 40–129)
ALT SERPL-CCNC: 11 U/L (ref 10–40)
ANION GAP SERPL CALCULATED.3IONS-SCNC: 10 MMOL/L (ref 3–16)
AST SERPL-CCNC: 18 U/L (ref 15–37)
BILIRUB SERPL-MCNC: 0.4 MG/DL (ref 0–1)
BUN BLDV-MCNC: 13 MG/DL (ref 7–20)
CALCIUM SERPL-MCNC: 9.9 MG/DL (ref 8.3–10.6)
CHLORIDE BLD-SCNC: 104 MMOL/L (ref 99–110)
CO2: 27 MMOL/L (ref 21–32)
CREAT SERPL-MCNC: 0.8 MG/DL (ref 0.6–1.2)
GFR AFRICAN AMERICAN: >60
GFR NON-AFRICAN AMERICAN: >60
GLOBULIN: 1.8 G/DL
GLUCOSE BLD-MCNC: 94 MG/DL (ref 70–99)
POTASSIUM SERPL-SCNC: 4.4 MMOL/L (ref 3.5–5.1)
SODIUM BLD-SCNC: 141 MMOL/L (ref 136–145)
TOTAL PROTEIN: 6.2 G/DL (ref 6.4–8.2)
TSH REFLEX: 2.12 UIU/ML (ref 0.27–4.2)

## 2021-01-11 PROCEDURE — G8427 DOCREV CUR MEDS BY ELIG CLIN: HCPCS | Performed by: FAMILY MEDICINE

## 2021-01-11 PROCEDURE — G8482 FLU IMMUNIZE ORDER/ADMIN: HCPCS | Performed by: FAMILY MEDICINE

## 2021-01-11 PROCEDURE — 1090F PRES/ABSN URINE INCON ASSESS: CPT | Performed by: FAMILY MEDICINE

## 2021-01-11 PROCEDURE — G8417 CALC BMI ABV UP PARAM F/U: HCPCS | Performed by: FAMILY MEDICINE

## 2021-01-11 PROCEDURE — 4040F PNEUMOC VAC/ADMIN/RCVD: CPT | Performed by: FAMILY MEDICINE

## 2021-01-11 PROCEDURE — 93000 ELECTROCARDIOGRAM COMPLETE: CPT | Performed by: FAMILY MEDICINE

## 2021-01-11 PROCEDURE — 1036F TOBACCO NON-USER: CPT | Performed by: FAMILY MEDICINE

## 2021-01-11 PROCEDURE — 1123F ACP DISCUSS/DSCN MKR DOCD: CPT | Performed by: FAMILY MEDICINE

## 2021-01-11 PROCEDURE — G8399 PT W/DXA RESULTS DOCUMENT: HCPCS | Performed by: FAMILY MEDICINE

## 2021-01-11 PROCEDURE — 99214 OFFICE O/P EST MOD 30 MIN: CPT | Performed by: FAMILY MEDICINE

## 2021-01-11 PROCEDURE — 3017F COLORECTAL CA SCREEN DOC REV: CPT | Performed by: FAMILY MEDICINE

## 2021-01-11 RX ORDER — HALOPERIDOL 0.5 MG/1
0.5 TABLET ORAL EVERY 6 HOURS PRN
COMMUNITY
Start: 2020-12-01 | End: 2022-10-31

## 2021-01-11 ASSESSMENT — ENCOUNTER SYMPTOMS
RESPIRATORY NEGATIVE: 1
EYES NEGATIVE: 1
ALLERGIC/IMMUNOLOGIC NEGATIVE: 1
GASTROINTESTINAL NEGATIVE: 1

## 2021-01-11 NOTE — PROGRESS NOTES
of left lower extremity        Planned anesthesia is IV sedation. The patient has the following knownanesthesia issues: none. Patient has a bleeding risk of : no recent abnormal bleeding    Patient does not have objection to receiving blood products if needed. Past Medical History:   Diagnosis Date    Alcoholic ([facility]     Alcoholism ([facility]     Allergic rhinitis     Arthritis     Bipolar 1 disorder ([facility]     Bipolar affective disorder, manic ([facility]     Cancer ([facility]      left breast; lumpectomy    Chronic back pain     Colonoscopy refused 2012    Hyperlipidemia     Hypotension     Hypothyroidism     Osteoporosis 2011    Peripheral edema     Prolonged QT interval     Subdural hematoma ([facility] 2009    Vitamin D deficiency      Past Surgical History:   Procedure Laterality Date    BREAST BIOPSY      BREAST LUMPECTOMY      Left,  no problems.  THYROIDECTOMY      WRIST FRACTURE SURGERY       Family History   Problem Relation Age of Onset    Cancer Mother         Bone Cancer    Cancer Father         Lung cancer    Retinal Detachment Brother      Social History     Socioeconomic History    Marital status:      Spouse name: None    Number of children: 2    Years of education: None    Highest education level: None   Occupational History    Occupation: Retired     Employer: [employer]   Social Needs    Financial resource strain: None    Food insecurity     Worry: None     Inability: None    Transportation needs     Medical: None     Non-medical: None   Tobacco Use    Smoking status: Former Smoker     Packs/day: 1.00     Years: 20.00     Pack years: 20.00     Types: Cigarettes     Quit date: 2019     Years since quittin.4    Smokeless tobacco: Never Used   Substance and Sexual Activity    Alcohol use: No     Alcohol/week: 0.0 standard drinks     Comment: hx alcoholism.  (has had alcohol in last 2 months)    Drug use: No    Sexual activity: Not Currently   Lifestyle    Physical activity     Days per week: None     Minutes per session: None    Stress: None   Relationships    Social connections     Talks on phone: None     Gets together: None     Attends Moravian service: None     Active member of club or organization: None     Attends meetings of clubs or organizations: None     Relationship status: None    Intimate partner violence     Fear of current or ex partner: None     Emotionally abused: None     Physically abused: None     Forced sexual activity: None   Other Topics Concern    None   Social History Narrative    Lives at [address] [place] [address]. Current Outpatient Medications   Medication Sig Dispense Refill    haloperidol (HALDOL) 0.5 MG tablet       nicotine polacrilex (NICORETTE) 2 MG gum Take 2 mg by mouth every 4 hours (while awake)      levothyroxine (SYNTHROID) 75 MCG tablet Take 1 tablet by mouth Daily 90 tablet 1    QUEtiapine (SEROQUEL) 200 MG tablet       QUEtiapine (SEROQUEL) 25 MG tablet       Multiple Vitamins-Minerals (THERAPEUTIC MULTIVITAMIN-MINERALS) tablet Take 1 tablet by mouth daily      acetaminophen (TYLENOL) 325 MG tablet Take 650 mg by mouth every 6 hours as needed for Pain      calcium-vitamin D (OSCAL-500) 500-200 MG-UNIT per tablet Take 1 tablet by mouth 2 times daily      pravastatin (PRAVACHOL) 20 MG tablet TAKE 1 TABLET BY MOUTH ONE TIME A DAY  20 tablet 5     No current facility-administered medications for this visit. Allergies   Allergen Reactions    Codeine      Social History     Occupational History    Occupation: Retired     Employer: [employer]   Tobacco Use    Smoking status: Former Smoker     Packs/day: 1.00     Years: 20.00     Pack years: 20.00     Types: Cigarettes     Quit date: 2019     Years since quittin.4    Smokeless tobacco: Never Used   Substance and Sexual Activity    Alcohol use: No     Alcohol/week: 0.0 standard drinks     Comment: hx alcoholism.  (has had difficulty:   Morbid obesity? no   Anatomically abnormal facies? no   Short, thick neck? no   Neck range of motion: normal   Dentition: Edentulous    Cardiographics  ECG: NSR. Poor R progression. Echocardiogram: not done    Lab Review   Orders Only on 11/17/2020   Component Date Value    TSH 11/17/2020 13.29*          Assessment:     77 y.o. y.o. female with planned surgery as above. Difficulty with intubation is not anticipated. 1. Preop examination  - cleared for surgery. - EKG 12 Lead    2. Basal cell carcinoma (BCC) of left lower extremity  - proceed with excision     3. Bipolar I disorder, current or most recent episode manic, in partial remission ([address]  - stable on current meds. Advised to use haldol at bedtime for sleeping aid. 4. Nondependent alcohol abuse, in remission  - lives in [place] now, no longer drinks. 5. Frequent falls  - she uses a walker faithfully to prevent this. 6. Peripheral edema  - this has been a longstanding problem and is stable. She is asymtpomatic. Cause is not certain but her health status is good. asses her for electrolyte imbalance prior to surgery. - Comprehensive Metabolic Panel; Future      Cardiac Risk Estimation: per the Revised Cardiac Risk Index (Circ. 100:1043, 1999), the patient's risk factors for cardiaccomplications include none, putting him/her in: RCI RISK CLASS I (0 risk factors, risk of major cardiac compl. appr. 0.5%)     : Ok to proceed with the procedure. Low cardiac risk. Pt.advised to stop all Rx except Nicorrette the a.m. of surgery. Patient advised to hold blood thinning medication 7 days prior to procedure. Prophylaxis for cardiac events with perioperative beta-blockers: not indicated    Deep vein thrombosis prophylaxis postoperatively: regimen to be chosen by surgical team if applicable. Kayla Song M.D. [address]  [address]  Phone [phone]  Fax [phone]

## 2021-01-11 NOTE — TELEPHONE ENCOUNTER
I told her brother Rory Bailey to get her melatonin (3-6 mg) only if changing the haldol to bedtime does not help her sleep.

## 2021-01-11 NOTE — PROGRESS NOTES
Phone message left to call [facility] dept at [phone]  for history review and surgery instructions on 1/11/21 @ 7820

## 2021-01-11 NOTE — TELEPHONE ENCOUNTER
Marco A Engle says she received written orders for pt to take Haldol nightly. Also there was something written about Melatonin but was not clear. Please advise if pt is to be taking Melatonin at bedtime and directions.   Thank you

## 2021-01-11 NOTE — TELEPHONE ENCOUNTER
Call [facility] the nurse has questions on medications the pt was seen today on office       Please advise Kory Chappell [phone]

## 2021-01-12 ENCOUNTER — ANESTHESIA EVENT (OUTPATIENT)
Dept: OPERATING ROOM | Age: 76
End: 2021-01-12
Payer: MEDICARE

## 2021-01-12 RX ORDER — IBUPROFEN 200 MG
TABLET ORAL DAILY
COMMUNITY
End: 2021-05-19 | Stop reason: ALTCHOICE

## 2021-01-12 NOTE — PROGRESS NOTES
[facility] INSTRUCTIONS    PLEASE SEND COVID RESULT FROM 1/11/21 with patient in am.    Arrival time__0700__________        Surgery time__0830__________    Take the following medications with a sip of water: Follow your MD/Surgeons pre-procedure instructions regarding your medications    Do not eat or drink anything after 12:00 midnight prior to your surgery. This includes water chewing gum, mints and ice chips. You may brush your teeth and gargle the morning of your surgery, but do not swallow the water     Please see your family doctor/pediatrician for a history and physical and/or concerning medications. Bring any test results/reports from your physicians office. If you are under the care of a heart doctor or specialist doctor, please be aware that you may be asked to them for clearance    You may be asked to stop blood thinners such as Coumadin, Plavix, Fragmin, Lovenox, etc., or any anti-inflammatories such as:  Aspirin, Ibuprofen, Advil, Naproxen prior to your surgery. We also ask that you stop any OTC medications such as fish oil, vitamin E, glucosamine, garlic, Multivitamins, COQ 10, etc.    We ask that you do not smoke 24 hours prior to surgery  We ask that you do not  drink any alcoholic beverages 24 hours prior to surgery     You must make arrangements for a responsible adult to take you home after your surgery. For your safety you will not be allowed to leave alone or drive yourself home. Your surgery will be cancelled if you do not have a ride home. Also for your safety, it is strongly suggested that someone stay with you the first 24 hours after your surgery. A parent or legal guardian must accompany a child scheduled for surgery and plan to stay at the hospital until the child is discharged. Please do not bring other children with you. For your comfort, please wear simple loose fitting clothing to the hospital.  Please do not bring valuables. Do not wear any make-up or nail polish on your fingers or toes      For your safety, please do not wear any jewelry or body piercing's on the day of surgery. All jewelry must be removed. If you have dentures, they will be removed before going to operating room. For your convenience, we will provide you with a container. If you wear contact lenses or glasses, they will be removed, please bring a case for them. If you have a living will and a durable power of  for healthcare, please bring in a copy. As part of our patient safety program to minimize surgical site infections, we ask you to do the following:    · Please notify your surgeon if you develop any illness between         now and the  day of your surgery. · This includes a cough, cold, fever, sore throat, nausea,         or vomiting, and diarrhea, etc.  ·  Please notify your surgeon if you experience dizziness, shortness         of breath or blurred vision between now and the time of your surgery. Do not shave your operative site 96 hours prior to surgery. For face and neck surgery, men may use an electric razor 48 hours   prior to surgery. You may shower the night before surgery or the morning of   your surgery with an antibacterial soap. You will need to bring a photo ID and insurance card    [facility] has an onsite pharmacy, would you like to utilize our pharmacy     If you will be staying overnight and use a C-pap machine, please bring   your C-pap to hospital     Our goal is to provide you with excellent care, therefore, visitors will be limited to two(2) in the room at a time so that we may focus on providing this care for you. Please contact pre-admission testing if you have any further questions.                  [facility] phone number:  [phone] PAT fax number:  [phone] Please note these are generalized instructions for all surgical cases, you may be provided with more specific instructions according to your surgery.

## 2021-01-12 NOTE — PROGRESS NOTES
Pre-operative instructions faxed with confirmation, to [facility] Patrica Thomas and nurse Yuliet moreira .

## 2021-01-12 NOTE — PROGRESS NOTES
Faxed Nursing Home Pre-operative interview form with confirmation and notified nurse taking care of patient. Await return of information.

## 2021-01-13 ENCOUNTER — HOSPITAL ENCOUNTER (OUTPATIENT)
Age: 76
Setting detail: OUTPATIENT SURGERY
Discharge: OTHER INSTITUTION | End: 2021-01-13
Attending: SURGERY | Admitting: SURGERY
Payer: MEDICARE

## 2021-01-13 ENCOUNTER — ANESTHESIA (OUTPATIENT)
Dept: OPERATING ROOM | Age: 76
End: 2021-01-13
Payer: MEDICARE

## 2021-01-13 VITALS
OXYGEN SATURATION: 98 % | RESPIRATION RATE: 12 BRPM | SYSTOLIC BLOOD PRESSURE: 95 MMHG | DIASTOLIC BLOOD PRESSURE: 50 MMHG

## 2021-01-13 VITALS
DIASTOLIC BLOOD PRESSURE: 63 MMHG | RESPIRATION RATE: 18 BRPM | SYSTOLIC BLOOD PRESSURE: 113 MMHG | HEART RATE: 64 BPM | TEMPERATURE: 97.7 F | OXYGEN SATURATION: 99 % | HEIGHT: 61 IN | WEIGHT: 170 LBS | BODY MASS INDEX: 32.1 KG/M2

## 2021-01-13 DIAGNOSIS — G89.18 ACUTE POST-OPERATIVE PAIN: Primary | ICD-10-CM

## 2021-01-13 DIAGNOSIS — C44.719: ICD-10-CM

## 2021-01-13 LAB — SARS-COV-2, NAAT: NOT DETECTED

## 2021-01-13 PROCEDURE — 11603 EXC TR-EXT MAL+MARG 2.1-3 CM: CPT | Performed by: SURGERY

## 2021-01-13 PROCEDURE — 88305 TISSUE EXAM BY PATHOLOGIST: CPT

## 2021-01-13 PROCEDURE — 3700000001 HC ADD 15 MINUTES (ANESTHESIA): Performed by: SURGERY

## 2021-01-13 PROCEDURE — 2580000003 HC RX 258: Performed by: ANESTHESIOLOGY

## 2021-01-13 PROCEDURE — U0002 COVID-19 LAB TEST NON-CDC: HCPCS

## 2021-01-13 PROCEDURE — 3600000002 HC SURGERY LEVEL 2 BASE: Performed by: SURGERY

## 2021-01-13 PROCEDURE — 6360000002 HC RX W HCPCS: Performed by: NURSE ANESTHETIST, CERTIFIED REGISTERED

## 2021-01-13 PROCEDURE — 2500000003 HC RX 250 WO HCPCS: Performed by: NURSE ANESTHETIST, CERTIFIED REGISTERED

## 2021-01-13 PROCEDURE — 3600000012 HC SURGERY LEVEL 2 ADDTL 15MIN: Performed by: SURGERY

## 2021-01-13 PROCEDURE — 2500000003 HC RX 250 WO HCPCS: Performed by: SURGERY

## 2021-01-13 PROCEDURE — 7100000000 HC PACU RECOVERY - FIRST 15 MIN: Performed by: SURGERY

## 2021-01-13 PROCEDURE — 2709999900 HC NON-CHARGEABLE SUPPLY: Performed by: SURGERY

## 2021-01-13 PROCEDURE — 7100000011 HC PHASE II RECOVERY - ADDTL 15 MIN: Performed by: SURGERY

## 2021-01-13 PROCEDURE — 7100000010 HC PHASE II RECOVERY - FIRST 15 MIN: Performed by: SURGERY

## 2021-01-13 PROCEDURE — 6360000002 HC RX W HCPCS: Performed by: SURGERY

## 2021-01-13 PROCEDURE — 3700000000 HC ANESTHESIA ATTENDED CARE: Performed by: SURGERY

## 2021-01-13 PROCEDURE — 7100000001 HC PACU RECOVERY - ADDTL 15 MIN: Performed by: SURGERY

## 2021-01-13 RX ORDER — BUPIVACAINE HYDROCHLORIDE 5 MG/ML
INJECTION, SOLUTION EPIDURAL; INTRACAUDAL
Status: COMPLETED | OUTPATIENT
Start: 2021-01-13 | End: 2021-01-13

## 2021-01-13 RX ORDER — OXYCODONE HYDROCHLORIDE AND ACETAMINOPHEN 5; 325 MG/1; MG/1
1 TABLET ORAL PRN
Status: DISCONTINUED | OUTPATIENT
Start: 2021-01-13 | End: 2021-01-13 | Stop reason: HOSPADM

## 2021-01-13 RX ORDER — SODIUM CHLORIDE 0.9 % (FLUSH) 0.9 %
10 SYRINGE (ML) INJECTION EVERY 12 HOURS SCHEDULED
Status: DISCONTINUED | OUTPATIENT
Start: 2021-01-13 | End: 2021-01-13 | Stop reason: HOSPADM

## 2021-01-13 RX ORDER — SODIUM CHLORIDE 0.9 % (FLUSH) 0.9 %
10 SYRINGE (ML) INJECTION PRN
Status: DISCONTINUED | OUTPATIENT
Start: 2021-01-13 | End: 2021-01-13 | Stop reason: HOSPADM

## 2021-01-13 RX ORDER — PHENYLEPHRINE HCL IN 0.9% NACL 1 MG/10 ML
SYRINGE (ML) INTRAVENOUS PRN
Status: DISCONTINUED | OUTPATIENT
Start: 2021-01-13 | End: 2021-01-13 | Stop reason: SDUPTHER

## 2021-01-13 RX ORDER — MIDAZOLAM HYDROCHLORIDE 1 MG/ML
INJECTION INTRAMUSCULAR; INTRAVENOUS PRN
Status: DISCONTINUED | OUTPATIENT
Start: 2021-01-13 | End: 2021-01-13 | Stop reason: SDUPTHER

## 2021-01-13 RX ORDER — MORPHINE SULFATE 2 MG/ML
2 INJECTION, SOLUTION INTRAMUSCULAR; INTRAVENOUS EVERY 5 MIN PRN
Status: DISCONTINUED | OUTPATIENT
Start: 2021-01-13 | End: 2021-01-13 | Stop reason: HOSPADM

## 2021-01-13 RX ORDER — OXYCODONE HYDROCHLORIDE AND ACETAMINOPHEN 5; 325 MG/1; MG/1
1 TABLET ORAL EVERY 6 HOURS PRN
Qty: 28 TABLET | Refills: 0 | Status: SHIPPED | OUTPATIENT
Start: 2021-01-13 | End: 2021-01-27

## 2021-01-13 RX ORDER — LIDOCAINE HYDROCHLORIDE 20 MG/ML
INJECTION, SOLUTION EPIDURAL; INFILTRATION; INTRACAUDAL; PERINEURAL PRN
Status: DISCONTINUED | OUTPATIENT
Start: 2021-01-13 | End: 2021-01-13 | Stop reason: SDUPTHER

## 2021-01-13 RX ORDER — LABETALOL HYDROCHLORIDE 5 MG/ML
5 INJECTION, SOLUTION INTRAVENOUS EVERY 10 MIN PRN
Status: DISCONTINUED | OUTPATIENT
Start: 2021-01-13 | End: 2021-01-13 | Stop reason: HOSPADM

## 2021-01-13 RX ORDER — ONDANSETRON 2 MG/ML
4 INJECTION INTRAMUSCULAR; INTRAVENOUS
Status: DISCONTINUED | OUTPATIENT
Start: 2021-01-13 | End: 2021-01-13 | Stop reason: HOSPADM

## 2021-01-13 RX ORDER — PROPOFOL 10 MG/ML
INJECTION, EMULSION INTRAVENOUS CONTINUOUS PRN
Status: DISCONTINUED | OUTPATIENT
Start: 2021-01-13 | End: 2021-01-13 | Stop reason: SDUPTHER

## 2021-01-13 RX ORDER — MORPHINE SULFATE 2 MG/ML
1 INJECTION, SOLUTION INTRAMUSCULAR; INTRAVENOUS EVERY 5 MIN PRN
Status: DISCONTINUED | OUTPATIENT
Start: 2021-01-13 | End: 2021-01-13 | Stop reason: HOSPADM

## 2021-01-13 RX ORDER — OXYCODONE HYDROCHLORIDE AND ACETAMINOPHEN 5; 325 MG/1; MG/1
2 TABLET ORAL PRN
Status: DISCONTINUED | OUTPATIENT
Start: 2021-01-13 | End: 2021-01-13 | Stop reason: HOSPADM

## 2021-01-13 RX ORDER — SODIUM CHLORIDE 9 MG/ML
INJECTION, SOLUTION INTRAVENOUS CONTINUOUS
Status: DISCONTINUED | OUTPATIENT
Start: 2021-01-13 | End: 2021-01-13 | Stop reason: HOSPADM

## 2021-01-13 RX ORDER — HYDRALAZINE HYDROCHLORIDE 20 MG/ML
5 INJECTION INTRAMUSCULAR; INTRAVENOUS
Status: DISCONTINUED | OUTPATIENT
Start: 2021-01-13 | End: 2021-01-13 | Stop reason: HOSPADM

## 2021-01-13 RX ORDER — FENTANYL CITRATE 50 UG/ML
INJECTION, SOLUTION INTRAMUSCULAR; INTRAVENOUS PRN
Status: DISCONTINUED | OUTPATIENT
Start: 2021-01-13 | End: 2021-01-13 | Stop reason: SDUPTHER

## 2021-01-13 RX ORDER — MEPERIDINE HYDROCHLORIDE 25 MG/ML
12.5 INJECTION INTRAMUSCULAR; INTRAVENOUS; SUBCUTANEOUS EVERY 5 MIN PRN
Status: DISCONTINUED | OUTPATIENT
Start: 2021-01-13 | End: 2021-01-13 | Stop reason: HOSPADM

## 2021-01-13 RX ADMIN — LIDOCAINE HYDROCHLORIDE 100 MG: 20 INJECTION, SOLUTION EPIDURAL; INFILTRATION; INTRACAUDAL; PERINEURAL at 08:29

## 2021-01-13 RX ADMIN — Medication 200 MCG: at 08:58

## 2021-01-13 RX ADMIN — CEFAZOLIN SODIUM 2 G: 10 INJECTION, POWDER, FOR SOLUTION INTRAVENOUS at 08:23

## 2021-01-13 RX ADMIN — PROPOFOL 300 MCG/KG/MIN: 10 INJECTION, EMULSION INTRAVENOUS at 08:29

## 2021-01-13 RX ADMIN — MIDAZOLAM 2 MG: 1 INJECTION INTRAMUSCULAR; INTRAVENOUS at 08:23

## 2021-01-13 RX ADMIN — FENTANYL CITRATE 50 MCG: 50 INJECTION INTRAMUSCULAR; INTRAVENOUS at 09:39

## 2021-01-13 RX ADMIN — SODIUM CHLORIDE: 9 INJECTION, SOLUTION INTRAVENOUS at 08:04

## 2021-01-13 RX ADMIN — Medication 100 MCG: at 08:52

## 2021-01-13 RX ADMIN — Medication 100 MCG: at 08:49

## 2021-01-13 RX ADMIN — Medication 100 MCG: at 09:16

## 2021-01-13 ASSESSMENT — PAIN SCALES - GENERAL
PAINLEVEL_OUTOF10: 0

## 2021-01-13 ASSESSMENT — PULMONARY FUNCTION TESTS
PIF_VALUE: 0
PIF_VALUE: 1
PIF_VALUE: 0
PIF_VALUE: 1
PIF_VALUE: 0
PIF_VALUE: 1
PIF_VALUE: 0
PIF_VALUE: 1
PIF_VALUE: 0
PIF_VALUE: 1
PIF_VALUE: 0
PIF_VALUE: 0
PIF_VALUE: 1
PIF_VALUE: 1
PIF_VALUE: 0
PIF_VALUE: 1
PIF_VALUE: 0

## 2021-01-13 ASSESSMENT — PAIN - FUNCTIONAL ASSESSMENT
PAIN_FUNCTIONAL_ASSESSMENT: 0-10
PAIN_FUNCTIONAL_ASSESSMENT: PREVENTS OR INTERFERES SOME ACTIVE ACTIVITIES AND ADLS

## 2021-01-13 ASSESSMENT — ENCOUNTER SYMPTOMS: SHORTNESS OF BREATH: 0

## 2021-01-13 ASSESSMENT — PAIN DESCRIPTION - DESCRIPTORS: DESCRIPTORS: ACHING;THROBBING;SORE

## 2021-01-13 NOTE — PROGRESS NOTES
PACU Transfer Note    Vitals:    01/13/21 1030   BP: (!) 112/52   Pulse: 66   Resp: 15   Temp: 97.8 °F (36.6 °C)   SpO2: 99%   BP within pre-op    In: 900 [I.V.:900]  Out: -     Pain assessment:  none  Pain Level: 0    Report given to Receiving unit RN.    1/13/2021 10:31 AM

## 2021-01-13 NOTE — BRIEF OP NOTE
Brief Postoperative Note      Patient: Ele Díaz  YOB: 1945  MRN: [id number]    Date of Procedure: 1/13/2021    Pre-Op Diagnosis: BASA CELL CARCINOMA LEFT LOWER EXTREMITY    Post-Op Diagnosis: Same       Procedure(s):  SURGICAL EXCISION LEFT LOWER LEG BASAL CELL CARCINOMA 4.5cm x 3 cm area resected    Surgeon(s):  Emeka Muñoz MD    Assistant:  Surgical Assistant: Chico Urena    Anesthesia: Monitor Anesthesia Care    Estimated Blood Loss (mL): Minimal    Complications: None    Specimens:   ID Type Source Tests Collected by Time Destination   A : LEFT LOWER LEG BASAL CELL LESION Tissue Tissue SURGICAL PATHOLOGY Emeka Muñoz MD 1/13/2021 4546        Implants:  * No implants in log *      Drains: * No LDAs found *    Findings: could not completely close the incision    Electronically signed by Emeka Muñoz MD on 1/13/2021 at 9:30 AM

## 2021-01-13 NOTE — PROGRESS NOTES
Patient admitted to PACU # 4 from OR at 0950 post SURGICAL EXCISION LEFT LOWER LEG BASAL CELL CARCINOMA  per Dr. Marylyn Nissen. Attached to PACU monitoring system and report received from anesthesia provider. Patient was reported to be hemodynamically stable during procedure. Patient awake on admission and denied pain. Reported on arrival to PACU that a splint was placed due to fragile skin at the surgical site. Area kept tearing.  Splint to help incision heal.

## 2021-01-13 NOTE — PROGRESS NOTES
Spoke with Dr. Rashad Tolliver as to weight bear status left leg. States NWB would be ideal, prefer no flexion of left ankle to avoid tension on incision. If walks, needs to use walker and toe touch left foot only. Wheelchair preferred.

## 2021-01-13 NOTE — OP NOTE
[facility]           [address],  [address]                                OPERATIVE REPORT    PATIENT NAME: Ghassan Cruz                       :        1945  MED REC NO:   [id number]                          ROOM:  ACCOUNT NO:   [de-identified]                           ADMIT DATE: 2021  PROVIDER:     Esteban Jay MD    DATE OF PROCEDURE:  2021    PREOPERATIVE DIAGNOSIS:  Basal cell cancer, left inner lower calf. POSTOPERATIVE DIAGNOSIS:  Basal cell cancer, left inner lower calf. OPERATION PERFORMED:  Wide excision of basal cell carcinoma with a 4.5  cm x 3 cm wide specimen. SURGEON:  Shanna Dueñas. Teodoro Stout MD    ASSISTANT:  Qiana Jensen. ANESTHESIA:  Marcaine plus MAC. Estimated blood loss minimal  INDICATIONS:  This is a 56-year-old woman, born 1945, who lives at  [facility] independent area and she has had a recurrent lesion of her  left medial calf that scabs, bleeds, dries up. This was suspicious, and  we did a biopsy proving it was a basal cell. She is here for excision. The lesion itself measured 2.5 cm x 2 cm. OPERATIVE PROCEDURE:  The patient was placed on the table in supine  position. After sedation, was prepped and draped in the usual sterile  fashion on the left thigh, which had been marked preoperatively. The  6-mm margin was measured out, and an elliptical incision as vertical as  we could make it _____ somewhat obliquely lying lesion was made to try  to get an area where the skin would be flexible enough to come together. She does have edema chronically, also some very thin and dry flaky skin. After infiltrating with Marcaine circumferentially, we took a scalpel  and made our incision again, 4.5 cm long, 3 cm wide down to the deep  subcutaneous tissue to be sure we got it all.   We held pressure and  marked the specimen with two sutures marking the posterior-lateral corner and the short sutures marking the superior edge, again it is an  ellipse and it is angled a bit with the posterior-lateral part, where  the chitra is, was higher than the opposite corner. Any case, we dried  up, ligated off several veins with clamping them and using 3-0 Vicryl. We used a Bovie unit. We then undermined superiorly and laterally to  try to get some mobility. Then, we started to close with interrupted 3-0 Vicryls. This worked for  short distance on either end and then they would start to pull through. I then went back and put vertical mattress and simple nylon, 3-0 nylons,  again worked well in the corners approximating it nicely, but as we got  towards the center, the sutures were starting to pull through. Tried a  horizontal mattress to get a better purchase, but even one of those pull  through. We ended up having about a 1 cm to a 1.5 cm opening in the  middle and may be 0.5 cm wide. We put one suture in the middle, a kind  of wide, TO RELEASE_____ the tension off the others. When she flexes her ankle,  it puts more tension on it, so we decided to put her in a splint, so  that the dressing of the wound was with Polysporin and Adaptic, Kerlix,  Ace wrap, posterior splint and then another wrap to hold the splint on. Katelyn was able to design the splint and this will help it heal.    Our plan is to see her back next Tuesday, to leave this dressing in  place, have use her walker and no full weightbearing on this side and if  she does, she needs to walk on her toes. If there is a problem, we may  need to see her this Friday in office, if there is drainage running like  that or possibly Monday depending on how she does. Prescription for  Percocet will be written, and I discussed this at length with her twin  brother, who is her power of .         Emir Vargas MD    D: 01/13/2021 10:16:12       T: 01/13/2021 12:08:24     [email or web address]  Job#: [id number]     Doc#: [id number] CC:  Génesis Beebe MD       [place], [address] [facility]

## 2021-01-13 NOTE — ANESTHESIA PRE PROCEDURE
Department of Anesthesiology  Preprocedure Note       Name:  Silvana Verdugo   Age:  77 y.o.  :  1945                                          MRN:  [id number]         Date:  2021      Surgeon: lAy Guidry):  Octavio Jay MD    Procedure: Procedure(s):  SURGICAL EXCISION LEFT LOWER LEG BASAL CELL CARCINOMA    Medications prior to admission:   Prior to Admission medications    Medication Sig Start Date End Date Taking? Authorizing Provider   neomycin-bacitracin-polymyxin (NEOSPORIN) 5-400-5000 ointment Apply topically daily Apply topically 4 times daily.    Yes Historical Provider, MD   haloperidol (HALDOL) 0.5 MG tablet nightly as needed  20  Yes Historical Provider, MD   nicotine polacrilex (NICORETTE) 2 MG gum Take 2 mg by mouth every 4 hours (while awake)   Yes Historical Provider, MD   levothyroxine (SYNTHROID) 75 MCG tablet Take 1 tablet by mouth Daily 20  Yes Juanito Guido MD   QUEtiapine (SEROQUEL) 200 MG tablet nightly  10/1/20  Yes Historical Provider, MD   QUEtiapine (SEROQUEL) 25 MG tablet nightly  10/1/20  Yes Historical Provider, MD   Multiple Vitamins-Minerals (THERAPEUTIC MULTIVITAMIN-MINERALS) tablet Take 1 tablet by mouth daily   Yes Historical Provider, MD   acetaminophen (TYLENOL) 325 MG tablet Take 650 mg by mouth every 6 hours as needed for Pain   Yes Historical Provider, MD   calcium-vitamin D (OSCAL-500) 500-200 MG-UNIT per tablet Take 1 tablet by mouth 2 times daily   Yes Historical Provider, MD   pravastatin (PRAVACHOL) 20 MG tablet TAKE 1 TABLET BY MOUTH ONE TIME A DAY  5/15/17  Yes Juanito Guido MD       Current medications:    Current Facility-Administered Medications   Medication Dose Route Frequency Provider Last Rate Last Admin    0.9 % sodium chloride infusion   Intravenous Continuous Arianne Eddy MD        sodium chloride flush 0.9 % injection 10 mL  10 mL Intravenous 2 times per day Arianne Eddy MD  sodium chloride flush 0.9 % injection 10 mL  10 mL Intravenous PRN Jazzy Hong MD        ceFAZolin (ANCEF) 2 g in dextrose 5 % 100 mL IVPB  2 g Intravenous Once Efrain Li MD           Allergies: Allergies   Allergen Reactions    Codeine        Problem List:    Patient Active Problem List   Diagnosis Code    Hyperlipidemia E78.5    Thoracic vertebral fracture- T7 (12/2010) S22.009A    B12 deficiency E53.8    KEE (stress urinary incontinence, female) N39.3    Hypothyroidism (acquired) E03.9    Wernicke's encephalopathy E51.2    Vitamin D deficiency E55.9    Nondependent alcohol abuse, in remission F10.11    Colonoscopy refused Z53.20    Bipolar I disorder, current or most recent episode manic, in partial remission ([address] F31.73    Hx of breast cancer- 1999, left, lumpectomy/ALND (Dr Chriss Duarte) Z85.3    Frequent falls R29.6    Senile osteoporosis M81.0    Arthritis M19.90    Chronic bronchitis ([address] J42    Chronic ulcer of left leg with fat layer exposed ([address] L97.922    Basal cell carcinoma (BCC) of left lower extremity C44.719       Past Medical History:        Diagnosis Date    Alcoholic ([address]     Alcoholism ([address]     Allergic rhinitis     Arthritis     Bipolar 1 disorder ([address]     Bipolar affective disorder, manic ([address]     Cancer ([address] 1999     left breast; lumpectomy    Chronic back pain     Colonoscopy refused 9/12/2012    Dysphagia     Hyperlipidemia     Hypotension     Hypothyroidism     Mild cognitive impairment     Nicotine dependence     Osteoporosis 5/20/2011    Peripheral edema     Prolonged QT interval     Repeated falls     Stress incontinence     Subdural hematoma ([address] 2009    Vitamin D deficiency        Past Surgical History:        Procedure Laterality Date    BREAST BIOPSY  1998    BREAST LUMPECTOMY  1999    Left,  no problems.     THYROIDECTOMY      WRIST FRACTURE SURGERY         Social History:    Social History     Tobacco Use  Smoking status: Former Smoker     Packs/day: 1.00     Years: 20.00     Pack years: 20.00     Types: Cigarettes     Quit date: 2019     Years since quittin.4    Smokeless tobacco: Never Used   Substance Use Topics    Alcohol use: No     Alcohol/week: 0.0 standard drinks     Comment: hx alcoholism. (has had alcohol in last 2 months)                                Counseling given: Not Answered      Vital Signs (Current):   Vitals:    21 1311   Weight: 171 lb 4 oz (77.7 kg)   Height: 5' 1\" (1.549 m)                                              BP Readings from Last 3 Encounters:   21 130/70   20 122/78   20 124/70       NPO Status:                                                                                 BMI:   Wt Readings from Last 3 Encounters:   21 171 lb 4 oz (77.7 kg)   21 170 lb (77.1 kg)   20 151 lb (68.5 kg)     Body mass index is 32.36 kg/m². CBC:   Lab Results   Component Value Date    WBC 6.7 2020    RBC 4.36 2020    HGB 13.7 2020    HCT 40.9 2020    MCV 93.9 2020    RDW 14.6 2020     2020       CMP:   Lab Results   Component Value Date     2021    K 4.4 2021    K 3.7 2019     2021    CO2 27 2021    BUN 13 2021    CREATININE 0.8 2021    GFRAA >60 2021    GFRAA >60 2012    AGRATIO 2.4 2021    LABGLOM >60 2021    LABGLOM 63.0 2011    GLUCOSE 94 2021    GLUCOSE 85 2011    PROT 6.2 2021    PROT 6.7 2012    CALCIUM 9.9 2021    BILITOT 0.4 2021    ALKPHOS 99 2021    AST 18 2021    ALT 11 2021       POC Tests: No results for input(s): POCGLU, POCNA, POCK, POCCL, POCBUN, POCHEMO, POCHCT in the last 72 hours.     Coags:   Lab Results   Component Value Date    PROTIME 13.0 2017    INR 1.15 2017    APTT 31.8 08/10/2017 HCG (If Applicable): No results found for: PREGTESTUR, PREGSERUM, HCG, HCGQUANT     ABGs: No results found for: PHART, PO2ART, YCI5XAN, GIL1NDS, BEART, Y2SOGGHB     Type & Screen (If Applicable):  No results found for: LABABO, LABRH    Drug/Infectious Status (If Applicable):  No results found for: HIV, HEPCAB    COVID-19 Screening (If Applicable): No results found for: COVID19      Anesthesia Evaluation  Patient summary reviewed  Airway: Mallampati: II  TM distance: >3 FB   Neck ROM: full  Mouth opening: > = 3 FB Dental:    (+) edentulous      Pulmonary:       (-) COPD, asthma and shortness of breath                           Cardiovascular:    (+) hyperlipidemia    (-) hypertension, valvular problems/murmurs, past MI, CAD, CABG/stent, dysrhythmias and  angina                Neuro/Psych:   (+) neuromuscular disease:, psychiatric history:   (-) seizures, TIA and CVA           GI/Hepatic/Renal:        (-) GERD, PUD, liver disease and no renal disease       Endo/Other:    (+) hypothyroidism: arthritis:., malignancy/cancer (breast, basal). (-) diabetes mellitus               Abdominal:           Vascular: negative vascular ROS. Anesthesia Plan      MAC     ASA 3     (I discussed intravenous sedation to the patient's satisfaction including risks and alternatives. The patient agreed with the plan and has no further questions. MARILY TRAORE )  Induction: intravenous. Anesthetic plan and risks discussed with patient. Plan discussed with CRNA.                   Marco Best MD   1/13/2021

## 2021-01-13 NOTE — ANESTHESIA POSTPROCEDURE EVALUATION
Department of Anesthesiology  Postprocedure Note    Patient: Wil Smith  MRN: [id number]  YOB: 1945  Date of evaluation: 1/13/2021  Time:  10:51 AM     Procedure Summary     Date: 01/13/21 Room / Location: [facility] OR [address] [place] [address]    Anesthesia Start: 6816 Anesthesia Stop: 5152    Procedure: SURGICAL EXCISION LEFT LOWER LEG BASAL CELL CARCINOMA (Left ) Diagnosis:       Basal cell carcinoma of lower extremity, left      (BASA CELL CARCINOMA LEFT LOWER EXTREMITY)    Surgeons: Anahy Osorio MD Responsible Provider: Lois Durand MD    Anesthesia Type: MAC ASA Status: 3          Anesthesia Type: MAC    Oma Phase I: Oma Score: 10    Oma Phase II: Oma Score: 10    Last vitals: Reviewed and per EMR flowsheets.        Anesthesia Post Evaluation    Patient location during evaluation: PACU  Level of consciousness: awake and alert  Airway patency: patent  Nausea & Vomiting: no nausea and no vomiting  Complications: no  Cardiovascular status: blood pressure returned to baseline  Respiratory status: acceptable  Hydration status: euvolemic  Comments: Postoperative Anesthesia Note    Name:    Wil Smith  MRN:      [id number]    Patient Vitals in the past 12 hrs:  01/13/21 1036, BP:(!) 118/50, Temp:97.7 °F (36.5 °C), Temp src:Temporal, Pulse:70, Resp:16, SpO2:100 %  01/13/21 1030, BP:(!) 112/52, Temp:97.8 °F (36.6 °C), Temp src:Temporal, Pulse:66, Resp:15, SpO2:99 %  01/13/21 1024, Pulse:65  01/13/21 1015, BP:(!) 110/58, Pulse:69, Resp:19, SpO2:97 %  01/13/21 1010, BP:(!) 118/50, Pulse:72, Resp:16, SpO2:99 %  01/13/21 1005, BP:108/62, Pulse:75, Resp:27, SpO2:96 %  01/13/21 1000, BP:95/67, Pulse:75, Resp:15, SpO2:96 %  01/13/21 0955, BP:101/67, Pulse:79, Resp:22, SpO2:94 %  01/13/21 0950, BP:(!) 109/57, Temp:97.1 °F (36.2 °C), Temp src:Temporal, Pulse:78, Resp:15, SpO2:93 % 01/13/21 0757, BP:(!) 118/56, Temp:98.4 °F (36.9 °C), Temp src:Temporal, Pulse:76, Resp:14, SpO2:96 %, Height:5' 1\" (1.549 m), Weight:170 lb (77.1 kg)     LABS:    CBC  Lab Results       Component                Value               Date/Time                  WBC                      6.7                 06/09/2020 04:24 PM        HGB                      13.7                06/09/2020 04:24 PM        HCT                      40.9                06/09/2020 04:24 PM        PLT                      178                 06/09/2020 04:24 PM   RENAL  Lab Results       Component                Value               Date/Time                  NA                       141                 01/11/2021 01:30 PM        K                        4.4                 01/11/2021 01:30 PM        K                        3.7                 08/07/2019 02:59 PM        CL                       104                 01/11/2021 01:30 PM        CO2                      27                  01/11/2021 01:30 PM        BUN                      13                  01/11/2021 01:30 PM        CREATININE               0.8                 01/11/2021 01:30 PM        GLUCOSE                  94                  01/11/2021 01:30 PM        GLUCOSE                  85                  06/01/2011 03:45 PM   COAGS  Lab Results       Component                Value               Date/Time                  PROTIME                  13.0                12/21/2017 12:30 PM        INR                      1.15                12/21/2017 12:30 PM        APTT                     31.8                08/10/2017 05:34 PM     Intake & Output:  @30XWHW@    Nausea & Vomiting:  No    Level of Consciousness:  Awake    Pain Assessment:  Adequate analgesia    Anesthesia Complications:  No apparent anesthetic complications    SUMMARY      Vital signs stable OK to discharge from Stage I post anesthesia care.   Care transferred from Anesthesiology department on discharge from perioperative area

## 2021-01-13 NOTE — PROGRESS NOTES
Unable to reach Nurse July at [facility] to given report. Message left to have nurse call for questions/ concerns.

## 2021-01-13 NOTE — H&P
Agree with H&P from Outpt notes, no changes noted . The risk, benefits, and alternatives of the proposed procedure have been explained to the patient (or appropriate guardian) and understanding verbalized. All questions answered. Patient wishes to proceed. Lt leg marked for wide excision for Basal cell Ca.  Had two Covid tests yesterday and has had vaccine x2

## 2021-01-13 NOTE — PROGRESS NOTES
Tolerating oral intake and being up in chair. Discharge instructions given to patient and brother. Verbalize understanding. Script given.

## 2021-01-19 ENCOUNTER — OFFICE VISIT (OUTPATIENT)
Dept: SURGERY | Age: 76
End: 2021-01-19

## 2021-01-19 VITALS — HEIGHT: 61 IN | BODY MASS INDEX: 32.12 KG/M2 | SYSTOLIC BLOOD PRESSURE: 118 MMHG | DIASTOLIC BLOOD PRESSURE: 50 MMHG

## 2021-01-19 DIAGNOSIS — T14.8XXA NONHEALING NONSURGICAL WOUND WITH FAT LAYER EXPOSED: ICD-10-CM

## 2021-01-19 DIAGNOSIS — L97.922 CHRONIC ULCER OF LEFT LEG WITH FAT LAYER EXPOSED (HCC): Primary | ICD-10-CM

## 2021-01-19 DIAGNOSIS — C44.719 BASAL CELL CARCINOMA (BCC) OF LEFT LOWER EXTREMITY: ICD-10-CM

## 2021-01-19 PROCEDURE — 99024 POSTOP FOLLOW-UP VISIT: CPT | Performed by: SURGERY

## 2021-01-19 NOTE — PATIENT INSTRUCTIONS
change the wound daily, cleaning then putting Medihoney, gauze, Kerlix then wrapping with an ace wrap. Return in one week for a post op wound check.

## 2021-01-19 NOTE — PROGRESS NOTES
Daily Progress Note   Elmira Hancock MD      1/19/2021    Chief Complaint   Patient presents with    Post-Op Check     surgical incision of carcinoma. pt reports pain pain level at 6. HISTORY OF PRESENT ILLNESS:                The patient is a 77 y.o. female who presents with a post op visit for surgical removal of a skin cancer on 1/13/2021. Past Medical History:   Diagnosis Date    Alcoholic ([address]     Alcoholism ([address]     Allergic rhinitis     Arthritis     Bipolar 1 disorder ([address]     Bipolar affective disorder, manic ([address]     Cancer ([address] 1999     left breast; lumpectomy    Chronic back pain     Chronic ulcer of left leg with fat layer exposed ([address] 12/1/2020    Suspect this is a skin cancer    Colonoscopy refused 9/12/2012    Dysphagia     Hyperlipidemia     Hypotension     Hypothyroidism     Mild cognitive impairment     Nicotine dependence     Osteoporosis 5/20/2011    Peripheral edema     Prolonged QT interval     Repeated falls     Stress incontinence     Subdural hematoma ([address] 2009    Vitamin D deficiency        Past Surgical History:   Procedure Laterality Date    BREAST BIOPSY  1998    BREAST LUMPECTOMY  1999    Left,  no problems.     LEG BIOPSY EXCISION Left 1/13/2021    SURGICAL EXCISION LEFT LOWER LEG BASAL CELL CARCINOMA performed by Aimee Appiah MD at [address] [place] [address]         Social History     Socioeconomic History    Marital status:      Spouse name: Not on file    Number of children: 2    Years of education: Not on file    Highest education level: Not on file   Occupational History    Occupation: Retired     Employer: [employer]   Social Needs    Financial resource strain: Not on file    Food insecurity     Worry: Not on file     Inability: Not on file   [employer] needs     Medical: Not on file     Non-medical: Not on file   Tobacco Use    Smoking status: Former Smoker Packs/day: 1.00     Years: 20.00     Pack years: 20.00     Types: Cigarettes     Quit date: 2019     Years since quittin.4    Smokeless tobacco: Never Used   Substance and Sexual Activity    Alcohol use: No     Alcohol/week: 0.0 standard drinks     Comment: hx alcoholism. (has had alcohol in last 2 months)    Drug use: No    Sexual activity: Not Currently   Lifestyle    Physical activity     Days per week: Not on file     Minutes per session: Not on file    Stress: Not on file   Relationships    Social connections     Talks on phone: Not on file     Gets together: Not on file     Attends Mandaeism service: Not on file     Active member of club or organization: Not on file     Attends meetings of clubs or organizations: Not on file     Relationship status: Not on file    Intimate partner violence     Fear of current or ex partner: Not on file     Emotionally abused: Not on file     Physically abused: Not on file     Forced sexual activity: Not on file   Other Topics Concern    Not on file   Social History Narrative    L[facility] at [address] [facility]. Family History   Problem Relation Age of Onset    Cancer Mother         Bone Cancer    Cancer Father         Lung cancer    Retinal Detachment Brother          Current Outpatient Medications:     Wound Dressings (MEDIHONEY WOUND/BURN DRESSING) GEL gel, Apply 1 each topically daily, Disp: 1 Tube, Rfl: 2    oxyCODONE-acetaminophen (PERCOCET) 5-325 MG per tablet, Take 1 tablet by mouth every 6 hours as needed for Pain for up to 14 days. Intended supply: 7 days. Take lowest dose possible to manage pain, Disp: 28 tablet, Rfl: 0    neomycin-bacitracin-polymyxin (NEOSPORIN) 5-400-5000 ointment, Apply topically daily Apply topically 4 times daily. , Disp: , Rfl:     haloperidol (HALDOL) 0.5 MG tablet, nightly as needed , Disp: , Rfl:     nicotine polacrilex (NICORETTE) 2 MG gum, Take 2 mg by mouth every 4 hours (while awake), Disp: , Rfl:   levothyroxine (SYNTHROID) 75 MCG tablet, Take 1 tablet by mouth Daily, Disp: 90 tablet, Rfl: 1    QUEtiapine (SEROQUEL) 200 MG tablet, nightly , Disp: , Rfl:     QUEtiapine (SEROQUEL) 25 MG tablet, nightly , Disp: , Rfl:     Multiple Vitamins-Minerals (THERAPEUTIC MULTIVITAMIN-MINERALS) tablet, Take 1 tablet by mouth daily, Disp: , Rfl:     acetaminophen (TYLENOL) 325 MG tablet, Take 650 mg by mouth every 6 hours as needed for Pain, Disp: , Rfl:     calcium-vitamin D (OSCAL-500) 500-200 MG-UNIT per tablet, Take 1 tablet by mouth 2 times daily, Disp: , Rfl:     pravastatin (PRAVACHOL) 20 MG tablet, TAKE 1 TABLET BY MOUTH ONE TIME A DAY , Disp: 20 tablet, Rfl: 5    Codeine    Vitals:    01/19/21 0911   BP: (!) 118/50   Site: Right Upper Arm   Position: Sitting   Cuff Size: Medium Adult   Height: 5' 1\" (1.549 m)       Admission on 01/13/2021, Discharged on 01/13/2021   Component Date Value Ref Range Status    SARS-CoV-2, NAAT 01/13/2021 Not Detected  Not Detected Final    Comment: Rapid NAAT:   Negative results should be treated as presumptive and,  if inconsistent with clinical signs and symptoms or necessary for  patient management, should be tested with an alternative molecular  assay. Negative results do not preclude SARS-CoV-2 infection and  should not be used as the sole basis for patient management decisions. This test has been authorized by the FDA under an Emergency Use  Authorization (EUA) for use by authorized laboratories. Fact sheet for Healthcare Providers:  Ankit.es  Fact sheet for Patients: Ankit.es    METHODOLOGY: Isothermal Nucleic Acid Amplification         Review of Systems   Constitutional: Negative. HENT: Negative. Eyes: Negative. Respiratory: Negative. Cardiovascular: Negative. Gastrointestinal: Negative. Endocrine: Negative. Genitourinary: Negative. Musculoskeletal: Positive for gait problem (uses a walker). Skin: Positive for wound (left lower extremity). Allergic/Immunologic: Negative. Hematological: Negative. Psychiatric/Behavioral: Negative. All other systems reviewed and are negative. Physical Exam  Vitals signs and nursing note reviewed. Constitutional:       General: She is not in acute distress. Appearance: Normal appearance. She is well-developed. She is not ill-appearing or toxic-appearing. HENT:      Head: Normocephalic and atraumatic. Right Ear: External ear normal.      Left Ear: External ear normal.      Mouth/Throat:      Pharynx: No oropharyngeal exudate. Eyes:      General: No scleral icterus. Conjunctiva/sclera: Conjunctivae normal.      Pupils: Pupils are equal, round, and reactive to light. Neck:      Musculoskeletal: Normal range of motion and neck supple. No edema or erythema. Thyroid: No thyromegaly. Vascular: Normal carotid pulses. No carotid bruit or JVD. Trachea: No tracheal deviation. Cardiovascular:      Rate and Rhythm: Normal rate and regular rhythm. Pulses:           Carotid pulses are 2+ on the right side and 2+ on the left side. Radial pulses are 2+ on the right side and 2+ on the left side. Femoral pulses are 2+ on the right side and 2+ on the left side. Dorsalis pedis pulses are 0 on the right side and 0 on the left side. Posterior tibial pulses are 1+ on the right side and 2+ on the left side. Heart sounds: Normal heart sounds and S1 normal. No murmur.       Comments:     Doppler 12/1/2020:  Rt DP: monophasic  Rt PT:monophasic (stronger)   Rt AT: not checked    Lt DP: biphasic  Lt PT: biphasic  Lt AT: not checked    MEASUREMENTS 12/1/2020:    RIGHT ANKLE: 20.8 cm   RIGHT CALF: 38.4 cm     LEFT ANKLE: 22.2 cm   LEFT CALF: 40.0 cm    Pulmonary: Behavior: Behavior normal.         Thought Content: Thought content normal.         Judgment: Judgment normal.           ASSESSMENT:    Problem List Items Addressed This Visit     Nonhealing nonsurgical wound with fat layer exposed    Chronic ulcer of left leg with fat layer exposed ([address] - Primary    Basal cell carcinoma (BCC) of left lower extremity      Expected to find an open wound did not expect multiple sutures to have pulled through about half of the original sutures were removed wound now measures 4 cm long 2 and half centimeters wide and approximately 5 mm deep. She seems comfortable not having a lot of pain are gone get rid of the splint use the Ace wrap she can walk with her walker we will does have to let this heal in over time or get a try Medihoney now    PLAN:     change the wound daily, cleaning then putting Medihoney, gauze, Kerlix then wrapping with an ace wrap. Return in one week for a post op wound check. Gurmeet Ndiaye MA am scribing for and in the presence of Trung Espinoza MD on this date of 01/19/21    I Prestonjennifer Lyon MD personally performed the services described in this documentation as scribed by the Medical Assistant Nadine Johnson in my presence and it is both accurate and complete.         Electronically signed by Trung Espinoza MD on 1/19/2021 at 11:10 AM

## 2021-01-25 ENCOUNTER — TELEPHONE (OUTPATIENT)
Dept: SURGERY | Age: 76
End: 2021-01-25

## 2021-01-25 NOTE — TELEPHONE ENCOUNTER
Dr. Alfredo Jasmine is out today. Hayde James [facility] in [facility]. She is coming in on Tuesday to see Tru Dan about the infection.

## 2021-01-26 ENCOUNTER — OFFICE VISIT (OUTPATIENT)
Dept: SURGERY | Age: 76
End: 2021-01-26

## 2021-01-26 DIAGNOSIS — Z09 POSTOP CHECK: Primary | ICD-10-CM

## 2021-01-26 PROCEDURE — 99024 POSTOP FOLLOW-UP VISIT: CPT | Performed by: PHYSICIAN ASSISTANT

## 2021-01-26 RX ORDER — AMOXICILLIN AND CLAVULANATE POTASSIUM 875; 125 MG/1; MG/1
1 TABLET, FILM COATED ORAL 2 TIMES DAILY
Qty: 20 TABLET | Refills: 0 | Status: SHIPPED | OUTPATIENT
Start: 2021-01-26 | End: 2021-02-02

## 2021-01-26 NOTE — PROGRESS NOTES
LLE wound site looks ok. Healing well. Some serous drainage. Mild surrounding erythema. Pain controlled. Will start on augmentin for possible wound infection.

## 2021-01-29 ENCOUNTER — OFFICE VISIT (OUTPATIENT)
Dept: SURGERY | Age: 76
End: 2021-01-29

## 2021-01-29 VITALS — BODY MASS INDEX: 32.12 KG/M2 | SYSTOLIC BLOOD PRESSURE: 118 MMHG | DIASTOLIC BLOOD PRESSURE: 50 MMHG | HEIGHT: 61 IN

## 2021-01-29 DIAGNOSIS — R60.0 BILATERAL LEG EDEMA: Primary | ICD-10-CM

## 2021-01-29 DIAGNOSIS — C44.719 BASAL CELL CARCINOMA (BCC) OF LEFT LOWER EXTREMITY: ICD-10-CM

## 2021-01-29 DIAGNOSIS — T14.8XXA NONHEALING NONSURGICAL WOUND WITH FAT LAYER EXPOSED: Primary | ICD-10-CM

## 2021-01-29 PROCEDURE — 99024 POSTOP FOLLOW-UP VISIT: CPT | Performed by: SURGERY

## 2021-01-29 RX ORDER — POTASSIUM CHLORIDE 20 MEQ/1
20 TABLET, EXTENDED RELEASE ORAL DAILY
Qty: 5 TABLET | Refills: 0 | OUTPATIENT
Start: 2021-01-29 | End: 2021-11-29

## 2021-01-29 RX ORDER — FUROSEMIDE 40 MG/1
40 TABLET ORAL DAILY
Qty: 5 TABLET | Refills: 0 | OUTPATIENT
Start: 2021-01-29 | End: 2021-11-29

## 2021-01-29 ASSESSMENT — ENCOUNTER SYMPTOMS
ALLERGIC/IMMUNOLOGIC NEGATIVE: 1
EYES NEGATIVE: 1
GASTROINTESTINAL NEGATIVE: 1
RESPIRATORY NEGATIVE: 1

## 2021-01-29 NOTE — PROGRESS NOTES
Daily Progress Note   Jennifer Cano MD      1/29/2021    Chief Complaint   Patient presents with    Wound Check     still having leg pain (pain level 6)          HISTORY OF PRESENT ILLNESS:                The patient is a 77 y.o. female who presents with a follow up for left leg wound check. Mrs. Farias saw [place], [place] on 1/26/2021, when he put her on antibiotics as a precaution as [facility] thought she had some infection. Today, the wound looks as if it is slowly healing. She still has some sutures that are being removed. The wound is being debrided today. Past Medical History:   Diagnosis Date    Alcoholic ([address]     Alcoholism ([address]     Allergic rhinitis     Arthritis     Bipolar 1 disorder ([address]     Bipolar affective disorder, manic ([address]     Cancer ([address] 1999     left breast; lumpectomy    Chronic back pain     Chronic ulcer of left leg with fat layer exposed ([address] 12/1/2020    Suspect this is a skin cancer    Colonoscopy refused 9/12/2012    Dysphagia     Hyperlipidemia     Hypotension     Hypothyroidism     Mild cognitive impairment     Nicotine dependence     Osteoporosis 5/20/2011    Peripheral edema     Prolonged QT interval     Repeated falls     Stress incontinence     Subdural hematoma ([address] 2009    Vitamin D deficiency        Past Surgical History:   Procedure Laterality Date    BREAST BIOPSY  1998    BREAST LUMPECTOMY  1999    Left,  no problems.     LEG BIOPSY EXCISION Left 1/13/2021    SURGICAL EXCISION LEFT LOWER LEG BASAL CELL CARCINOMA performed by Amilcar Mercedes MD at [address] [place] [address]         Social History     Socioeconomic History    Marital status:      Spouse name: Not on file    Number of children: 2    Years of education: Not on file    Highest education level: Not on file   Occupational History    Occupation: Retired     Employer: [employer]  Financial resource strain: Not on file    Food insecurity     Worry: Not on file     Inability: Not on file   [employer] needs     Medical: Not on file     Non-medical: Not on file   Tobacco Use    Smoking status: Former Smoker     Packs/day: 1.00     Years: 20.00     Pack years: 20.00     Types: Cigarettes     Quit date: 2019     Years since quittin.5    Smokeless tobacco: Never Used   Substance and Sexual Activity    Alcohol use: No     Alcohol/week: 0.0 standard drinks     Comment: hx alcoholism. (has had alcohol in last 2 months)    Drug use: No    Sexual activity: Not Currently   Lifestyle    Physical activity     Days per week: Not on file     Minutes per session: Not on file    Stress: Not on file   Relationships    Social connections     Talks on phone: Not on file     Gets together: Not on file     Attends Scientologist service: Not on file     Active member of club or organization: Not on file     Attends meetings of clubs or organizations: Not on file     Relationship status: Not on file    Intimate partner violence     Fear of current or ex partner: Not on file     Emotionally abused: Not on file     Physically abused: Not on file     Forced sexual activity: Not on file   Other Topics Concern    Not on file   Social History Narrative    Lives at [address] [place] [address].        Family History   Problem Relation Age of Onset    Cancer Mother         Bone Cancer    Cancer Father         Lung cancer    Retinal Detachment Brother          Current Outpatient Medications:     furosemide (LASIX) 40 MG tablet, Take 1 tablet by mouth daily for 5 days, Disp: 5 tablet, Rfl: 0    potassium chloride (KLOR-CON M) 20 MEQ extended release tablet, Take 1 tablet by mouth daily for 5 days, Disp: 5 tablet, Rfl: 0    amoxicillin-clavulanate (AUGMENTIN) 875-125 MG per tablet, Take 1 tablet by mouth 2 times daily for 7 days, Disp: 20 tablet, Rfl: 0   oxyCODONE-acetaminophen (PERCOCET) 5-325 MG per tablet, Take 1 tablet by mouth every 6 hours as needed for Pain for up to 7 days. Intended supply: 3 days. Take lowest dose possible to manage pain, Disp: 20 tablet, Rfl: 0    Wound Dressings (MEDIHONEY WOUND/BURN DRESSING) GEL gel, Apply 1 each topically daily, Disp: 1 Tube, Rfl: 2    neomycin-bacitracin-polymyxin (NEOSPORIN) 5-400-5000 ointment, Apply topically daily Apply topically 4 times daily. , Disp: , Rfl:     haloperidol (HALDOL) 0.5 MG tablet, nightly as needed , Disp: , Rfl:     nicotine polacrilex (NICORETTE) 2 MG gum, Take 2 mg by mouth every 4 hours (while awake), Disp: , Rfl:     levothyroxine (SYNTHROID) 75 MCG tablet, Take 1 tablet by mouth Daily, Disp: 90 tablet, Rfl: 1    QUEtiapine (SEROQUEL) 200 MG tablet, nightly , Disp: , Rfl:     QUEtiapine (SEROQUEL) 25 MG tablet, nightly , Disp: , Rfl:     Multiple Vitamins-Minerals (THERAPEUTIC MULTIVITAMIN-MINERALS) tablet, Take 1 tablet by mouth daily, Disp: , Rfl:     acetaminophen (TYLENOL) 325 MG tablet, Take 650 mg by mouth every 6 hours as needed for Pain, Disp: , Rfl:     calcium-vitamin D (OSCAL-500) 500-200 MG-UNIT per tablet, Take 1 tablet by mouth 2 times daily, Disp: , Rfl:     pravastatin (PRAVACHOL) 20 MG tablet, TAKE 1 TABLET BY MOUTH ONE TIME A DAY , Disp: 20 tablet, Rfl: 5    Codeine    Vitals:    01/29/21 1005   BP: (!) 118/50   Site: Left Upper Arm   Position: Sitting   Cuff Size: Medium Adult   Height: 5' 1\" (1.549 m)       Admission on 01/13/2021, Discharged on 01/13/2021   Component Date Value Ref Range Status    SARS-CoV-2, NAAT 01/13/2021 Not Detected  Not Detected Final    Comment: Rapid NAAT:   Negative results should be treated as presumptive and,  if inconsistent with clinical signs and symptoms or necessary for  patient management, should be tested with an alternative molecular  assay.  Negative results do not preclude SARS-CoV-2 infection and should not be used as the sole basis for patient management decisions. This test has been authorized by the FDA under an Emergency Use  Authorization (EUA) for use by authorized laboratories. Fact sheet for Healthcare Providers:  Nicholas  Fact sheet for Patients: Ankit.bina    METHODOLOGY: Isothermal Nucleic Acid Amplification         Review of Systems   Constitutional: Negative. HENT: Negative. Eyes: Negative. Respiratory: Negative. Cardiovascular: Negative. Gastrointestinal: Negative. Endocrine: Negative. Genitourinary: Negative. Musculoskeletal: Positive for gait problem (uses a walker). Skin: Positive for wound (left lower extremity). Allergic/Immunologic: Negative. Hematological: Negative. Psychiatric/Behavioral: Negative. All other systems reviewed and are negative. Physical Exam  Vitals signs and nursing note reviewed. Constitutional:       General: She is not in acute distress. Appearance: Normal appearance. She is well-developed. She is not ill-appearing or toxic-appearing. HENT:      Head: Normocephalic and atraumatic. Right Ear: External ear normal.      Left Ear: External ear normal.      Mouth/Throat:      Pharynx: No oropharyngeal exudate. Eyes:      General: No scleral icterus. Conjunctiva/sclera: Conjunctivae normal.      Pupils: Pupils are equal, round, and reactive to light. Neck:      Musculoskeletal: Normal range of motion and neck supple. No edema or erythema. Thyroid: No thyromegaly. Vascular: Normal carotid pulses. No carotid bruit or JVD. Trachea: No tracheal deviation. Cardiovascular:      Rate and Rhythm: Normal rate and regular rhythm. Pulses:           Carotid pulses are 2+ on the right side and 2+ on the left side. Radial pulses are 2+ on the right side and 2+ on the left side. Femoral pulses are 2+ on the right side and 2+ on the left side. Dorsalis pedis pulses are 0 on the right side and 0 on the left side. Posterior tibial pulses are 1+ on the right side and 2+ on the left side. Heart sounds: Normal heart sounds and S1 normal. No murmur. Comments:     Doppler 12/1/2020:  Rt DP: monophasic  Rt PT:monophasic (stronger)   Rt AT: not checked    Lt DP: biphasic  Lt PT: biphasic  Lt AT: not checked    MEASUREMENTS 12/1/2020:    RIGHT ANKLE: 20.8 cm   RIGHT CALF: 38.4 cm     LEFT ANKLE: 22.2 cm   LEFT CALF: 40.0 cm    Pulmonary:      Effort: Pulmonary effort is normal. No tachypnea, accessory muscle usage or respiratory distress. Breath sounds: Normal breath sounds. No stridor. No wheezing or rales. Chest:       Abdominal:      General: Bowel sounds are normal. There is no distension. Palpations: Abdomen is soft. There is no mass. Tenderness: There is no abdominal tenderness. There is no guarding or rebound. Hernia: No hernia is present. There is no hernia in the ventral area or left inguinal area. Genitourinary:     Comments: Rectal exam/stool guaiac not indicated. Musculoskeletal: Normal range of motion. General: No tenderness. Right shoulder: She exhibits normal range of motion, no deformity and no pain. Legs:       Comments:   1/29/2021  3.1 cm long x 2.7 wide    1/19/2021    Opening of wound on left shin/calf:  4 cm long x 2.5 cm wide   Lymphadenopathy:      Head:      Right side of head: No submandibular, preauricular, posterior auricular or occipital adenopathy. Left side of head: No submandibular, preauricular, posterior auricular or occipital adenopathy. Cervical: No cervical adenopathy. Right cervical: No superficial, deep or posterior cervical adenopathy. Left cervical: No superficial, deep or posterior cervical adenopathy.       Upper Body: Right upper body: No supraclavicular or pectoral adenopathy. Left upper body: No supraclavicular or pectoral adenopathy. Skin:     General: Skin is warm and dry. Coloration: Skin is not pale. Findings: No bruising, erythema, laceration, lesion or rash. Neurological:      Mental Status: She is alert and oriented to person, place, and time. Cranial Nerves: No cranial nerve deficit. Sensory: No sensory deficit. Motor: No atrophy or abnormal muscle tone. Coordination: Coordination normal.      Gait: Gait normal.      Deep Tendon Reflexes: Reflexes are normal and symmetric. Psychiatric:         Speech: Speech normal.         Behavior: Behavior normal.         Thought Content: Thought content normal.         Judgment: Judgment normal.           ASSESSMENT:    Problem List Items Addressed This Visit     Nonhealing nonsurgical wound with fat layer exposed - Primary    Basal cell carcinoma (BCC) of left lower extremity        Leg wound sharply and excisionally debrided local anesthetic was needed a point 1 cm x 2.7 cm wide by 5 mm deep curette was used to excise subcutaneous debris couple more sutures were removed that had pulled through, after debridement got some reasonable bleeding consistent with healthy tissue and part granulation tissue continue using Medihoney daily  PLAN:    Continue using medihoney and gauze on the wound, either covering with gauze or gauze and an ace wrap, with the ace wrap going to the top of the calf. Elliott beebe shower between dressing changes. Return in one week for left leg wound check. Angela Tay MA am scribing for and in the presence of Shahnaz Delgado MD on this date of 01/29/21    I Amaris Gutierrez MD personally performed the services described in this documentation as scribed by the Medical Assistant Alcides Beebe in my presence and it is both accurate and complete. Electronically signed by Robert Sharma MD on 1/29/2021 at 5:03 PM

## 2021-01-29 NOTE — PROGRESS NOTES
Please notify Denisha Farias's brother and the nursing home that I am ordering lasix but also labs and Echocardiogram for her chronically swollen legs. Lasix 40 mg x 3 days  potassium 20 meq for 3 days. See orders.

## 2021-01-29 NOTE — PATIENT INSTRUCTIONS
Continue using medihoney and gauze on the wound, either covering with gauze or gauze and an ace wrap, with the ace wrap going to the top of the calf. Miguel Angel Williamson may shower between dressing changes. Return in one week for left leg wound check.

## 2021-02-03 ENCOUNTER — TELEPHONE (OUTPATIENT)
Dept: FAMILY MEDICINE CLINIC | Age: 76
End: 2021-02-03

## 2021-02-03 NOTE — TELEPHONE ENCOUNTER
Please notify Jada Mcghee Jarrett's brother Indiana Wang that her labs show low protien levels still. But her 'prealbumin' (a sign of malnutrition) is in the normal range. If she does not drink a high protein shake (like Boost) at least once a day, I would advise that  I also advise daily use of compression stockings to prevent further leg edeam, infections and wounds. Thanks.

## 2021-02-04 NOTE — TELEPHONE ENCOUNTER
Called Don. He said she will not drink the Boost (or any protein drink) it goes down the drain. She has had Compression stockings in the past and she won't wear them. She has appt with Dr. Mary Tolentino tomorrow. He will see if he can stress to her about wearing the compression stockings. Imelda Grady said her edema is not as bad when she is able to use walker and get up to get around. Sitting in the wheelchair is making her legs worse. He will let us know about what Dr. Mary Tolentino says.

## 2021-02-04 NOTE — TELEPHONE ENCOUNTER
Thanks, Espinoza Jimenez. Jessica Meredith is likely right. Sitting all day will make the legs more edematous.

## 2021-02-05 ENCOUNTER — OFFICE VISIT (OUTPATIENT)
Dept: SURGERY | Age: 76
End: 2021-02-05

## 2021-02-05 VITALS — DIASTOLIC BLOOD PRESSURE: 73 MMHG | BODY MASS INDEX: 32.12 KG/M2 | SYSTOLIC BLOOD PRESSURE: 122 MMHG | HEIGHT: 61 IN

## 2021-02-05 DIAGNOSIS — L97.922 CHRONIC ULCER OF LEFT LEG WITH FAT LAYER EXPOSED (HCC): Primary | ICD-10-CM

## 2021-02-05 PROCEDURE — 99024 POSTOP FOLLOW-UP VISIT: CPT | Performed by: SURGERY

## 2021-02-05 ASSESSMENT — ENCOUNTER SYMPTOMS
RESPIRATORY NEGATIVE: 1
GASTROINTESTINAL NEGATIVE: 1
EYES NEGATIVE: 1
ALLERGIC/IMMUNOLOGIC NEGATIVE: 1

## 2021-02-05 NOTE — PROGRESS NOTES
Daily Progress Note   Emma Solo MD      2/5/2021    Chief Complaint   Patient presents with    Post-Op Check     left leg wound check         HISTORY OF PRESENT ILLNESS:                The patient is a 77 y.o. female who presents with a follow up for left leg wound check. Mrs. Farias saw [employer], [place] on 1/26/2021, when he put her on antibiotics as a precaution as [facility] thought she had some infection. Today, the wound looks as if it is slowly healing. She still has some sutures that are being removed. The wound is being debrided today. Past Medical History:   Diagnosis Date    Alcoholic ([address]     Alcoholism ([address]     Allergic rhinitis     Arthritis     Bipolar 1 disorder ([address]     Bipolar affective disorder, manic ([address]     Cancer ([address] 1999     left breast; lumpectomy    Chronic back pain     Chronic ulcer of left leg with fat layer exposed ([address] 12/1/2020    Suspect this is a skin cancer    Colonoscopy refused 9/12/2012    Dysphagia     Hyperlipidemia     Hypotension     Hypothyroidism     Mild cognitive impairment     Nicotine dependence     Osteoporosis 5/20/2011    Peripheral edema     Prolonged QT interval     Repeated falls     Stress incontinence     Subdural hematoma ([address] 2009    Vitamin D deficiency        Past Surgical History:   Procedure Laterality Date    BREAST BIOPSY  1998    BREAST LUMPECTOMY  1999    Left,  no problems.     LEG BIOPSY EXCISION Left 1/13/2021    SURGICAL EXCISION LEFT LOWER LEG BASAL CELL CARCINOMA performed by Kieran Smallwood MD at [address] [place] [address]         Social History     Socioeconomic History    Marital status:      Spouse name: Not on file    Number of children: 2    Years of education: Not on file    Highest education level: Not on file   Occupational History    Occupation: Retired     Employer: [employer] resource strain: Not on file  Food insecurity     Worry: Not on file     Inability: Not on file    Transportation needs     Medical: Not on file     Non-medical: Not on file   Tobacco Use    Smoking status: Former Smoker     Packs/day: 1.00     Years: 20.00     Pack years: 20.00     Types: Cigarettes     Quit date: 2019     Years since quittin.5    Smokeless tobacco: Never Used   Substance and Sexual Activity    Alcohol use: No     Alcohol/week: 0.0 standard drinks     Comment: hx alcoholism. (has had alcohol in last 2 months)    Drug use: No    Sexual activity: Not Currently   Lifestyle    Physical activity     Days per week: Not on file     Minutes per session: Not on file    Stress: Not on file   Relationships    Social connections     Talks on phone: Not on file     Gets together: Not on file     Attends Confucianist service: Not on file     Active member of club or organization: Not on file     Attends meetings of clubs or organizations: Not on file     Relationship status: Not on file    Intimate partner violence     Fear of current or ex partner: Not on file     Emotionally abused: Not on file     Physically abused: Not on file     Forced sexual activity: Not on file   Other Topics Concern    Not on file   Social History Narrative    Lives at [address] [facility].        Family History   Problem Relation Age of Onset    Cancer Mother         Bone Cancer    Cancer Father         Lung cancer    Retinal Detachment Brother          Current Outpatient Medications:     furosemide (LASIX) 40 MG tablet, Take 1 tablet by mouth daily for 5 days, Disp: 5 tablet, Rfl: 0    potassium chloride (KLOR-CON M) 20 MEQ extended release tablet, Take 1 tablet by mouth daily for 5 days, Disp: 5 tablet, Rfl: 0    Wound Dressings (MEDIHONEY WOUND/BURN DRESSING) GEL gel, Apply 1 each topically daily, Disp: 1 Tube, Rfl: 2   neomycin-bacitracin-polymyxin (NEOSPORIN) 5-400-5000 ointment, Apply topically daily Apply topically 4 times daily. , Disp: , Rfl:     haloperidol (HALDOL) 0.5 MG tablet, nightly as needed , Disp: , Rfl:     nicotine polacrilex (NICORETTE) 2 MG gum, Take 2 mg by mouth every 4 hours (while awake), Disp: , Rfl:     levothyroxine (SYNTHROID) 75 MCG tablet, Take 1 tablet by mouth Daily, Disp: 90 tablet, Rfl: 1    QUEtiapine (SEROQUEL) 200 MG tablet, nightly , Disp: , Rfl:     QUEtiapine (SEROQUEL) 25 MG tablet, nightly , Disp: , Rfl:     Multiple Vitamins-Minerals (THERAPEUTIC MULTIVITAMIN-MINERALS) tablet, Take 1 tablet by mouth daily, Disp: , Rfl:     acetaminophen (TYLENOL) 325 MG tablet, Take 650 mg by mouth every 6 hours as needed for Pain, Disp: , Rfl:     calcium-vitamin D (OSCAL-500) 500-200 MG-UNIT per tablet, Take 1 tablet by mouth 2 times daily, Disp: , Rfl:     pravastatin (PRAVACHOL) 20 MG tablet, TAKE 1 TABLET BY MOUTH ONE TIME A DAY , Disp: 20 tablet, Rfl: 5    Codeine    Vitals:    02/05/21 1152   BP: 122/73   Site: Left Upper Arm   Position: Sitting   Cuff Size: Medium Adult   Height: 5' 1\" (1.549 m)       Admission on 01/13/2021, Discharged on 01/13/2021   Component Date Value Ref Range Status    SARS-CoV-2, NAAT 01/13/2021 Not Detected  Not Detected Final    Comment: Rapid NAAT:   Negative results should be treated as presumptive and,  if inconsistent with clinical signs and symptoms or necessary for  patient management, should be tested with an alternative molecular  assay. Negative results do not preclude SARS-CoV-2 infection and  should not be used as the sole basis for patient management decisions. This test has been authorized by the FDA under an Emergency Use  Authorization (EUA) for use by authorized laboratories.     Fact sheet for Healthcare Providers:  BuildHer.es  Fact sheet for Patients: BuildHer.es Rt AT: not checked    Lt DP: biphasic  Lt PT: biphasic  Lt AT: not checked    MEASUREMENTS 12/1/2020:    RIGHT ANKLE: 20.8 cm   RIGHT CALF: 38.4 cm     LEFT ANKLE: 22.2 cm   LEFT CALF: 40.0 cm    Pulmonary:      Effort: Pulmonary effort is normal. No tachypnea, accessory muscle usage or respiratory distress. Breath sounds: Normal breath sounds. No stridor. No wheezing or rales. Chest:       Abdominal:      General: Bowel sounds are normal. There is no distension. Palpations: Abdomen is soft. There is no mass. Tenderness: There is no abdominal tenderness. There is no guarding or rebound. Hernia: No hernia is present. There is no hernia in the ventral area or left inguinal area. Genitourinary:     Comments: Rectal exam/stool guaiac not indicated. Musculoskeletal: Normal range of motion. General: No tenderness. Right shoulder: She exhibits normal range of motion, no deformity and no pain. Legs:       Comments:   1/29/2021  3.1 cm long x 2.7 wide    1/19/2021    Opening of wound on left shin/calf:  4 cm long x 2.5 cm wide   Lymphadenopathy:      Head:      Right side of head: No submandibular, preauricular, posterior auricular or occipital adenopathy. Left side of head: No submandibular, preauricular, posterior auricular or occipital adenopathy. Cervical: No cervical adenopathy. Right cervical: No superficial, deep or posterior cervical adenopathy. Left cervical: No superficial, deep or posterior cervical adenopathy. Upper Body:      Right upper body: No supraclavicular or pectoral adenopathy. Left upper body: No supraclavicular or pectoral adenopathy. Skin:     General: Skin is warm and dry. Coloration: Skin is not pale. Findings: No bruising, erythema, laceration, lesion or rash. Neurological:      Mental Status: She is alert and oriented to person, place, and time. Cranial Nerves: No cranial nerve deficit. Sensory: No sensory deficit. Motor: No atrophy or abnormal muscle tone. Coordination: Coordination normal.      Gait: Gait normal.      Deep Tendon Reflexes: Reflexes are normal and symmetric. Psychiatric:         Speech: Speech normal.         Behavior: Behavior normal.         Thought Content: Thought content normal.         Judgment: Judgment normal.           ASSESSMENT:    Problem List Items Addressed This Visit     Chronic ulcer of left leg with fat layer exposed ([address] - Primary        Leg wound sharply and excisionally debrided removing subcutaneous tissue, 1.2 cm LONGx 3.2 cm WIDE, using   greenstick curette was used to excise subcutaneous debris. Remaining sutures were removed. After debridement got some reasonable bleeding consistent with healthy tissue and part granulation tissue. Controlled with pressure patient tolerated well with no anesthetic    PLAN:    Continue using medihoney and gauze on the wound, either covering with gauze or gauze and an ace wrap, with the ace wrap going to the top of the calf. Coretta beebe shower between dressing changes. Return in one week for left leg wound check. I Vernette Goldberg, MA am scribing for and in the presence of Collin Sue MD on this date of 02/05/21 at 12:07 PM    I Altaf Richardson MD personally performed the services described in this documentation as scribed by the Medical Assistant Keagan Beebe in my presence and it is both accurate and complete.         Electronically signed by Collin Sue MD on 2/5/2021 at 12:07 PM

## 2021-02-05 NOTE — PATIENT INSTRUCTIONS
Follow up in one week. Continue using medihoney and gauze on the wound, either covering with gauze or gauze and an ace wrap, with the ace wrap going to the top of the calf. Pat Levers may shower between dressing changes.

## 2021-02-12 ENCOUNTER — OFFICE VISIT (OUTPATIENT)
Dept: SURGERY | Age: 76
End: 2021-02-12

## 2021-02-12 VITALS — SYSTOLIC BLOOD PRESSURE: 118 MMHG | DIASTOLIC BLOOD PRESSURE: 69 MMHG | HEIGHT: 61 IN | BODY MASS INDEX: 32.12 KG/M2

## 2021-02-12 DIAGNOSIS — L97.922 CHRONIC ULCER OF LEFT LEG WITH FAT LAYER EXPOSED (HCC): ICD-10-CM

## 2021-02-12 DIAGNOSIS — T14.8XXA NONHEALING NONSURGICAL WOUND WITH FAT LAYER EXPOSED: ICD-10-CM

## 2021-02-12 DIAGNOSIS — Z09 POSTOP CHECK: Primary | ICD-10-CM

## 2021-02-12 PROCEDURE — 99024 POSTOP FOLLOW-UP VISIT: CPT | Performed by: SURGERY

## 2021-02-12 ASSESSMENT — ENCOUNTER SYMPTOMS
RESPIRATORY NEGATIVE: 1
ALLERGIC/IMMUNOLOGIC NEGATIVE: 1
GASTROINTESTINAL NEGATIVE: 1
EYES NEGATIVE: 1

## 2021-02-12 NOTE — PROGRESS NOTES
Daily Progress Note   Napoleon Negron MD      2/12/2021    Chief Complaint   Patient presents with    Wound Check     1 week f/u. pt states wound is healing. HISTORY OF PRESENT ILLNESS:                The patient is a 77 y.o. female who presents with a one week follow up for left leg wound check. Past Medical History:   Diagnosis Date    Alcoholic ([address]     Alcoholism ([address]     Allergic rhinitis     Arthritis     Bipolar 1 disorder ([address]     Bipolar affective disorder, manic ([address]     Cancer ([address] 1999     left breast; lumpectomy    Chronic back pain     Chronic ulcer of left leg with fat layer exposed ([address] 12/1/2020    Suspect this is a skin cancer    Colonoscopy refused 9/12/2012    Dysphagia     Hyperlipidemia     Hypotension     Hypothyroidism     Mild cognitive impairment     Nicotine dependence     Osteoporosis 5/20/2011    Peripheral edema     Prolonged QT interval     Repeated falls     Stress incontinence     Subdural hematoma ([address] 2009    Vitamin D deficiency        Past Surgical History:   Procedure Laterality Date    BREAST BIOPSY  1998    BREAST LUMPECTOMY  1999    Left,  no problems.     LEG BIOPSY EXCISION Left 1/13/2021    SURGICAL EXCISION LEFT LOWER LEG BASAL CELL CARCINOMA performed by Abraham Quezada MD at [address] [place] [address]         Social History     Socioeconomic History    Marital status:      Spouse name: Not on file    Number of children: 2    Years of education: Not on file    Highest education level: Not on file   Occupational History    Occupation: Retired     Employer: [employer]   Social Needs    Financial resource strain: Not on file    Food insecurity     Worry: Not on file     Inability: Not on file   [employer] needs     Medical: Not on file     Non-medical: Not on file   Tobacco Use    Smoking status: Former Smoker     Packs/day: 1.00     Years: 20.00     Pack years: 20.00 Musculoskeletal: Positive for gait problem (uses a walker). Skin: Positive for wound (left lower extremity). Allergic/Immunologic: Negative. Hematological: Negative. Psychiatric/Behavioral: Negative. All other systems reviewed and are negative. Physical Exam  Vitals signs and nursing note reviewed. Constitutional:       General: She is not in acute distress. Appearance: Normal appearance. She is well-developed. She is not ill-appearing or toxic-appearing. HENT:      Head: Normocephalic and atraumatic. Right Ear: External ear normal.      Left Ear: External ear normal.      Mouth/Throat:      Pharynx: No oropharyngeal exudate. Eyes:      General: No scleral icterus. Conjunctiva/sclera: Conjunctivae normal.      Pupils: Pupils are equal, round, and reactive to light. Neck:      Musculoskeletal: Normal range of motion and neck supple. No edema or erythema. Thyroid: No thyromegaly. Vascular: Normal carotid pulses. No carotid bruit or JVD. Trachea: No tracheal deviation. Cardiovascular:      Rate and Rhythm: Normal rate and regular rhythm. Pulses:           Carotid pulses are 2+ on the right side and 2+ on the left side. Radial pulses are 2+ on the right side and 2+ on the left side. Femoral pulses are 2+ on the right side and 2+ on the left side. Dorsalis pedis pulses are 0 on the right side and 0 on the left side. Posterior tibial pulses are 1+ on the right side and 2+ on the left side. Heart sounds: Normal heart sounds and S1 normal. No murmur.       Comments:     Doppler 12/1/2020:  Rt DP: monophasic  Rt PT:monophasic (stronger)   Rt AT: not checked    Lt DP: biphasic  Lt PT: biphasic  Lt AT: not checked    MEASUREMENTS 12/1/2020:    RIGHT ANKLE: 20.8 cm   RIGHT CALF: 38.4 cm     LEFT ANKLE: 22.2 cm   LEFT CALF: 40.0 cm    Pulmonary: Effort: Pulmonary effort is normal. No tachypnea, accessory muscle usage or respiratory distress. Breath sounds: Normal breath sounds. No stridor. No wheezing or rales. Chest:       Abdominal:      General: Bowel sounds are normal. There is no distension. Palpations: Abdomen is soft. There is no mass. Tenderness: There is no abdominal tenderness. There is no guarding or rebound. Hernia: No hernia is present. There is no hernia in the ventral area or left inguinal area. Genitourinary:     Comments: Rectal exam/stool guaiac not indicated. Musculoskeletal: Normal range of motion. General: No tenderness. Right shoulder: She exhibits normal range of motion, no deformity and no pain. Legs:       Comments:     2/12/21  2.2 cm long x 1.1 cm wide    1/29/2021  3.1 cm long x 2.7 wide    1/19/2021    Opening of wound on left shin/calf:  4 cm long x 2.5 cm wide   Lymphadenopathy:      Head:      Right side of head: No submandibular, preauricular, posterior auricular or occipital adenopathy. Left side of head: No submandibular, preauricular, posterior auricular or occipital adenopathy. Cervical: No cervical adenopathy. Right cervical: No superficial, deep or posterior cervical adenopathy. Left cervical: No superficial, deep or posterior cervical adenopathy. Upper Body:      Right upper body: No supraclavicular or pectoral adenopathy. Left upper body: No supraclavicular or pectoral adenopathy. Skin:     General: Skin is warm and dry. Coloration: Skin is not pale. Findings: No bruising, erythema, laceration, lesion or rash. Neurological:      Mental Status: She is alert and oriented to person, place, and time. Cranial Nerves: No cranial nerve deficit. Sensory: No sensory deficit. Motor: No atrophy or abnormal muscle tone.       Coordination: Coordination normal.      Gait: Gait normal. Deep Tendon Reflexes: Reflexes are normal and symmetric. Psychiatric:         Speech: Speech normal.         Behavior: Behavior normal.         Thought Content: Thought content normal.         Judgment: Judgment normal.           ASSESSMENT:    Problem List Items Addressed This Visit     Nonhealing nonsurgical wound with fat layer exposed    Chronic ulcer of left leg with fat layer exposed ([address]      Other Visit Diagnoses     Postop check    -  Primary        Excisional debridement of left medial leg site of basal cell carcinoma done with no local anesthesia with a sharp curette debriding skin scab and subcu tissue now measuring 2.2 x 1.1 cm bleeding controlled with pressure Medihoney applied with a Curlex wrap patient tolerated procedure well no complaints of pain  PLAN:    Return in one week for wound check. Paresh Nieves MA am scribing for and in the presence of Anahy Osorio MD on this date of 02/12/21    I Mich Garcia MD personally performed the services described in this documentation as scribed by the Medical Assistant Gordon Johnson in my presence and it is both accurate and complete.         Electronically signed by Anahy Osorio MD on 2/12/2021 at 2:32 PM

## 2021-02-19 ENCOUNTER — OFFICE VISIT (OUTPATIENT)
Dept: SURGERY | Age: 76
End: 2021-02-19

## 2021-02-19 VITALS — HEIGHT: 61 IN | DIASTOLIC BLOOD PRESSURE: 77 MMHG | BODY MASS INDEX: 32.12 KG/M2 | SYSTOLIC BLOOD PRESSURE: 135 MMHG

## 2021-02-19 DIAGNOSIS — T14.8XXA NONHEALING NONSURGICAL WOUND WITH FAT LAYER EXPOSED: ICD-10-CM

## 2021-02-19 DIAGNOSIS — C44.719 BASAL CELL CARCINOMA (BCC) OF LEFT LOWER EXTREMITY: ICD-10-CM

## 2021-02-19 DIAGNOSIS — L97.922 CHRONIC ULCER OF LEFT LEG WITH FAT LAYER EXPOSED (HCC): Primary | ICD-10-CM

## 2021-02-19 PROCEDURE — 99024 POSTOP FOLLOW-UP VISIT: CPT | Performed by: SURGERY

## 2021-02-19 NOTE — PROGRESS NOTES
Daily Progress Note   Amaris Gutierrez MD      2/19/2021    Chief Complaint   Patient presents with    Wound Check     LLE ankle no longer using walker. HISTORY OF PRESENT ILLNESS:                The patient is a 77 y.o. female who presents with a one week follow up for LLE wound check. Elliott Palencia says she is doing well. She has no complaints    Past Medical History:   Diagnosis Date    Alcoholic ([address]     Alcoholism ([address]     Allergic rhinitis     Arthritis     Bipolar 1 disorder ([address]     Bipolar affective disorder, manic ([address]     Cancer ([address] 1999     left breast; lumpectomy    Chronic back pain     Chronic ulcer of left leg with fat layer exposed ([address] 12/1/2020    Suspect this is a skin cancer    Colonoscopy refused 9/12/2012    Dysphagia     Hyperlipidemia     Hypotension     Hypothyroidism     Mild cognitive impairment     Nicotine dependence     Osteoporosis 5/20/2011    Peripheral edema     Prolonged QT interval     Repeated falls     Stress incontinence     Subdural hematoma ([address] 2009    Vitamin D deficiency        Past Surgical History:   Procedure Laterality Date    BREAST BIOPSY  1998    BREAST LUMPECTOMY  1999    Left,  no problems.     LEG BIOPSY EXCISION Left 1/13/2021    SURGICAL EXCISION LEFT LOWER LEG BASAL CELL CARCINOMA performed by Shahnaz Delgado MD at [address] [place] [address]         Social History     Socioeconomic History    Marital status:      Spouse name: Not on file    Number of children: 2    Years of education: Not on file    Highest education level: Not on file   Occupational History    Occupation: Retired     Employer: [employer]   Social Needs    Financial resource strain: Not on file    Food insecurity     Worry: Not on file     Inability: Not on file   [employer] needs     Medical: Not on file     Non-medical: Not on file   Tobacco Use    Smoking status: Former Smoker     Packs/day: 1.00 Years: 20.00     Pack years: 20.00     Types: Cigarettes     Quit date: 2019     Years since quittin.5    Smokeless tobacco: Never Used   Substance and Sexual Activity    Alcohol use: No     Alcohol/week: 0.0 standard drinks     Comment: hx alcoholism. (has had alcohol in last 2 months)    Drug use: No    Sexual activity: Not Currently   Lifestyle    Physical activity     Days per week: Not on file     Minutes per session: Not on file    Stress: Not on file   Relationships    Social connections     Talks on phone: Not on file     Gets together: Not on file     Attends Baptism service: Not on file     Active member of club or organization: Not on file     Attends meetings of clubs or organizations: Not on file     Relationship status: Not on file    Intimate partner violence     Fear of current or ex partner: Not on file     Emotionally abused: Not on file     Physically abused: Not on file     Forced sexual activity: Not on file   Other Topics Concern    Not on file   Social History Narrative    Lives at [address] [place] [address]. Family History   Problem Relation Age of Onset    Cancer Mother         Bone Cancer    Cancer Father         Lung cancer    Retinal Detachment Brother          Current Outpatient Medications:     furosemide (LASIX) 40 MG tablet, Take 1 tablet by mouth daily for 5 days, Disp: 5 tablet, Rfl: 0    potassium chloride (KLOR-CON M) 20 MEQ extended release tablet, Take 1 tablet by mouth daily for 5 days, Disp: 5 tablet, Rfl: 0    Wound Dressings ([facility] WOUND/BURN DRESSING) GEL gel, Apply 1 each topically daily, Disp: 1 Tube, Rfl: 2    neomycin-bacitracin-polymyxin (NEOSPORIN) 5-400-5000 ointment, Apply topically daily Apply topically 4 times daily. , Disp: , Rfl:     haloperidol (HALDOL) 0.5 MG tablet, nightly as needed , Disp: , Rfl:     nicotine polacrilex (NICORETTE) 2 MG gum, Take 2 mg by mouth every 4 hours (while awake), Disp: , Rfl:   levothyroxine (SYNTHROID) 75 MCG tablet, Take 1 tablet by mouth Daily, Disp: 90 tablet, Rfl: 1    QUEtiapine (SEROQUEL) 200 MG tablet, nightly , Disp: , Rfl:     QUEtiapine (SEROQUEL) 25 MG tablet, nightly , Disp: , Rfl:     Multiple Vitamins-Minerals (THERAPEUTIC MULTIVITAMIN-MINERALS) tablet, Take 1 tablet by mouth daily, Disp: , Rfl:     acetaminophen (TYLENOL) 325 MG tablet, Take 650 mg by mouth every 6 hours as needed for Pain, Disp: , Rfl:     calcium-vitamin D (OSCAL-500) 500-200 MG-UNIT per tablet, Take 1 tablet by mouth 2 times daily, Disp: , Rfl:     pravastatin (PRAVACHOL) 20 MG tablet, TAKE 1 TABLET BY MOUTH ONE TIME A DAY , Disp: 20 tablet, Rfl: 5    Codeine    Vitals:    02/19/21 1415   BP: 135/77   Site: Left Upper Arm   Position: Sitting   Cuff Size: Medium Adult   Height: 5' 1\" (1.549 m)       Admission on 01/13/2021, Discharged on 01/13/2021   Component Date Value Ref Range Status    SARS-CoV-2, NAAT 01/13/2021 Not Detected  Not Detected Final    Comment: Rapid NAAT:   Negative results should be treated as presumptive and,  if inconsistent with clinical signs and symptoms or necessary for  patient management, should be tested with an alternative molecular  assay. Negative results do not preclude SARS-CoV-2 infection and  should not be used as the sole basis for patient management decisions. This test has been authorized by the FDA under an Emergency Use  Authorization (EUA) for use by authorized laboratories. Fact sheet for Healthcare Providers:  Ankit.es  Fact sheet for Patients: Ankit.es    METHODOLOGY: Isothermal Nucleic Acid Amplification         Review of Systems   Constitutional: Negative. HENT: Negative. Eyes: Negative. Respiratory: Negative. Cardiovascular: Negative. Gastrointestinal: Negative. Endocrine: Negative. Genitourinary: Negative. Musculoskeletal: Positive for gait problem (uses a walker). Skin: Positive for wound (left lower extremity). Allergic/Immunologic: Negative. Hematological: Negative. Psychiatric/Behavioral: Negative. All other systems reviewed and are negative. Physical Exam  Vitals signs and nursing note reviewed. Constitutional:       General: She is not in acute distress. Appearance: Normal appearance. She is well-developed. She is not ill-appearing or toxic-appearing. HENT:      Head: Normocephalic and atraumatic. Right Ear: External ear normal.      Left Ear: External ear normal.      Mouth/Throat:      Pharynx: No oropharyngeal exudate. Eyes:      General: No scleral icterus. Conjunctiva/sclera: Conjunctivae normal.      Pupils: Pupils are equal, round, and reactive to light. Neck:      Musculoskeletal: Normal range of motion and neck supple. No edema or erythema. Thyroid: No thyromegaly. Vascular: Normal carotid pulses. No carotid bruit or JVD. Trachea: No tracheal deviation. Cardiovascular:      Rate and Rhythm: Normal rate and regular rhythm. Pulses:           Carotid pulses are 2+ on the right side and 2+ on the left side. Radial pulses are 2+ on the right side and 2+ on the left side. Femoral pulses are 2+ on the right side and 2+ on the left side. Dorsalis pedis pulses are 0 on the right side and 0 on the left side. Posterior tibial pulses are 1+ on the right side and 2+ on the left side. Heart sounds: Normal heart sounds and S1 normal. No murmur.       Comments:     Doppler 12/1/2020:  Rt DP: monophasic  Rt PT:monophasic (stronger)   Rt AT: not checked    Lt DP: biphasic  Lt PT: biphasic  Lt AT: not checked    MEASUREMENTS 12/1/2020:    RIGHT ANKLE: 20.8 cm   RIGHT CALF: 38.4 cm     LEFT ANKLE: 22.2 cm   LEFT CALF: 40.0 cm    Pulmonary: Effort: Pulmonary effort is normal. No tachypnea, accessory muscle usage or respiratory distress. Breath sounds: Normal breath sounds. No stridor. No wheezing or rales. Chest:       Abdominal:      General: Bowel sounds are normal. There is no distension. Palpations: Abdomen is soft. There is no mass. Tenderness: There is no abdominal tenderness. There is no guarding or rebound. Hernia: No hernia is present. There is no hernia in the ventral area or left inguinal area. Genitourinary:     Comments: Rectal exam/stool guaiac not indicated. Musculoskeletal: Normal range of motion. General: No tenderness. Right shoulder: She exhibits normal range of motion, no deformity and no pain. Legs:       Comments: 2/19/2021  Two places now, space in between:   1 cm long, x 2 mm wide  2.2 x 1     2/12/21  2.2 cm long x 1.1 cm wide    1/29/2021  3.1 cm long x 2.7 wide    1/19/2021    Opening of wound on left shin/calf:  4 cm long x 2.5 cm wide   Lymphadenopathy:      Head:      Right side of head: No submandibular, preauricular, posterior auricular or occipital adenopathy. Left side of head: No submandibular, preauricular, posterior auricular or occipital adenopathy. Cervical: No cervical adenopathy. Right cervical: No superficial, deep or posterior cervical adenopathy. Left cervical: No superficial, deep or posterior cervical adenopathy. Upper Body:      Right upper body: No supraclavicular or pectoral adenopathy. Left upper body: No supraclavicular or pectoral adenopathy. Skin:     General: Skin is warm and dry. Coloration: Skin is not pale. Findings: No bruising, erythema, laceration, lesion or rash. Neurological:      Mental Status: She is alert and oriented to person, place, and time. Cranial Nerves: No cranial nerve deficit. Sensory: No sensory deficit. Motor: No atrophy or abnormal muscle tone. Coordination: Coordination normal.      Gait: Gait normal.      Deep Tendon Reflexes: Reflexes are normal and symmetric. Psychiatric:         Speech: Speech normal.         Behavior: Behavior normal.         Thought Content: Thought content normal.         Judgment: Judgment normal.           ASSESSMENT:    Problem List Items Addressed This Visit     Nonhealing nonsurgical wound with fat layer exposed    Chronic ulcer of left leg with fat layer exposed ([address] - Primary    Basal cell carcinoma (BCC) of left lower extremity        Skin and superficial dried up Medihoney debrided to subcutaneous Vicryl stitches removed patient tolerated well Medihoney reapplied  PLAN:    Return in two weeks for a left lower extremity wound check. Hopefully she is healed in 2 weeks      I Megan Johnson MA am scribing for and in the presence of Chico Cabrera MD on this date of 02/19/21    I Aj Cee MD personally performed the services described in this documentation as scribed by the Medical Assistant Megan Johnson in my presence and it is both accurate and complete.         Electronically signed by Chico Cabrera MD on 2/19/2021 at 4:43 PM

## 2021-03-05 ENCOUNTER — OFFICE VISIT (OUTPATIENT)
Dept: SURGERY | Age: 76
End: 2021-03-05

## 2021-03-05 VITALS — SYSTOLIC BLOOD PRESSURE: 115 MMHG | WEIGHT: 170 LBS | DIASTOLIC BLOOD PRESSURE: 62 MMHG | BODY MASS INDEX: 32.12 KG/M2

## 2021-03-05 DIAGNOSIS — M79.9 LESION OF SOFT TISSUE OF LOWER LEG AND ANKLE: ICD-10-CM

## 2021-03-05 DIAGNOSIS — Z51.89 VISIT FOR WOUND CHECK: Primary | ICD-10-CM

## 2021-03-05 PROCEDURE — 99024 POSTOP FOLLOW-UP VISIT: CPT | Performed by: SURGERY

## 2021-03-05 NOTE — PROGRESS NOTES
 Transportation needs     Medical: Not on file     Non-medical: Not on file   Tobacco Use    Smoking status: Former Smoker     Packs/day: 1.00     Years: 20.00     Pack years: 20.00     Types: Cigarettes     Quit date: 2019     Years since quittin.6    Smokeless tobacco: Never Used   Substance and Sexual Activity    Alcohol use: No     Alcohol/week: 0.0 standard drinks     Comment: hx alcoholism. (has had alcohol in last 2 months)    Drug use: No    Sexual activity: Not Currently   Lifestyle    Physical activity     Days per week: Not on file     Minutes per session: Not on file    Stress: Not on file   Relationships    Social connections     Talks on phone: Not on file     Gets together: Not on file     Attends Muslim service: Not on file     Active member of club or organization: Not on file     Attends meetings of clubs or organizations: Not on file     Relationship status: Not on file    Intimate partner violence     Fear of current or ex partner: Not on file     Emotionally abused: Not on file     Physically abused: Not on file     Forced sexual activity: Not on file   Other Topics Concern    Not on file   Social History Narrative    L[facility] at [facility]. Family History   Problem Relation Age of Onset    Cancer Mother         Bone Cancer    Cancer Father         Lung cancer    Retinal Detachment Brother          Current Outpatient Medications:     Wound Dressings (MEDIHONEY WOUND/BURN DRESSING) GEL gel, Apply 1 each topically daily, Disp: 1 Tube, Rfl: 2    neomycin-bacitracin-polymyxin (NEOSPORIN) 5-400-5000 ointment, Apply topically daily Apply topically 4 times daily. , Disp: , Rfl:     haloperidol (HALDOL) 0.5 MG tablet, nightly as needed , Disp: , Rfl:     nicotine polacrilex (NICORETTE) 2 MG gum, Take 2 mg by mouth every 4 hours (while awake), Disp: , Rfl:     levothyroxine (SYNTHROID) 75 MCG tablet, Take 1 tablet by mouth Daily, Disp: 90 tablet, Rfl: 1   QUEtiapine (SEROQUEL) 200 MG tablet, nightly , Disp: , Rfl:     QUEtiapine (SEROQUEL) 25 MG tablet, nightly , Disp: , Rfl:     Multiple Vitamins-Minerals (THERAPEUTIC MULTIVITAMIN-MINERALS) tablet, Take 1 tablet by mouth daily, Disp: , Rfl:     acetaminophen (TYLENOL) 325 MG tablet, Take 650 mg by mouth every 6 hours as needed for Pain, Disp: , Rfl:     calcium-vitamin D (OSCAL-500) 500-200 MG-UNIT per tablet, Take 1 tablet by mouth 2 times daily, Disp: , Rfl:     pravastatin (PRAVACHOL) 20 MG tablet, TAKE 1 TABLET BY MOUTH ONE TIME A DAY , Disp: 20 tablet, Rfl: 5    furosemide (LASIX) 40 MG tablet, Take 1 tablet by mouth daily for 5 days, Disp: 5 tablet, Rfl: 0    potassium chloride (KLOR-CON M) 20 MEQ extended release tablet, Take 1 tablet by mouth daily for 5 days, Disp: 5 tablet, Rfl: 0    Codeine    Vitals:    03/05/21 1423   BP: 115/62   Weight: 170 lb (77.1 kg)       Admission on 01/13/2021, Discharged on 01/13/2021   Component Date Value Ref Range Status    SARS-CoV-2, NAAT 01/13/2021 Not Detected  Not Detected Final    Comment: Rapid NAAT:   Negative results should be treated as presumptive and,  if inconsistent with clinical signs and symptoms or necessary for  patient management, should be tested with an alternative molecular  assay. Negative results do not preclude SARS-CoV-2 infection and  should not be used as the sole basis for patient management decisions. This test has been authorized by the FDA under an Emergency Use  Authorization (EUA) for use by authorized laboratories. Fact sheet for Healthcare Providers:  BuildHer.es  Fact sheet for Patients: BuildHer.es    METHODOLOGY: Isothermal Nucleic Acid Amplification         Review of Systems   Constitutional: Negative. HENT: Negative. Eyes: Negative. Respiratory: Negative. Cardiovascular: Negative. Gastrointestinal: Negative. Endocrine: Negative. Effort: Pulmonary effort is normal. No tachypnea, accessory muscle usage or respiratory distress. Breath sounds: Normal breath sounds. No stridor. No wheezing or rales. Chest:       Abdominal:      General: Bowel sounds are normal. There is no distension. Palpations: Abdomen is soft. There is no mass. Tenderness: There is no abdominal tenderness. There is no guarding or rebound. Hernia: No hernia is present. There is no hernia in the ventral area or left inguinal area. Genitourinary:     Comments: Rectal exam/stool guaiac not indicated. Musculoskeletal: Normal range of motion. General: No tenderness. Right shoulder: She exhibits normal range of motion, no deformity and no pain. Legs:       Comments: 2/19/2021  Two places now, space in between:   1 cm long, x 2 mm wide  2.2 x 1     2/12/21  2.2 cm long x 1.1 cm wide    1/29/2021  3.1 cm long x 2.7 wide    1/19/2021    Opening of wound on left shin/calf:  4 cm long x 2.5 cm wide   Lymphadenopathy:      Head:      Right side of head: No submandibular, preauricular, posterior auricular or occipital adenopathy. Left side of head: No submandibular, preauricular, posterior auricular or occipital adenopathy. Cervical: No cervical adenopathy. Right cervical: No superficial, deep or posterior cervical adenopathy. Left cervical: No superficial, deep or posterior cervical adenopathy. Upper Body:      Right upper body: No supraclavicular or pectoral adenopathy. Left upper body: No supraclavicular or pectoral adenopathy. Skin:     General: Skin is warm and dry. Coloration: Skin is not pale. Findings: No bruising, erythema, laceration, lesion or rash. Neurological:      Mental Status: She is alert and oriented to person, place, and time. Cranial Nerves: No cranial nerve deficit. Sensory: No sensory deficit. Motor: No atrophy or abnormal muscle tone. Coordination: Coordination normal.      Gait: Gait normal.      Deep Tendon Reflexes: Reflexes are normal and symmetric. Psychiatric:         Speech: Speech normal.         Behavior: Behavior normal.         Thought Content: Thought content normal.         Judgment: Judgment normal.           ASSESSMENT:    Problem List Items Addressed This Visit     None      Visit Diagnoses     Visit for wound check    -  Primary    Lesion of soft tissue of lower leg and ankle              PLAN:  Happy to tell her that she is completely healed her son was with her today so they will come back as needed  Return as needed. Cristel Brady MA am scribing for and in the presence of Nikolai Hastings MD on this date of 03/05/21    I Elias Fay MD personally performed the services described in this documentation as scribed by the Medical Assistant Luis Johnson in my presence and it is both accurate and complete.         Electronically signed by Nikolai Hastings MD on 3/5/2021 at 2:37 PM

## 2021-05-19 ENCOUNTER — TELEPHONE (OUTPATIENT)
Dept: FAMILY MEDICINE CLINIC | Age: 76
End: 2021-05-19

## 2021-05-19 ENCOUNTER — OFFICE VISIT (OUTPATIENT)
Dept: FAMILY MEDICINE CLINIC | Age: 76
End: 2021-05-19
Payer: MEDICARE

## 2021-05-19 VITALS
BODY MASS INDEX: 31.34 KG/M2 | WEIGHT: 166 LBS | DIASTOLIC BLOOD PRESSURE: 78 MMHG | HEIGHT: 61 IN | SYSTOLIC BLOOD PRESSURE: 118 MMHG | OXYGEN SATURATION: 95 %

## 2021-05-19 DIAGNOSIS — E03.9 HYPOTHYROIDISM (ACQUIRED): ICD-10-CM

## 2021-05-19 DIAGNOSIS — Z00.00 ROUTINE GENERAL MEDICAL EXAMINATION AT A HEALTH CARE FACILITY: Primary | ICD-10-CM

## 2021-05-19 DIAGNOSIS — M81.0 SENILE OSTEOPOROSIS: ICD-10-CM

## 2021-05-19 DIAGNOSIS — J42 CHRONIC BRONCHITIS, UNSPECIFIED CHRONIC BRONCHITIS TYPE (HCC): ICD-10-CM

## 2021-05-19 DIAGNOSIS — Z12.11 SCREEN FOR COLON CANCER: ICD-10-CM

## 2021-05-19 PROCEDURE — 4040F PNEUMOC VAC/ADMIN/RCVD: CPT | Performed by: FAMILY MEDICINE

## 2021-05-19 PROCEDURE — 3017F COLORECTAL CA SCREEN DOC REV: CPT | Performed by: FAMILY MEDICINE

## 2021-05-19 PROCEDURE — G0439 PPPS, SUBSEQ VISIT: HCPCS | Performed by: FAMILY MEDICINE

## 2021-05-19 PROCEDURE — 1123F ACP DISCUSS/DSCN MKR DOCD: CPT | Performed by: FAMILY MEDICINE

## 2021-05-19 RX ORDER — IBANDRONATE SODIUM 150 MG/1
150 TABLET, FILM COATED ORAL
Qty: 30 TABLET | Refills: 11 | Status: SHIPPED | OUTPATIENT
Start: 2021-05-19

## 2021-05-19 RX ORDER — ZOSTER VACCINE RECOMBINANT, ADJUVANTED 50 MCG/0.5
0.5 KIT INTRAMUSCULAR SEE ADMIN INSTRUCTIONS
Qty: 0.5 ML | Refills: 0 | Status: SHIPPED | OUTPATIENT
Start: 2021-05-19 | End: 2021-11-15

## 2021-05-19 ASSESSMENT — PATIENT HEALTH QUESTIONNAIRE - PHQ9
SUM OF ALL RESPONSES TO PHQ QUESTIONS 1-9: 2
1. LITTLE INTEREST OR PLEASURE IN DOING THINGS: 1
SUM OF ALL RESPONSES TO PHQ9 QUESTIONS 1 & 2: 2
SUM OF ALL RESPONSES TO PHQ QUESTIONS 1-9: 2

## 2021-05-19 ASSESSMENT — LIFESTYLE VARIABLES: HOW OFTEN DO YOU HAVE A DRINK CONTAINING ALCOHOL: 0

## 2021-05-19 NOTE — PROGRESS NOTES
Medicare Annual Wellness Visit  Name: Nya Yao Date: 2021   MRN: [id number] Sex: Female   Age: 77 y.o. Ethnicity: Non-/Non    : 1945 Race: Mackenzie Pino is here for Medicare AWV (AWV)    Screenings for behavioral, psychosocial and functional/safety risks, and cognitive dysfunction are all negative except as indicated below. These results, as well as other patient data from the [address] form, are documented in Flowsheets linked to this Encounter. Here with her Brother, Cora Carmichael. Living at [facility] still. Covid has been a challenge- less active and less socialization. But she has her vaccine and now has greater capacity for getting out with her brother and her son. Hx bipolar 1 and is managed by DR Harriet Chawla. She is on seroquel and haldol. She gained a lot of weight on depakote and lithium. She is not happy to be at the [facility] but is not hallucinating and is not in [place] currently. No thoughts of self harm. she still gets mammograms done- done recently. Prior cancer without recurrence. Had [address] removed from her left leg, healing well. She uses a walker and has not had any falls. She is not smoking. But wants to. Her brother does not buy her cigarettes and she cannot safely get to the smoking area at [address]. She is using nicorette gum but not nicoderm. Her cravings have not lessened after 6-8 months. She was sneaking out to find cigarettes in past and has fallen in the past.    She used to weigh 106 lbs when she lived at [place], [address] says. She would smoke all day and not eat. Her leg edema last year I suspect was from malnutrition. She now does not have edema. She is on calcium+D for osteoporosis. Has refused reclast.  She would be open to [place] use. She has not had shingles vaccine. Takes synthroid for hypothyroid.   Lab Results   Component Value Date    TSHREFLEX 2.12 2021        Allergies   Allergen Reactions    Codeine        Prior to Visit Medications    Medication Sig Taking?  Authorizing Provider   haloperidol (HALDOL) 0.5 MG tablet nightly as needed  Yes Historical Provider, MD   nicotine polacrilex (NICORETTE) 2 MG gum Take 2 mg by mouth every 4 hours (while awake) Yes Historical Provider, MD   levothyroxine (SYNTHROID) 75 MCG tablet Take 1 tablet by mouth Daily Yes Gregory Maharaj MD   QUEtiapine (SEROQUEL) 200 MG tablet nightly  Yes Historical Provider, MD   QUEtiapine (SEROQUEL) 25 MG tablet nightly  Yes Historical Provider, MD   Multiple Vitamins-Minerals (THERAPEUTIC MULTIVITAMIN-MINERALS) tablet Take 1 tablet by mouth daily Yes Historical Provider, MD   acetaminophen (TYLENOL) 325 MG tablet Take 650 mg by mouth every 6 hours as needed for Pain Yes Historical Provider, MD   calcium-vitamin D (OSCAL-500) 500-200 MG-UNIT per tablet Take 1 tablet by mouth 2 times daily Yes Historical Provider, MD   pravastatin (PRAVACHOL) 20 MG tablet TAKE 1 TABLET BY MOUTH ONE TIME A DAY  Yes Gregory Maharaj MD   furosemide (LASIX) 40 MG tablet Take 1 tablet by mouth daily for 5 days  Gregory Maharaj MD   potassium chloride (KLOR-CON M) 20 MEQ extended release tablet Take 1 tablet by mouth daily for 5 days  Gregory Maharaj MD       Past Medical History:   Diagnosis Date    Alcoholic ([address]     Alcoholism ([facility]     Allergic rhinitis     Arthritis     Bipolar 1 disorder ([facility]     Bipolar affective disorder, manic ([facility]     Cancer ([facility] 1999     left breast; lumpectomy    Chronic back pain     Chronic ulcer of left leg with fat layer exposed ([address] 12/1/2020    Suspect this is a skin cancer    Colonoscopy refused 9/12/2012    Dysphagia     Hyperlipidemia     Hypotension     Hypothyroidism     Mild cognitive impairment     Nicotine dependence     Osteoporosis 5/20/2011    Peripheral edema     Prolonged QT interval     Repeated falls     Stress incontinence     Subdural hematoma [facility]) 2009    Vitamin D deficiency        Past Surgical History:   Procedure Laterality Date    BREAST BIOPSY  1998    BREAST LUMPECTOMY  1999    Left,  no problems.  LEG BIOPSY EXCISION Left 1/13/2021    SURGICAL EXCISION LEFT LOWER LEG BASAL CELL CARCINOMA performed by Nelson Sexton MD at [address] [place] [address]         Family History   Problem Relation Age of Onset    Cancer Mother         Bone Cancer    Cancer Father         Lung cancer    Retinal Detachment Brother        CareTeam: (Including outside providers/suppliers regularly involved in providing care):   Patient Care Team:  Jeane Lopez MD as PCP - Keisha Aviles MD as PCP - [facility]    Wt Readings from Last 3 Encounters:   05/19/21 166 lb (75.3 kg)   03/05/21 170 lb (77.1 kg)   01/13/21 170 lb (77.1 kg)     Vitals:    05/19/21 1100   BP: 118/78   SpO2: 95%   Weight: 166 lb (75.3 kg)   Height: 5' 1\" (1.549 m)     Body mass index is 31.37 kg/m². Based upon direct observation of the patient, evaluation of cognition reveals recent and remote memory intact. Patient's complete Health Risk Assessment and screening values have been reviewed and are found in Flowsheets. The following problems were reviewed today and where indicated follow up appointments were made and/or referrals ordered. Positive Risk Factor Screenings with Interventions:          General Health and ACP:  General  In general, how would you say your health is?: Good  In the past 7 days, have you experienced any of the following?  New or Increased Pain, New or Increased Fatigue, Loneliness, Social Isolation, Stress or Anger?: (!) Loneliness  Do you get the social and emotional support that you need?: Yes  Do you have a Living Will?: Yes  Advance Directives     Power of  Living Will ACP-Advance Directive ACP-Power of     Not on File [place] on 08/15/19 Filed Not on [address] [facility] Hypothyroidism (acquired)  - stable TSH on current dose. 5. Senile osteoporosis  - is on calcium/D. Declines reclast but agrees to use boniva monthly in support of bone density and fracture prevention. F/u 6 mo with Kali Johnson.

## 2021-05-19 NOTE — TELEPHONE ENCOUNTER
LOV 5.19.21    Mary Marc called from nursing home needs clarification on a prescription   The nicoderm patch

## 2021-05-19 NOTE — TELEPHONE ENCOUNTER
Spoke to Dion. She said it look like the Nicoderm patches says 2 mg. They don't make this strength.   Told her it is 7 mg/24hrs

## 2021-05-19 NOTE — PATIENT INSTRUCTIONS
Personalized Preventive Plan for Savi Love - 5/19/2021  Medicare offers a range of preventive health benefits. Some of the tests and screenings are paid in full while other may be subject to a deductible, co-insurance, and/or copay. Some of these benefits include a comprehensive review of your medical history including lifestyle, illnesses that may run in your family, and various assessments and screenings as appropriate. After reviewing your medical record and screening and assessments performed today your provider may have ordered immunizations, labs, imaging, and/or referrals for you. A list of these orders (if applicable) as well as your Preventive Care list are included within your After Visit Summary for your review. Other Preventive Recommendations:    · A preventive eye exam performed by an eye specialist is recommended every 1-2 years to screen for glaucoma; cataracts, macular degeneration, and other eye disorders. · A preventive dental visit is recommended every 6 months. · Try to get at least 150 minutes of exercise per week or 10,000 steps per day on a pedometer . · Order or download the FREE \"Exercise & Physical Activity: Your Everyday Guide\" from [employer]. Call 1-930.146.7466 or search [employer] online. · You need 6862-5010 mg of calcium and 7613-3644 IU of vitamin D per day. It is possible to meet your calcium requirement with diet alone, but a vitamin D supplement is usually necessary to meet this goal.  · When exposed to the sun, use a sunscreen that protects against both UVA and UVB radiation with an SPF of 30 or greater. Reapply every 2 to 3 hours or after sweating, drying off with a towel, or swimming. · Always wear a seat belt when traveling in a car. Always wear a helmet when riding a bicycle or motorcycle.

## 2021-06-24 ENCOUNTER — NURSE TRIAGE (OUTPATIENT)
Dept: OTHER | Facility: CLINIC | Age: 76
End: 2021-06-24

## 2021-06-24 NOTE — TELEPHONE ENCOUNTER
Reason for Disposition   Heart beating very rapidly (e.g., > 140 / minute) and not present now (Exception: during exercise)    Answer Assessment - Initial Assessment Questions  1. DESCRIPTION: \"Please describe your heart rate or heart beat that you are having\" (e.g., fast/slow, regular/irregular, skipped or extra beats, \"palpitations\")      Feels like her heart is racing    2. ONSET: \"When did it start? \" (Minutes, hours or days)       2 days ago gradually happens    3. DURATION: \"How long does it last\" (e.g., seconds, minutes, hours)     Few seconds    4. PATTERN \"Does it come and go, or has it been constant since it started? \"  \"Does it get worse with exertion? \"   \"Are you feeling it now? \"      Comes and goes with SOB    5. TAP: \"Using your hand, can you tap out what you are feeling on a chair or table in front of you, so that I can hear? \" (Note: not all patients can do this)       No    6. HEART RATE: \"Can you tell me your heart rate? \" \"How many beats in 15 seconds? \"  (Note: not all patients can do this)        No    7. RECURRENT SYMPTOM: \"Have you ever had this before? \" If so, ask: \"When was the last time? \" and \"What happened that time? \"       Yes    8. CAUSE: \"What do you think is causing the palpitations? \"      Thinks it is related to her thyroid level    9. CARDIAC HISTORY: \"Do you have any history of heart disease? \" (e.g., heart attack, angina, bypass surgery, angioplasty, arrhythmia)       No    10. OTHER SYMPTOMS: \"Do you have any other symptoms? \" (e.g., dizziness, chest pain, sweating, difficulty breathing)        SOB at times, dizzy when walking - just walked 1 mile    11. PREGNANCY: \"Is there any chance you are pregnant? \" \"When was your last menstrual period? \"       N/a    Protocols used: HEART RATE AND HEARTBEAT QUESTIONS-ADULT-[place]    Received call from Kael Vicente  at [facility] with Red Flag Complaint.     Brief description of triage: feels like heart is racing, thinks thyroid level may be low, has some SOB when this occurs over past 2 days    Triage indicates for patient to be seen in office today    Care advice provided, patient verbalizes understanding; denies any other questions or concerns; instructed to call back for any new or worsening symptoms. Writer provided warm transfer to Emory Rodriguez at [facility] for appointment scheduling. Attention Provider: Thank you for allowing me to participate in the care of your patient. The patient was connected to triage in response to information provided to the ECC. Please do not respond through this encounter as the response is not directed to a shared pool.

## 2021-07-13 ENCOUNTER — TELEPHONE (OUTPATIENT)
Dept: FAMILY MEDICINE CLINIC | Age: 76
End: 2021-07-13

## 2021-07-13 RX ORDER — MICONAZOLE NITRATE 20.6 MG/G
POWDER TOPICAL
Qty: 45 G | Refills: 1 | Status: SHIPPED | OUTPATIENT
Start: 2021-07-13 | End: 2021-11-29

## 2021-07-13 NOTE — TELEPHONE ENCOUNTER
Pt needs a powder called in verified pharmacy she has some redness underneath her breasts       Please advise

## 2021-07-13 NOTE — TELEPHONE ENCOUNTER
Ordered antifungal powder for serena to use. please notify [facility] staff. Thanks.

## 2021-07-13 NOTE — TELEPHONE ENCOUNTER
Attempted to contact Lemuel Irwin at [facility] but no answer.  Will try again

## 2021-07-14 NOTE — TELEPHONE ENCOUNTER
Called today, spoke to [employer]. She said they got the med last night. But needed the directions. Sent a fax with directions.

## 2021-11-29 ENCOUNTER — OFFICE VISIT (OUTPATIENT)
Dept: FAMILY MEDICINE CLINIC | Age: 76
End: 2021-11-29
Payer: MEDICAID

## 2021-11-29 VITALS
SYSTOLIC BLOOD PRESSURE: 114 MMHG | DIASTOLIC BLOOD PRESSURE: 80 MMHG | OXYGEN SATURATION: 95 % | BODY MASS INDEX: 32.66 KG/M2 | HEART RATE: 75 BPM | HEIGHT: 61 IN | WEIGHT: 173 LBS

## 2021-11-29 DIAGNOSIS — Z11.59 ENCOUNTER FOR HEPATITIS C SCREENING TEST FOR LOW RISK PATIENT: ICD-10-CM

## 2021-11-29 DIAGNOSIS — E03.9 HYPOTHYROIDISM (ACQUIRED): ICD-10-CM

## 2021-11-29 DIAGNOSIS — M81.0 SENILE OSTEOPOROSIS: ICD-10-CM

## 2021-11-29 DIAGNOSIS — E03.9 HYPOTHYROIDISM (ACQUIRED): Primary | ICD-10-CM

## 2021-11-29 DIAGNOSIS — E78.00 PURE HYPERCHOLESTEROLEMIA: ICD-10-CM

## 2021-11-29 DIAGNOSIS — J42 CHRONIC BRONCHITIS, UNSPECIFIED CHRONIC BRONCHITIS TYPE (HCC): ICD-10-CM

## 2021-11-29 DIAGNOSIS — F10.11 NONDEPENDENT ALCOHOL ABUSE, IN REMISSION: ICD-10-CM

## 2021-11-29 DIAGNOSIS — R29.6 FREQUENT FALLS: ICD-10-CM

## 2021-11-29 DIAGNOSIS — F17.200 TOBACCO USE DISORDER: ICD-10-CM

## 2021-11-29 PROBLEM — L97.922 CHRONIC ULCER OF LEFT LEG WITH FAT LAYER EXPOSED (HCC): Status: RESOLVED | Noted: 2020-12-01 | Resolved: 2021-11-29

## 2021-11-29 PROBLEM — T14.8XXA NONHEALING NONSURGICAL WOUND WITH FAT LAYER EXPOSED: Status: RESOLVED | Noted: 2021-01-19 | Resolved: 2021-11-29

## 2021-11-29 LAB
A/G RATIO: 3.1 (ref 1.1–2.2)
ALBUMIN SERPL-MCNC: 4.9 G/DL (ref 3.4–5)
ALP BLD-CCNC: 99 U/L (ref 40–129)
ALT SERPL-CCNC: 10 U/L (ref 10–40)
ANION GAP SERPL CALCULATED.3IONS-SCNC: 16 MMOL/L (ref 3–16)
AST SERPL-CCNC: 17 U/L (ref 15–37)
BILIRUB SERPL-MCNC: 0.6 MG/DL (ref 0–1)
BUN BLDV-MCNC: 15 MG/DL (ref 7–20)
CALCIUM SERPL-MCNC: 10 MG/DL (ref 8.3–10.6)
CHLORIDE BLD-SCNC: 101 MMOL/L (ref 99–110)
CHOLESTEROL, TOTAL: 162 MG/DL (ref 0–199)
CO2: 25 MMOL/L (ref 21–32)
CREAT SERPL-MCNC: 0.6 MG/DL (ref 0.6–1.2)
GFR AFRICAN AMERICAN: >60
GFR NON-AFRICAN AMERICAN: >60
GLUCOSE BLD-MCNC: 84 MG/DL (ref 70–99)
HDLC SERPL-MCNC: 63 MG/DL (ref 40–60)
HEPATITIS C ANTIBODY INTERPRETATION: NORMAL
LDL CHOLESTEROL CALCULATED: 77 MG/DL
POTASSIUM SERPL-SCNC: 4.1 MMOL/L (ref 3.5–5.1)
SODIUM BLD-SCNC: 142 MMOL/L (ref 136–145)
TOTAL PROTEIN: 6.5 G/DL (ref 6.4–8.2)
TRIGL SERPL-MCNC: 112 MG/DL (ref 0–150)
TSH SERPL DL<=0.05 MIU/L-ACNC: 0.96 UIU/ML (ref 0.27–4.2)
VLDLC SERPL CALC-MCNC: 22 MG/DL

## 2021-11-29 PROCEDURE — 99214 OFFICE O/P EST MOD 30 MIN: CPT | Performed by: FAMILY MEDICINE

## 2021-11-29 NOTE — PROGRESS NOTES
2021    Blood pressure 114/80, pulse 75, height 5' 1\" (1.549 m), weight 173 lb (78.5 kg), SpO2 95 %, not currently breastfeeding. Sera Horn (:  1945) is a 77 y.o. female, here for evaluation of the following medical concerns:    Chief Complaint   Patient presents with    Follow-up     Corinne Gourd is living at [facility]. Here with her brother. She has had covid booster, did not have infection. Residents and staff are tested frequently. Mental health care managed by DR Trisha Victoria at [facility] (psych). Tracy Garcia has gained weight and is unhappy about that. But wt is stable. Synthroid dose is 75,. Lab Results   Component Value Date    TSHREFLEX 2.12 2021       Lab Results   Component Value Date    TSH 13.29 2020    TSH 2.27 10/03/2019    TSH 0.05 2019      On prav for primary prevention. Last lipid test:  Lab Results   Component Value Date    CHOL 164 2019    TRIG 86 2019    HDL 66 (H) 2019    LDLCALC 81 2019     Lab Results   Component Value Date    ALT 11 2021    AST 18 2021     She is not fasting. She is on Boniva; declines further DEXA scanning. No falls. Uses walker.     Patient Active Problem List   Diagnosis    Hyperlipidemia    Thoracic vertebral fracture- T7 (2010)    B12 deficiency    KEE (stress urinary incontinence, female)    Hypothyroidism (acquired)    Wernicke's encephalopathy    Vitamin D deficiency    Nondependent alcohol abuse, in remission    Colonoscopy refused    Bipolar I disorder, current or most recent episode manic, in partial remission ([address]    Hx of breast cancer- , left, lumpectomy/ALND (Dr Moose Whitman)   [place] Frequent falls    Senile osteoporosis    Arthritis    Chronic bronchitis ([address]    Chronic ulcer of left leg with fat layer exposed ([address]    Basal cell carcinoma (BCC) of left lower extremity    Nonhealing nonsurgical wound with fat layer exposed        Body mass index is 32.69 kg/m². Wt Readings from Last 3 Encounters:   21 173 lb (78.5 kg)   21 166 lb (75.3 kg)   21 170 lb (77.1 kg)       BP Readings from Last 3 Encounters:   21 114/80   21 118/78   21 115/62       Allergies   Allergen Reactions    Codeine        Prior to Visit Medications    Medication Sig Taking? Authorizing Provider   ibandronate (BONIVA) 150 MG tablet Take 1 tablet by mouth every 30 days Yes Tj Mancia MD   haloperidol (HALDOL) 0.5 MG tablet nightly as needed  Yes Historical Provider, MD   nicotine polacrilex (NICORETTE) 2 MG gum Take 2 mg by mouth every 4 hours (while awake) Yes Historical Provider, MD   levothyroxine (SYNTHROID) 75 MCG tablet Take 1 tablet by mouth Daily Yes Tj Mancia MD   QUEtiapine (SEROQUEL) 200 MG tablet nightly  Yes Historical Provider, MD   QUEtiapine (SEROQUEL) 25 MG tablet nightly  Yes Historical Provider, MD   Multiple Vitamins-Minerals (THERAPEUTIC MULTIVITAMIN-MINERALS) tablet Take 1 tablet by mouth daily Yes Historical Provider, MD   acetaminophen (TYLENOL) 325 MG tablet Take 650 mg by mouth every 6 hours as needed for Pain Yes Historical Provider, MD   calcium-vitamin D (OSCAL-500) 500-200 MG-UNIT per tablet Take 1 tablet by mouth 2 times daily Yes Historical Provider, MD   pravastatin (PRAVACHOL) 20 MG tablet TAKE 1 TABLET BY MOUTH ONE TIME A DAY  Yes Tj Mancia MD        Social History     Tobacco Use    Smoking status: Former Smoker     Packs/day: 1.00     Years: 20.00     Pack years: 20.00     Types: Cigarettes     Quit date: 2019     Years since quittin.3    Smokeless tobacco: Never Used   Substance Use Topics    Alcohol use: No     Alcohol/week: 0.0 standard drinks     Comment: hx alcoholism. (has had alcohol in last 2 months)    Drug use: No       Review of Systems No chest pains, dizziness, heart palpitations, dyspnea, lightheadedness, worsening edema.      Physical Exam  Vitals and nursing note reviewed. Constitutional:       Appearance: Normal appearance. She is well-developed. Neck:      Thyroid: No thyromegaly. Cardiovascular:      Rate and Rhythm: Normal rate and regular rhythm. Pulses: Normal pulses. Heart sounds: Normal heart sounds. No murmur heard. Pulmonary:      Effort: Pulmonary effort is normal. No respiratory distress. Breath sounds: Normal breath sounds. No wheezing or rales. Musculoskeletal:      Cervical back: Normal range of motion. Comments: + thoracic kyphosis  1+ LE edema bilat; scaly, no ulcers   Skin:     General: Skin is warm and dry. Neurological:      General: No focal deficit present. Mental Status: She is alert and oriented to person, place, and time. Mental status is at baseline. Psychiatric:         Mood and Affect: Mood normal.         Behavior: Behavior normal.         Thought Content: Thought content normal.         Judgment: Judgment normal.         ASSESSMENT/PLAN:    1. Hypothyroidism (acquired)  - Clinically euthyroid; continue current dose of Levothroid. - TSH without Reflex; Future    2. Pure hypercholesterolemia  - Stable; continue statin for primary prevention.  - Lipid Panel; Future  - Comprehensive Metabolic Panel; Future    3. Frequent falls  - no recent falls. Doc Lu is sure she is using walker. 4. Nondependent alcohol abuse, in remission  - reports no recent alcohol use. Doc Lu reports her mental status has been stable. 5. Chronic bronchitis, unspecified chronic bronchitis type ([address]  - no pulm sx reported. No meds for this. Not currently smoking. But still wants to    6. Senile osteoporosis  - on boniva and calcium/D. Continue these. She declines DEXA. 7. Encounter for hepatitis C screening test for low risk patient  - Hepatitis C Antibody; Future    8. Tobacco use disorder  - she is not currently using. She declines lung cancer screening scans.       Return in about 6 months (around 5/29/2022) for f/u . An  electronicsignature was used to authenticate this note.     --Judge Corrie MD on 11/29/2021 at 9:17 AM

## 2021-11-30 ENCOUNTER — TELEPHONE (OUTPATIENT)
Dept: FAMILY MEDICINE CLINIC | Age: 76
End: 2021-11-30

## 2021-11-30 NOTE — TELEPHONE ENCOUNTER
RN from [facility] calling stating that pt did have a flu shot 10. 7.2021 at [address] Stated that the facility does not give the shingles shots and do not have a record for that vaccine. FYI.     With Questions Michael Arroyo can be reached at [phone]

## 2022-01-15 ENCOUNTER — NURSE TRIAGE (OUTPATIENT)
Dept: OTHER | Facility: CLINIC | Age: 77
End: 2022-01-15

## 2022-01-15 NOTE — TELEPHONE ENCOUNTER
Received call from [facility] MELIDA with [employer] Complaint. Subjective: Caller states \"I have swelling and redness/warmth in my ankle and I don't know what it could be \"     Somewhat difficult triage due to difficult to understand patient. Current Symptoms: Swelling with redness and warmth in left ankle, pain in middle of ankle, toes feel a little cool to touch compared to other foot, but no color change    Onset: 2 days ago; rapid    Associated Symptoms: reduced activity    Pain Severity: 8/10 when standing, 0/10 when sitting down with feet propped up; warm and nagging pain; constant    Temperature: None None at this time    What has been tried: Elevating foot on pillow    LMP: NA Pregnant: NA    Recommended disposition: Go to ER - discussed possibility of needing doppler scan . Care advice provided, patient verbalizes understanding; denies any other questions or concerns; instructed to call back for any new or worsening symptoms. Patient/caller proceeding to nearest [facility]      Attention Provider: Thank you for allowing me to participate in the care of your patient. The patient was connected to triage in response to information provided to the ECC/PSC. Please do not respond through this encounter as the response is not directed to a shared pool.     Reason for Disposition   [1] Redness AND [2] painful when touched AND [3] no fever    Protocols used: ANKLE SWELLING-ADULT-AH

## 2022-03-10 ENCOUNTER — TELEPHONE (OUTPATIENT)
Dept: FAMILY MEDICINE CLINIC | Age: 77
End: 2022-03-10

## 2022-03-10 NOTE — TELEPHONE ENCOUNTER
Calling stating that the pt was given boniva 150 Mg yesterday. Stated that the computer systems were down yesterday and they were using paper and pt got medication to early. Stated that they would need to switch the administration date to have the next dose on 4.9.2022 to make sure pt does not receive one to soon. Stated that she can receive a verbal at [phone] until 7:00 pm today.

## 2022-03-15 ENCOUNTER — HOSPITAL ENCOUNTER (OUTPATIENT)
Dept: WOMENS IMAGING | Age: 77
Discharge: HOME | End: 2022-03-15
Payer: MEDICAID

## 2022-03-15 DIAGNOSIS — Z12.31 BREAST CANCER SCREENING BY MAMMOGRAM: ICD-10-CM

## 2022-03-15 PROCEDURE — 77067 SCR MAMMO BI INCL CAD: CPT

## 2022-04-28 ENCOUNTER — TELEPHONE (OUTPATIENT)
Dept: FAMILY MEDICINE CLINIC | Age: 77
End: 2022-04-28

## 2022-04-28 NOTE — TELEPHONE ENCOUNTER
Mj Pham calling from [address] [place] [address]  Patient rapid covid test today came back positive   Patient is asymptomatic   Mj Pham is sending pcr covid test today to be collected.      AYLIN

## 2022-04-28 NOTE — TELEPHONE ENCOUNTER
If we can ask if there are any symptoms and if so, what day of symptoms she is on  If asymptomatic, ok to monitor for now.

## 2022-05-16 ENCOUNTER — TELEPHONE (OUTPATIENT)
Dept: FAMILY MEDICINE CLINIC | Age: 77
End: 2022-05-16

## 2022-05-16 NOTE — TELEPHONE ENCOUNTER
Yes, I have known about the falls and requested she come in for follow up. She and her brother determined that since she has an appointment planned already for June 6th they would keep that. Her falls have been without injury. But that she has been very weak since being in [facility]. I would like to see her this week if she is this bad off. Her kidney function has been normal in the past, but adding pain meds without knowing her current health status might be dangerous. will have to continue tylenol until she can be seen.

## 2022-05-16 NOTE — TELEPHONE ENCOUNTER
Calling stating that pt has had multiple falls in the last 7 to 10 days. Stated that pt does have bruising on lower back and buttox. Stated that pt was in quarantine for covid and now she is out. Stated that pt is going to start therapy soon. Stated that they would like to know if pt can get something for the pain because tylenol is not helping. Stated that the DON mentioned possible steroid. Stated that the pt's O2 stat is at 91% and pulse 80. Stated that they did send faxed about the falls. Tried, could not find pharmacy in search    [facility], [place] [address] [place] [address], [facility] [address] [facility], . [phone], fax [phone]    Please Aleisha Walls can be reached at [phone] before 3:30pm today Stated that the [phone]

## 2022-05-17 ENCOUNTER — APPOINTMENT (OUTPATIENT)
Dept: GENERAL RADIOLOGY | Age: 77
End: 2022-05-17
Payer: MEDICAID

## 2022-05-17 ENCOUNTER — APPOINTMENT (OUTPATIENT)
Dept: CT IMAGING | Age: 77
End: 2022-05-17
Payer: MEDICAID

## 2022-05-17 ENCOUNTER — HOSPITAL ENCOUNTER (EMERGENCY)
Age: 77
Discharge: ANOTHER HOSPITAL | End: 2022-05-17
Attending: EMERGENCY MEDICINE
Payer: MEDICAID

## 2022-05-17 VITALS
DIASTOLIC BLOOD PRESSURE: 71 MMHG | BODY MASS INDEX: 30.33 KG/M2 | RESPIRATION RATE: 17 BRPM | HEART RATE: 89 BPM | WEIGHT: 160.5 LBS | OXYGEN SATURATION: 99 % | SYSTOLIC BLOOD PRESSURE: 107 MMHG | TEMPERATURE: 98.3 F

## 2022-05-17 DIAGNOSIS — S82.891A CLOSED FRACTURE OF RIGHT ANKLE, INITIAL ENCOUNTER: ICD-10-CM

## 2022-05-17 DIAGNOSIS — S22.41XA CLOSED FRACTURE OF MULTIPLE RIBS OF RIGHT SIDE, INITIAL ENCOUNTER: Primary | ICD-10-CM
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
## 2022-05-17 LAB
A/G RATIO: 1.6 (ref 1.1–2.2)
ABO/RH: NORMAL
ALBUMIN SERPL-MCNC: 4.2 G/DL (ref 3.4–5)
ALP BLD-CCNC: 144 U/L (ref 40–129)
ALT SERPL-CCNC: 16 U/L (ref 10–40)
ANION GAP SERPL CALCULATED.3IONS-SCNC: 19 MMOL/L (ref 3–16)
ANISOCYTOSIS: ABNORMAL
ANTIBODY SCREEN: NORMAL
APTT: 48.5 SEC (ref 26.2–38.6)
AST SERPL-CCNC: 19 U/L (ref 15–37)
BANDED NEUTROPHILS RELATIVE PERCENT: 3 % (ref 0–7)
BASOPHILS ABSOLUTE: 0 K/UL (ref 0–0.2)
BASOPHILS RELATIVE PERCENT: 0 %
BILIRUB SERPL-MCNC: 0.8 MG/DL (ref 0–1)
BUN BLDV-MCNC: 28 MG/DL (ref 7–20)
CALCIUM SERPL-MCNC: 10 MG/DL (ref 8.3–10.6)
CHLORIDE BLD-SCNC: 93 MMOL/L (ref 99–110)
CO2: 24 MMOL/L (ref 21–32)
CREAT SERPL-MCNC: 0.8 MG/DL (ref 0.6–1.2)
EOSINOPHILS ABSOLUTE: 0 K/UL (ref 0–0.6)
EOSINOPHILS RELATIVE PERCENT: 0 %
GFR AFRICAN AMERICAN: >60
GFR NON-AFRICAN AMERICAN: >60
GLUCOSE BLD-MCNC: 115 MG/DL (ref 70–99)
HCT VFR BLD CALC: 42.1 % (ref 36–48)
HEMOGLOBIN: 13.9 G/DL (ref 12–16)
INR BLD: 1.61 (ref 0.88–1.12)
LIPASE: 10 U/L (ref 13–60)
LYMPHOCYTES ABSOLUTE: 1.5 K/UL (ref 1–5.1)
LYMPHOCYTES RELATIVE PERCENT: 12 %
MCH RBC QN AUTO: 28.9 PG (ref 26–34)
MCHC RBC AUTO-ENTMCNC: 32.9 G/DL (ref 31–36)
MCV RBC AUTO: 87.8 FL (ref 80–100)
METAMYELOCYTES RELATIVE PERCENT: 1 %
MONOCYTES ABSOLUTE: 1 K/UL (ref 0–1.3)
MONOCYTES RELATIVE PERCENT: 8 %
NEUTROPHILS ABSOLUTE: 9.8 K/UL (ref 1.7–7.7)
NEUTROPHILS RELATIVE PERCENT: 76 %
PDW BLD-RTO: 15.8 % (ref 12.4–15.4)
PLATELET # BLD: 261 K/UL (ref 135–450)
PMV BLD AUTO: 8.9 FL (ref 5–10.5)
POLYCHROMASIA: ABNORMAL
POTASSIUM REFLEX MAGNESIUM: 3.9 MMOL/L (ref 3.5–5.1)
PROTHROMBIN TIME: 18.6 SEC (ref 9.9–12.7)
RBC # BLD: 4.8 M/UL (ref 4–5.2)
SODIUM BLD-SCNC: 136 MMOL/L (ref 136–145)
TOTAL PROTEIN: 6.9 G/DL (ref 6.4–8.2)
TROPONIN: <0.01 NG/ML
WBC # BLD: 12.2 K/UL (ref 4–11)

## 2022-05-17 PROCEDURE — 96375 TX/PRO/DX INJ NEW DRUG ADDON: CPT

## 2022-05-17 PROCEDURE — 85025 COMPLETE CBC W/AUTO DIFF WBC: CPT

## 2022-05-17 PROCEDURE — 72125 CT NECK SPINE W/O DYE: CPT

## 2022-05-17 PROCEDURE — 86901 BLOOD TYPING SEROLOGIC RH(D): CPT

## 2022-05-17 PROCEDURE — 80053 COMPREHEN METABOLIC PANEL: CPT

## 2022-05-17 PROCEDURE — 27762 CLTX MED ANKLE FX W/MNPJ: CPT

## 2022-05-17 PROCEDURE — 85730 THROMBOPLASTIN TIME PARTIAL: CPT

## 2022-05-17 PROCEDURE — 86850 RBC ANTIBODY SCREEN: CPT

## 2022-05-17 PROCEDURE — 84484 ASSAY OF TROPONIN QUANT: CPT

## 2022-05-17 PROCEDURE — 73610 X-RAY EXAM OF ANKLE: CPT

## 2022-05-17 PROCEDURE — 86900 BLOOD TYPING SEROLOGIC ABO: CPT

## 2022-05-17 PROCEDURE — 6360000002 HC RX W HCPCS: Performed by: EMERGENCY MEDICINE

## 2022-05-17 PROCEDURE — 99285 EMERGENCY DEPT VISIT HI MDM: CPT

## 2022-05-17 PROCEDURE — 36415 COLL VENOUS BLD VENIPUNCTURE: CPT

## 2022-05-17 PROCEDURE — 96374 THER/PROPH/DIAG INJ IV PUSH: CPT

## 2022-05-17 PROCEDURE — 93005 ELECTROCARDIOGRAM TRACING: CPT

## 2022-05-17 PROCEDURE — 83690 ASSAY OF LIPASE: CPT

## 2022-05-17 PROCEDURE — 71045 X-RAY EXAM CHEST 1 VIEW: CPT

## 2022-05-17 PROCEDURE — 70450 CT HEAD/BRAIN W/O DYE: CPT

## 2022-05-17 PROCEDURE — 71250 CT THORAX DX C-: CPT

## 2022-05-17 PROCEDURE — 85610 PROTHROMBIN TIME: CPT

## 2022-05-17 RX ORDER — ALBUTEROL SULFATE 90 UG/1
2 AEROSOL, METERED RESPIRATORY (INHALATION) EVERY 6 HOURS PRN
COMMUNITY
End: 2022-10-31

## 2022-05-17 RX ORDER — FENTANYL CITRATE 50 UG/ML
25 INJECTION, SOLUTION INTRAMUSCULAR; INTRAVENOUS ONCE
Status: COMPLETED | OUTPATIENT
Start: 2022-05-17 | End: 2022-05-17

## 2022-05-17 RX ORDER — FUROSEMIDE 20 MG/1
20 TABLET ORAL DAILY
COMMUNITY
End: 2022-10-31

## 2022-05-17 RX ORDER — LAMOTRIGINE 100 MG/1
100 TABLET ORAL 2 TIMES DAILY
COMMUNITY

## 2022-05-17 RX ORDER — ONDANSETRON 2 MG/ML
4 INJECTION INTRAMUSCULAR; INTRAVENOUS ONCE
Status: COMPLETED | OUTPATIENT
Start: 2022-05-17 | End: 2022-05-17

## 2022-05-17 RX ADMIN — FENTANYL CITRATE 25 MCG: 50 INJECTION INTRAMUSCULAR; INTRAVENOUS at 19:27

## 2022-05-17 RX ADMIN — ONDANSETRON 4 MG: 2 INJECTION INTRAMUSCULAR; INTRAVENOUS at 19:28

## 2022-05-17 ASSESSMENT — PAIN SCALES - GENERAL
PAINLEVEL_OUTOF10: 7
PAINLEVEL_OUTOF10: 7
PAINLEVEL_OUTOF10: 8
PAINLEVEL_OUTOF10: 7

## 2022-05-17 ASSESSMENT — ENCOUNTER SYMPTOMS
BACK PAIN: 1
SORE THROAT: 0
COUGH: 0
NAUSEA: 0
ABDOMINAL PAIN: 0
DIARRHEA: 0
CONSTIPATION: 0
SHORTNESS OF BREATH: 0
VOMITING: 0
EYE PAIN: 0
RHINORRHEA: 0

## 2022-05-17 ASSESSMENT — PAIN DESCRIPTION - DESCRIPTORS
DESCRIPTORS: ACHING
DESCRIPTORS: ACHING

## 2022-05-17 ASSESSMENT — PAIN DESCRIPTION - PAIN TYPE
TYPE: ACUTE PAIN
TYPE: ACUTE PAIN

## 2022-05-17 ASSESSMENT — PAIN DESCRIPTION - ORIENTATION
ORIENTATION: RIGHT

## 2022-05-17 ASSESSMENT — PAIN DESCRIPTION - FREQUENCY: FREQUENCY: CONTINUOUS

## 2022-05-17 ASSESSMENT — PAIN DESCRIPTION - LOCATION
LOCATION: ANKLE

## 2022-05-17 ASSESSMENT — PAIN - FUNCTIONAL ASSESSMENT: PAIN_FUNCTIONAL_ASSESSMENT: 0-10

## 2022-05-17 NOTE — ED NOTES
Pt from [address] [place] [address] rehab. Spoke with nurse from there. States she does not wear O2 at home.  O2 has been in the low 90s since patient had COVID on 4/28     Kadie Hilton RN  05/17/22 [id number]

## 2022-05-17 NOTE — PROGRESS NOTES
Spoke with Dr Fransico Fletcher in ED  Closed right ankle fx/dx  Recommended reduction and well padded splint  NWB  Ice, strict elevation to aid in pain and swelling  Will need ORIF at some point given displacement/dislocation present  If discharged, will see in office on Thursday @ 1pm for skin/soft tissue check and to discuss surgery    Justo Jackson MD

## 2022-05-17 NOTE — ED PROVIDER NOTES
I independently evaluated and obtained a history and physical on [facility]. All diagnostic, treatment, and disposition assistants were made to myself in conjunction the advanced practice provider. For further details of this patient's emergency department encounter, please see the advanced practice provider's documentation. History: Patient presents emergency department complaining of right ankle pain after a fall at the care facility. She rates her pain as 7 out of 10. It is local.  Does not radiate. She states is worse with any movement of her foot. She denies any chest pain or shortness of breath. She denies any headache or neck pain. Physician Exam: Pleasant female no acute distress. She is mildly tachycardic. Is mildly tachypneic and had a low pulse ox. I was alerted to the patient after the ARAMIS saw the patient. On oxygen she is satting 98 to 99%. Her breath sounds slightly decreased on the right versus left. Her abdomen soft nontender. The Ekg interpreted by me shows  sinus tachycardia, nopn=638   Axis is   Left axis deviation  QTc is  464 msec  Intervals and Durations are unremarkable. ST Segments: Poor baseline from motion artifact. Patient does appear to have nonspecific T wave abnormalities. No ST elevation MI. When compared to an EKG on August 7, 2019 sinus rhythm has been replaced with sinus tachycardia        I personally saw the patient and performed a substantive portion of the visit including all aspects of the medical decision making. MDM: On this patient has an obvious fracture of the lower extremity. It is neurovascularly intact. There is some angulation. Her pulses are easily palpable. On the chest x-ray the patient had 4 ribs that appear to be acutely fractured. Fortunately her CT of the chest is not reveal any pneumothorax. She does have a small right pleural effusion along with some bibasilar opacification that the radiologist feels atelectasis. No pulmonary infiltrate. There is no evidence of any traumatic injury to her abdomen or pelvis. CT head did not reveal any acute pathology. As stated above her right lower extremity showed fracture of the distal tibia and fibula. CT of her cervical spine shows an old burst fracture of the T2 vertebral bodies. Is this patient to be included in the SEP-1 Core Measure? No   Exclusion criteria - the patient is NOT to be included for SEP-1 Core Measure due to:   Infection is not suspected         Oxana Lopez MD  05/18/22 1835

## 2022-05-17 NOTE — ED NOTES
Spoke with [address] [place] [address],[address] again.  They state patient lives in assisted living and has had increasing amount of falls     Mary Murphy RN  05/17/22 [id number]

## 2022-05-17 NOTE — ED PROVIDER NOTES
Procedure note: Right ankle fracture reduction      I was asked to assist with reduction of the right ankle and application of a splint by Dr. Jacob Mena. Patient was assessed. She is fully awake and alert. She is oriented x3. Oxygen saturation is 95% on 2 L. Lungs are clear to auscultation. Airway is patent. No stridor or drooling. Uvula midline. She is neurologically intact. No acute focal motor or sensory deficits. X-rays of the right ankle were reviewed. There is evidence of fracture of the distal fibula, medial malleolus with lateral subluxation. No gross dislocation. There is evidence of ecchymosis and soft tissue swelling to the anterior lateral pretibial surface of the right lower leg. Compartments are soft. Dorsalis pedis and posterior tibial pulses are strong and equal.  Capillary refill less than 2 seconds in all digits. The patient is able to wiggle her toes. She is neurovascularly intact. The procedure was explained to the patient. She agrees to proceed. She was premedicated with fentanyl 25 mcg IV and Zofran 4 mg IV. She did not require IV sedation. Gentle traction was applied to the ankle and the ankle was anatomically aligned without difficulty. Posterior splint and sugar-tong splint were applied with extra Webril padding at the level of the ankle for skin protection. Following application of the splint, capillary refill was less than 2 seconds in all digits. The patient reported improvement. She is neurovascularly intact. Pain is well controlled. She is awake and alert. Postreduction x-ray was reviewed. Mild lateral subluxation persists. No dislocation.      Marvin Noonan, DO  05/17/22 [facility], DO  05/17/22 2012

## 2022-05-17 NOTE — ED NOTES
Pt arrived to room 17 via wheelchair      Transferred to stretcher by [facility] and Heidi Serrano and [employer].   Pt unable to bare weight        Mabel Mendoza RN  05/17/22 3067

## 2022-05-18 LAB
EKG ATRIAL RATE: 101 BPM
EKG DIAGNOSIS: NORMAL
EKG P AXIS: -26 DEGREES
EKG P-R INTERVAL: 156 MS
EKG Q-T INTERVAL: 358 MS
EKG QRS DURATION: 82 MS
EKG QTC CALCULATION (BAZETT): 464 MS
EKG R AXIS: -21 DEGREES
EKG T AXIS: 104 DEGREES
EKG VENTRICULAR RATE: 101 BPM

## 2022-05-18 PROCEDURE — 93010 ELECTROCARDIOGRAM REPORT: CPT | Performed by: INTERNAL MEDICINE

## 2022-05-18 NOTE — ED PROVIDER NOTES
[address] [facility]        Pt Name: Yanira Parham  MRN: [id number]  [place] 1945  Date of evaluation: 5/17/2022  Provider: JEFERSON Rizvi - CNP  PCP: Edil Teresa MD  Note Started: 9:16 PM EDT       I have seen and evaluated this patient with my supervising physician Dr. Tyesha Goldberg       Chief Complaint   Patient presents with    Ankle Pain     right, s/p fall in bathroom         HISTORY OF PRESENT ILLNESS   (Location/Symptom, Timing/Onset, Context/Setting, Quality, Duration, Modifying Factors, Severity)  Note limiting factors. Yanira Parham is a 77 y.o. female who presents to the ED after a fall in the bathroom at her rehab facility. The patient is a poor historian is unable to tell me what happened. She says she \"thinks\" that she did not pass out. She arrives via EMS with an obvious deformity to the right ankle. She also appears to be hypoxic to the 80s and is not normally on baseline supplemental oxygen. She appears to be in acute distress. HPI and ROS obtained with help from patient, EMR, son who then comes to baseline    Nursing Notes were all reviewed and agreed with or any disagreements were addressed in the HPI. REVIEW OF SYSTEMS    (2-9 systems for level 4, 10 or more for level 5)     Review of Systems   Constitutional: Positive for activity change. Negative for chills, diaphoresis and fever. HENT: Negative for congestion, rhinorrhea and sore throat. Eyes: Negative for pain and visual disturbance. Respiratory: Negative for cough and shortness of breath. Cardiovascular: Positive for chest pain. Negative for leg swelling. Gastrointestinal: Negative for abdominal pain, constipation, diarrhea, nausea and vomiting. Genitourinary: Negative for frequency and hematuria. Musculoskeletal: Positive for arthralgias, back pain, gait problem and joint swelling.  Negative for neck pain. Skin: Negative for rash and wound. Neurological: Negative for dizziness, speech difficulty, weakness, light-headedness, numbness and headaches. PAST MEDICAL HISTORY     Past Medical History:   Diagnosis Date    Alcoholic ([facility]     [facility]     Allergic rhinitis     Arthritis     Bipolar 1 disorder ([facility]     Bipolar affective disorder, manic ([facility]     Cancer ([facility] 1999     left breast; lumpectomy    Chronic back pain     Chronic ulcer of left leg with fat layer exposed ([facility] 12/1/2020    Suspect this is a skin cancer    Colonoscopy refused 9/12/2012    Dysphagia     Hyperlipidemia     Hypotension     Hypothyroidism     Mild cognitive impairment     Nicotine dependence     Osteoporosis 5/20/2011    Peripheral edema     Prolonged QT interval     Repeated falls     Stress incontinence     Subdural hematoma ([facility] 2009    Vitamin D deficiency        SURGICAL HISTORY     Past Surgical History:   Procedure Laterality Date    BREAST BIOPSY  1998    BREAST LUMPECTOMY  1999    Left,  no problems.     LEG BIOPSY EXCISION Left 1/13/2021    SURGICAL EXCISION LEFT LOWER LEG BASAL CELL CARCINOMA performed by Sandi Zuniga MD at [address] [place] [address]       Discharge Medication List as of 5/17/2022 11:28 PM      CONTINUE these medications which have NOT CHANGED    Details   albuterol sulfate HFA (VENTOLIN HFA) 108 (90 Base) MCG/ACT inhaler Inhale 2 puffs into the lungs every 6 hours as needed for WheezingHistorical Med      furosemide (LASIX) 20 MG tablet Take 10 mg by mouth dailyHistorical Med      lamoTRIgine (LAMICTAL) 100 MG tablet Take 100 mg by mouth 2 times dailyHistorical Med      ibandronate (BONIVA) 150 MG tablet Take 1 tablet by mouth every 30 days, Disp-30 tablet, R-11Normal      haloperidol (HALDOL) 0.5 MG tablet Take 0.5 mg by mouth every 6 hours as needed Historical Med      nicotine polacrilex (NICORETTE) 2 MG gum Take 2 mg by mouth every 4 hours (while awake)Historical Med      levothyroxine (SYNTHROID) 75 MCG tablet Take 1 tablet by mouth Daily, Disp-90 tablet,R-1Normal      !! QUEtiapine (SEROQUEL) 50 MG tablet Take 250 mg by mouth 2 times daily Historical Med      !! QUEtiapine (SEROQUEL) 25 MG tablet Take 25 mg by mouth Daily with lunch Historical Med      Multiple Vitamins-Minerals (THERAPEUTIC MULTIVITAMIN-MINERALS) tablet Take 1 tablet by mouth dailyHistorical Med      acetaminophen (TYLENOL) 325 MG tablet Take 650 mg by mouth every 6 hours as needed for PainHistorical Med      calcium-vitamin D (OSCAL-500) 500-200 MG-UNIT per tablet Take 1 tablet by mouth 2 times dailyHistorical Med      pravastatin (PRAVACHOL) 20 MG tablet TAKE 1 TABLET BY MOUTH ONE TIME A DAY , Disp-20 tablet, R-5Normal       !! - Potential duplicate medications found. Please discuss with provider. ALLERGIES     Codeine    FAMILYHISTORY       Family History   Problem Relation Age of Onset    Cancer Mother         Bone Cancer    Cancer Father         Lung cancer    Retinal Detachment Brother         SOCIAL HISTORY       Social History     Socioeconomic History    Marital status:      Spouse name: None    Number of children: 2    Years of education: None    Highest education level: None   Occupational History    Occupation: Retired     Employer: [employer]   Tobacco Use    Smoking status: Former Smoker     Packs/day: 1.00     Years: 20.00     Pack years: 20.00     Types: Cigarettes     Quit date: 2019     Years since quittin.8    Smokeless tobacco: Never Used   Substance and Sexual Activity    Alcohol use: No     Alcohol/week: 0.0 standard drinks     Comment: hx alcoholism. (has had alcohol in last 2 months)    Drug use: No    Sexual activity: Not Currently   Other Topics Concern    None   Social History Narrative    [facility].      Social Determinants of Health Financial Resource Strain:     Difficulty of Paying Living Expenses: Not on file   Food Insecurity:     Worried About Running Out of Food in the Last Year: Not on file    Kirstie of Food in the Last Year: Not on file   Transportation Needs:     Lack of Transportation (Medical): Not on file    Lack of Transportation (Non-Medical): Not on file   Physical Activity:     Days of Exercise per Week: Not on file    Minutes of Exercise per Session: Not on file   Stress:     Feeling of Stress : Not on file   Social Connections:     Frequency of Communication with Friends and Family: Not on file    Frequency of Social Gatherings with Friends and Family: Not on file    Attends Oriental orthodox Services: Not on file    Active Member of [address] [place] [address] [employer] or Organizations: Not on file    Attends Club or Organization Meetings: Not on file    Marital Status: Not on file   Intimate Partner Violence:     Fear of Current or Ex-Partner: Not on file    Emotionally Abused: Not on file    Physically Abused: Not on file    Sexually Abused: Not on file   Housing Stability:     Unable to Pay for Housing in the Last Year: Not on file    Number of [place] in the Last Year: Not on file    Unstable Housing in the Last Year: Not on file       SCREENINGS    [place] Coma Scale  Eye Opening: Spontaneous  Best Verbal Response: Oriented  Best Motor Response: Obeys commands  [place] Coma Scale Score: 15        PHYSICAL EXAM    (up to 7 for level 4, 8 or more for level 5)     ED Triage Vitals   BP Temp Temp Source Pulse Resp SpO2 Height Weight   05/17/22 1739 05/17/22 1628 05/17/22 1628 05/17/22 1628 05/17/22 1628 05/17/22 1628 -- 05/17/22 1739   135/70 98.3 °F (36.8 °C) Temporal 100 18 98 %  160 lb 7.9 oz (72.8 kg)       Physical Exam  Vitals and nursing note reviewed. Constitutional:       General: She is awake. She is in acute distress. Appearance: She is well-developed. She is obese. She is ill-appearing. She is not diaphoretic. HENT:      Head: Normocephalic. Right Ear: External ear normal.      Left Ear: External ear normal.      Nose: Nose normal. No congestion or rhinorrhea. Mouth/Throat:      Mouth: Mucous membranes are moist.      Pharynx: Oropharynx is clear. No oropharyngeal exudate. Eyes:      General: No scleral icterus. Right eye: No discharge. Left eye: No discharge. Extraocular Movements: Extraocular movements intact. Conjunctiva/sclera: Conjunctivae normal.      Pupils: Pupils are equal, round, and reactive to light. Cardiovascular:      Rate and Rhythm: Normal rate and regular rhythm. Pulses: Normal pulses. Heart sounds: Normal heart sounds. No murmur heard. No friction rub. No gallop. Pulmonary:      Effort: Respiratory distress present. Breath sounds: No stridor. No wheezing, rhonchi or rales. Comments: Diminished in the bases  Abdominal:      General: There is no distension. Palpations: Abdomen is soft. Tenderness: There is no abdominal tenderness. There is no guarding. Musculoskeletal:      Cervical back: Normal range of motion and neck supple. Tenderness present. No rigidity. Thoracic back: Tenderness and bony tenderness present. Lumbar back: Tenderness and bony tenderness present. Right lower leg: Deformity, tenderness and bony tenderness present. Edema present. Left lower leg: Edema present. Right ankle: Deformity present. Skin:     General: Skin is warm and dry. Capillary Refill: Capillary refill takes less than 2 seconds. Findings: Bruising present. Neurological:      General: No focal deficit present. Mental Status: Mental status is at baseline. Cranial Nerves: No cranial nerve deficit. Comments: Strength 5/5 bilateral upper extremities. Strength diminished to right lower extremity secondary to pain. 4/5 left lower extremity  Neurovascular intact, cap refill less than 2 seconds. Sensations intact. Patient is able to wiggle her toes internally which time holding on both lower legs   Psychiatric:         Mood and Affect: Mood normal.         Behavior: Behavior normal. Behavior is cooperative. DIAGNOSTIC RESULTS   LABS:    Labs Reviewed   CBC WITH AUTO DIFFERENTIAL - Abnormal; Notable for the following components:       Result Value    WBC 12.2 (*)     RDW 15.8 (*)     Neutrophils Absolute 9.8 (*)     Metamyelocytes Relative 1 (*)     Anisocytosis Occasional (*)     Polychromasia Occasional (*)     All other components within normal limits   COMPREHENSIVE METABOLIC PANEL W/ REFLEX TO MG FOR LOW K - Abnormal; Notable for the following components:    Chloride 93 (*)     Anion Gap 19 (*)     Glucose 115 (*)     BUN 28 (*)     Alkaline Phosphatase 144 (*)     All other components within normal limits   PROTIME-INR - Abnormal; Notable for the following components:    Protime 18.6 (*)     INR 1.61 (*)     All other components within normal limits   LIPASE - Abnormal; Notable for the following components:    Lipase 10.0 (*)     All other components within normal limits   APTT - Abnormal; Notable for the following components:    aPTT 48.5 (*)     All other components within normal limits   TROPONIN   URINALYSIS WITH REFLEX TO CULTURE   TYPE AND SCREEN       When ordered, only abnormal lab results are displayed. All other labs were within normal range or not returned as of this dictation. EKG: When ordered, EKG's are interpreted by the Emergency Department Physician in the absence of a cardiologist.  Please see their note for interpretation of EKG.       RADIOLOGY:   Non-plain film images such as CT, Ultrasound and MRI are read by the radiologist. Plain radiographic images are visualized andpreliminarily interpreted by the  ED Provider with the below findings:        Interpretation perthe Radiologist below, if available at the time of this note:    XR ANKLE RIGHT (MIN 3 VIEWS)   Final Result Grossly stable alignment of distal tibia and fibular fractures as well as   disruption of the ankle mortise with overlying splint cast placement. CT HEAD WO CONTRAST   Final Result   No acute intracranial abnormality. Study is somewhat limited by motion. Repeat studies were performed. Cerebral atrophy. The ventricles are mildly prominent. Normal pressure   hydrocephalus could be considered in the appropriate clinical setting. CT CERVICAL SPINE WO CONTRAST   Final Result   No acute abnormality of the cervical spine. Stable burst fracture of the T2 vertebra compared to study in 2017. RECOMMENDATIONS:   Unavailable         CT CHEST ABDOMEN PELVIS WO CONTRAST   Preliminary Result   1. Small right pleural effusion. Bibasilar opacification, likely   atelectasis. No well-defined pulmonary infiltrate. 2. No evidence of traumatic abdominal or pelvic visceral injury. 3. Mild retained colonic stool. 4. Multilevel vertebral compression fractures. A T2 burst fracture is   grossly stable on comparison imaging. No comparison CT is available for the   other fractures. Nonacute osteoporotic fractures are favored. RECOMMENDATIONS:   Unavailable         XR CHEST PORTABLE   Final Result   Traumatic, acute right 4th through 8th rib fractures. No pneumothorax or acute cardiopulmonary disease. XR ANKLE RIGHT (MIN 3 VIEWS)   Final Result   Traumatic, acute on chronic fracture of the distal fibula diametaphysis. Traumatic, acute fracture of the medial malleolus with lateral displacement. XR ANKLE RIGHT (MIN 3 VIEWS)    Result Date: 5/17/2022  EXAMINATION: THREE XRAY VIEWS OF THE RIGHT ANKLE 5/17/2022 7:48 pm COMPARISON: X-ray dated 05/17/2022 HISTORY: ORDERING SYSTEM PROVIDED HISTORY: POST REDUCTION TECHNOLOGIST PROVIDED HISTORY: Reason for exam:->POST REDUCTION Reason for Exam: POST REDUCTION FINDINGS: Interval splint cast placement overlying.   Similar condition->Emergency Medical Condition (MA) Initial evaluation FINDINGS: BRAIN/VENTRICLES: Motion artifact degrades the images. Repeat studies were performed. The ventricles and cisternal spaces are prominent consistent with cerebral atrophy. The ventricles are mildly prominent. Normal pressure hydrocephalus could be considered in the appropriate clinical setting. No areas of abnormal attenuation are identified in the visualized brain parenchyma. No hemorrhage is identified in the brain parenchyma. ORBITS: The visualized portion of the orbits demonstrate no acute abnormality. SINUSES:  The visualized paranasal sinuses and mastoid air cells are for the most part clear. SOFT TISSUES/SKULL:  No acute abnormality of the visualized skull or soft tissues. No acute intracranial abnormality. Study is somewhat limited by motion. Repeat studies were performed. Cerebral atrophy. The ventricles are mildly prominent. Normal pressure hydrocephalus could be considered in the appropriate clinical setting. CT CERVICAL SPINE WO CONTRAST    Result Date: 5/17/2022  EXAMINATION: CT OF THE CERVICAL SPINE WITHOUT CONTRAST 5/17/2022 6:19 pm TECHNIQUE: CT of the cervical spine was performed without the administration of intravenous contrast. Multiplanar reformatted images are provided for review. Automated exposure control, iterative reconstruction, and/or weight based adjustment of the mA/kV was utilized to reduce the radiation dose to as low as reasonably achievable. COMPARISON: 09/15/2017. HISTORY: ORDERING SYSTEM PROVIDED HISTORY: trauma TECHNOLOGIST PROVIDED HISTORY: Reason for exam:->trauma Decision Support Exception - unselect if not a suspected or confirmed emergency medical condition->Emergency Medical Condition (MA) FINDINGS: BONES/ALIGNMENT: There is a stable burst fracture of the T2 vertebra. No acute cervical spine fracture is identified. No traumatic malalignment.  Exaggerated thoracic kyphosis with normal cervical lordotic curvature. DEGENERATIVE CHANGES: Cervical disc space height is preserved. Mild multilevel facet arthropathy. Mild degenerative change at the C1-2 articulation. SOFT TISSUES: There is no prevertebral soft tissue swelling. No acute abnormality of the cervical spine. Stable burst fracture of the T2 vertebra compared to study in 2017. RECOMMENDATIONS: Unavailable     XR CHEST PORTABLE    Result Date: 5/17/2022  EXAMINATION: ONE XRAY VIEW OF THE CHEST 5/17/2022 5:49 pm COMPARISON: 12/21/2017 HISTORY: ORDERING SYSTEM PROVIDED HISTORY: fall TECHNOLOGIST PROVIDED HISTORY: Reason for exam:->fall Reason for Exam: fall; sob FINDINGS: There are right 4th through 8th rib fractures. There is displacement of the 6 rib fracture. Other fractures are nondisplaced or mildly displaced. Cardiac silhouette mediastinal contours are normal.  No pneumothorax is seen. Old right humeral neck fracture with sclerosis. Surgical clips in the left axilla. Traumatic, acute right 4th through 8th rib fractures. No pneumothorax or acute cardiopulmonary disease. CT CHEST ABDOMEN PELVIS WO CONTRAST    Result Date: 5/17/2022  EXAMINATION: CT OF THE CHEST, ABDOMEN, AND PELVIS WITHOUT CONTRAST 5/17/2022 6:19 pm TECHNIQUE: CT of the chest, abdomen and pelvis was performed without the administration of intravenous contrast. Multiplanar reformatted images are provided for review. Automated exposure control, iterative reconstruction, and/or weight based adjustment of the mA/kV was utilized to reduce the radiation dose to as low as reasonably achievable. COMPARISON: None HISTORY: ORDERING SYSTEM PROVIDED HISTORY: Trauma.  TECHNOLOGIST PROVIDED HISTORY: Reason for exam:->Trauma Additional Contrast?->None Decision Support Exception - unselect if not a suspected or confirmed emergency medical condition->Emergency Medical Condition (MA) FINDINGS: Chest: Mediastinum: The thoracic aorta is normal in caliber with calcified atherosclerotic plaque. The heart is not enlarged with scattered coronary vascular calcification and no pericardial effusion. The esophagus is unremarkable with mild distension of the mid esophagus with retained food material..  No significant mediastinal hematoma or adenopathy. Lungs/pleura: Small right pleural effusion. Dependent opacification in the lower lobes bilaterally as well as in the right middle lobe along the major fissure, likely atelectasis. No acute pulmonary infiltrate. No pneumothorax. The central airways are patent. Soft Tissues/Bones: Multilevel vertebral compression fractures with exaggerated thoracic kyphosis. A T2 burst fracture is stable compared to a CT of the cervical spine in 2017. There are also compression fractures of T4 through T8 as well as the superior endplate of X79 and L52. Although no comparison CT is available, findings appears similar on chest x-ray 12/21/2017. No other acute osseous or soft tissue abnormality. Abdomen/Pelvis: Organs: The gallbladder is distended. The unenhanced liver, spleen, pancreas and adrenal glands are unremarkable. No acute renal pathology. GI/Bowel: Mild retained stool throughout the colon. No pericolonic inflammatory changes. No evidence of appendicitis. No small bowel distention. The stomach and duodenal sweep are unremarkable. Pelvis: No pelvic mass or free pelvic fluid. The uterus and adnexal structures are unremarkable. Mild distention of the urinary bladder. Peritoneum/Retroperitoneum: The abdominal aorta is normal in caliber with calcified atherosclerotic plaque. No retroperitoneal hematoma or upper abdominal ascites. Bones/Soft Tissues: Mild compression fracture of the superior endplate of L1. No other acute osseous or soft tissue abnormality. Small fat containing umbilical hernia. 1. Small right pleural effusion. Bibasilar opacification, likely atelectasis. No well-defined pulmonary infiltrate.  2. No evidence of traumatic abdominal or pelvic visceral injury. 3. Mild retained colonic stool. 4. Multilevel vertebral compression fractures. A T2 burst fracture is grossly stable on comparison imaging. No comparison CT is available for the other fractures. Nonacute osteoporotic fractures are favored. RECOMMENDATIONS: Unavailable         PROCEDURES   Unless otherwise noted below, none     Procedures    CRITICAL CARE TIME   Total Critical Care time was 40 minutes, excluding separately reportable procedures. There was a high probability of clinically significant/life threatening deterioration in the patient's condition which required my urgent intervention. This time spent assessing, reassessing patient, chart review and discussing case with other providers    CONSULTS:  David and DIFFERENTIAL DIAGNOSIS/MDM:   Vitals:    Vitals:    05/17/22 2226 05/17/22 2236 05/17/22 2256 05/17/22 2306   BP: 104/86 111/76 (!) 112/95 107/71   Pulse: 87 90 86 89   Resp: 24 21 19 17   Temp:       TempSrc:       SpO2: 98% 98% 100% 99%   Weight:           Patient was given thefollowing medications:  Medications   fentaNYL (SUBLIMAZE) injection 25 mcg (25 mcg IntraVENous Given 5/17/22 1927)   ondansetron (ZOFRAN) injection 4 mg (4 mg IntraVENous Given 5/17/22 1928)     ED Course as of 05/17/22 2337   Tue May 17, 2022   2238 Talk with the trauma service and emergency department at the [facility]. Talked with Dr. Lola Chapin and Dr. Elana Avila. They are requesting ED to ED transfer for further evaluation. [KM]      ED Course User Index  [KM] Darby Nunn, APRN - CNP      Is this patient to be included in the SEP-1 Core Measure due to severe sepsis or septic shock?    No   Exclusion criteria - the patient is NOT to be included for SEP-1 Core Measure due to:  May have criteria for sepsis, but does not meet criteria for severe sepsis or septic shock  78-year-old female presenting from rehab facility after a fall. She is unable to tell me what happened. Presentation and history as above. Differential diagnosis: includes but not limited to Arterial Injury/Ischemia, Fracture, Dislocation, Infection, Compartment Syndrome, Neurologic Deficit/Injury. PE as above, concerning for ankle fracture/dislocation, right rib pain. Per her son, this is her baseline mental status. Labs:  Reviewed type and screen, CBC with minimal leukocytosis 12.2 ANC is elevated 9.8, patient is afebrile at this time. Urinalysis is pending as patient declined catheter multiple times. CMP with chloride 93, gap 19, glucose 115, BUN 28, normal renal function. Alk phos 144. Reviewed PT/INR, lipase, troponin, APTT. Reviewed imaging of the right ankle pre and postreduction. Reviewed CT head, C-spine, chest abdomen and pelvis as well as chest x-ray    Participated in reduction of right ankle with Dr. Oralia Vance. See note by Dr. Oralia Vance for further details. Initially to medicine service was consulted for admission however per the recommendation, trauma service at [facility] was consulted. See documentation above. Patient is accepted by trauma service to [facility] ER to ER transfer for further evaluation management of multiple traumatic injuries the patient suffered       FINAL IMPRESSION      1. Closed fracture of multiple ribs of right side, initial encounter    2. Closed fracture of right ankle, initial encounter    3. Fall, initial encounter          DISPOSITION/PLAN   DISPOSITION Decision To Transfer 05/17/2022 10:17:25 PM      PATIENT REFERREDTO:  No follow-up provider specified.     DISCHARGE MEDICATIONS:  Discharge Medication List as of 5/17/2022 11:28 PM          DISCONTINUED MEDICATIONS:  Discharge Medication List as of 5/17/2022 11:28 PM                 (Please note that portions ofthis note were completed with a voice recognition program.  Efforts were made to edit the dictations but occasionally words are mis-transcribed.)    JEFERSON Delong CNP (electronically signed)             JEFERSON Delong CNP  05/17/22 [id number]

## 2022-05-18 NOTE — PROGRESS NOTES
Pharmacy Medication Reconciliation Note     List of medications Carlos Heads is currently taking is complete. Source of information:   1. [address] Med List     Notes regarding home medications:   1.  Facility was able to fax over all meds and report all Am/afternoon meds administered PTA in the ED    Facility denies administering any OTC or herbal medications    Sveta Estrella, Pharmacy Intern  5/17/2022  10:07 PM

## 2022-06-03 ENCOUNTER — TELEPHONE (OUTPATIENT)
Dept: FAMILY MEDICINE CLINIC | Age: 77
End: 2022-06-03

## 2022-06-03 NOTE — TELEPHONE ENCOUNTER
Please let Silas Chou know that this is fine. I can also do a virtual visit if he can be with her, just to discuss her care. I think what Dereje Preciado really needs, for her protection and fall prevention, is a more supportive living environment with more supervision. Jaida's safety is what I would want to discuss with Silas Chou and Dereje Preciado.

## 2022-06-03 NOTE — TELEPHONE ENCOUNTER
I called Faustnia Goldberg, he said he had to cancel Renetta's appt for Monday,  She fell on 5/24/22 in her bathroom with the help of 2 other people. Fractured right ankle, 4 ribs on the right and hurt her back. Went to [facility]. They transferred her to [facility], had surgery to put a aleks in her ankle. Is non weight bearing for 6 weeks. She was seen at Ortho office yesterday for a f/u and hosp f/u  He can't take her to appt's at this time, has to go by ambulance. So rescheduled her appt for 7/5/22.

## 2022-06-03 NOTE — TELEPHONE ENCOUNTER
----- Message from Gisela Bullard sent at 6/3/2022 10:04 AM EDT -----  Subject: Message to Provider    QUESTIONS  Information for Provider? Please call RE; appt on Monday, need to cancel? Alexandriaer, Gema Scruggs, would like to speak to the nurse. Thank you  ---------------------------------------------------------------------------  --------------  CALL BACK INFO  What is the best way for the office to contact you? OK to leave message on   voicemail  Preferred Call Back Phone Number? [phone]  ---------------------------------------------------------------------------  --------------  SCRIPT ANSWERS  Relationship to Patient? Other  Representative Name?   Is the Representative on the appropriate HIPAA document in Epic?  Yes

## 2022-06-06 NOTE — TELEPHONE ENCOUNTER
AYLIN  I called Joseph Early (MARIA), he said she is in [address] now and will be for the next 6-8 weeks. He will keep the appt on 7/5/22, and will decide before her appt if he will bring her in for visit or if it will be Virtual.  He feels you and him are on the same page, and he has told Patricia Hendricks this. He will speak to her 2 children to get their input about her living situation when it gets closer to her being D/C from SN.

## 2022-06-22 ENCOUNTER — OUTSIDE SERVICES (OUTPATIENT)
Dept: WOUND CARE | Age: 77
End: 2022-06-22
Payer: MEDICAID

## 2022-06-22 DIAGNOSIS — L89.896 PRESSURE INJURY OF DEEP TISSUE OF RIGHT FOOT: ICD-10-CM

## 2022-06-22 DIAGNOSIS — L89.890 PRESSURE INJURY OF TOE OF RIGHT FOOT, UNSTAGEABLE (HCC): ICD-10-CM

## 2022-06-22 PROCEDURE — 99308 SBSQ NF CARE LOW MDM 20: CPT | Performed by: CLINICAL NURSE SPECIALIST

## 2022-06-26 NOTE — PROGRESS NOTES
88 [facility]    Patient name: Johnny Fraire  :   6-  Facility:  [address] [place] [address] Service: skilled nursing facility (31)    Primary diagnosis for wound-care consultation: Wounds to right foot and right second toe    Additional ulcer(s) noted? None    History of Present Illness: Right foot with dark discolored callus. DTI. Right second toe with dry scab. Unstageable. Claw toe. Both probable medical device related. Has been wearing boot to right foot. Status post ORIF right ankle. Consult OT PT to pad these areas on boot. Appetite good. No signs of wound infection. No fever or chills. Review of Systems: Pertinent systems reviewed in the HPI; all other systems reviewed, and negative. Pertinent elements of past medical, surgical, family, and/or social history: Unspecified fracture of shaft of right tibia, multiple fractures of ribs, fracture second lumbar vertebrae, fracture of third lumbar vertebrae, bipolar disorder, mild cognitive impairment and anxiety disorder    Medications and allergies are detailed in the nursing home chart, and were reviewed by me today.  _______________________    General Physical exam:    Vital signs:  109/70, 96.7, 79, 18, 90    General Appearance: alert and oriented to person, well developed and well-nourished, in no acute distress  Psychiatric:  Mood and affect appropriate for situation  Skin: warm and dry, no rash  Head: normocephalic and atraumatic  Eyes: pupils equal, round, sclerae anicteric, conjunctivae normal  ENT: no thrush or oral ulcers  Neck:No complaints, normal appearance  Pulmonary/Chest: Respirations easy at rest, no cough or respiratory distress  Cardiovascular: No chest pain, normal rate, toes warm, cap refill normal, right PT and DP pulse strong with Doppler  Abdomen: No nausea or vomiting  Extremities: no cyanosis, edema or cellulitis.  Claw toe, Right 2nd toe  Musculoskeletal: Ambulatory, moves all Offloading: Betadine, alginate AG and adhesive foam.  Refuses offloading boot when in bed. Offload with pillows.     - Labs / Diagnostic studies: None    - Medications / nutritional support: Appetite is good    - Further Consultations recommended: Consult OT/PT to pad boot to protect callus area on right foot and right second toe    - Anticipated follow-up: Weekly evaluation  _______________________    Electronically signed by JEFERSON Pak CNP on 6/26/2022 at 7:20 PM

## 2022-07-06 ENCOUNTER — OUTSIDE SERVICES (OUTPATIENT)
Dept: WOUND CARE | Age: 77
End: 2022-07-06
Payer: MEDICAID

## 2022-07-06 DIAGNOSIS — L89.890 PRESSURE INJURY OF TOE OF RIGHT FOOT, UNSTAGEABLE (HCC): ICD-10-CM

## 2022-07-06 PROCEDURE — 99308 SBSQ NF CARE LOW MDM 20: CPT | Performed by: CLINICAL NURSE SPECIALIST

## 2022-07-10 NOTE — PROGRESS NOTES
88 [facility]    Patient name: Aleja Urbina  :   3-  Facility:  [address] [place] [address] Service: skilled nursing facility (78)    Primary diagnosis for wound-care consultation: Right foot DTI. Right second toe unstageable pressure injury, probable medical device related. Additional ulcer(s) noted? None    History of Present Illness: DTI to right foot resolved today. Unstageable pressure injury to right second toe, probable medical device related. Boot to right foot. Dry, adherent, intact scab. Appetite is good. No signs of wound infection. No fever or chills. Review of Systems: Pertinent systems reviewed in the HPI; all other systems reviewed, and negative. Pertinent elements of past medical, surgical, family, and/or social history: Unspecified fracture of shaft of right tibia, multiple fractures of ribs, fractured second lumbar vertebrae, fracture of third vertebrae lumbar, bipolar disorder, mild cognitive impairment with anxiety disorder.     Medications and allergies are detailed in the nursing home chart, and were reviewed by me today.  _______________________    General Physical exam:    Vital signs:  109/74, 98.3, 97, 16, 95%    General Appearance: alert and oriented to person, well developed and well-nourished, in no acute distress  Psychiatric:  Mood and affect appropriate for situation  Skin: warm and dry, no rash  Head: normocephalic and atraumatic  Eyes: pupils equal, round, sclerae anicteric, conjunctivae normal  ENT: no thrush or oral ulcers  Neck:No complaints, normal appearance  Pulmonary/Chest: Respirations easy at rest, no cough or respiratory distress  Cardiovascular: No chest pain, normal rate, toes warm, cap refill normal, PT and DP pulse strong with Doppler  Abdomen: No nausea or vomiting  Extremities: no cyanosis, edema or cellulitis  Musculoskeletal: Ambulatory, moves all extremities, no deformities  Neurologic: distal sensation to light touch impaired able to sense 5 out of 10 with monofilament testing, no allodynia. Wound exam:    Wound location: Right 2nd Toe   Length (cm) 0.5   Width (cm) 0.6   Depth (cm) Covered with scab   Tunneling 0   Undermining 0    Wound type:   Pressure  Grade - stage - thickness: Unstageable     Description of periwound: Intact    Description of wound bed: Wound with dry, adherent, intact scab. Surrounding tissue and ulcer without signs and symptoms of infection. No purulence, malodor, erythema, increased temperature, or increased pain.  _______________________    Recent labs and data reviewed: No new labs  _______________________     Annie Molina diagnoses & assessment: DTI to right foot resolved today. Unstageable pressure injury to right second toe, probable medical device related. Boot to right foot. Dry, adherent, intact scab. Appetite is good. No signs of wound infection. Debridement is not indicated today, based on the history and exam above.  _______________________    Procedure:    Consent obtained. Time out performed per [facility]. _______________________    Recommendations:    - Dressings / Compression / Offloading: Betadine, alginate AG and adhesive foam.  Offloading with pillows.     - Labs / Diagnostic studies: None    - Medications / nutritional support: Good    - Further Consultations recommended: Consult OT/PT to pad boot to protect callus area on right foot and right second toe    - Anticipated follow-up: Weekly evaluation  _______________________    Electronically signed by JEFERSON Reyes CNP on 7/10/2022 at 5:53 PM

## 2022-07-13 ENCOUNTER — OUTSIDE SERVICES (OUTPATIENT)
Dept: WOUND CARE | Age: 77
End: 2022-07-13
Payer: MEDICAID

## 2022-07-13 DIAGNOSIS — L89.890 PRESSURE INJURY OF TOE OF RIGHT FOOT, UNSTAGEABLE (HCC): ICD-10-CM

## 2022-07-13 PROCEDURE — 99308 SBSQ NF CARE LOW MDM 20: CPT | Performed by: CLINICAL NURSE SPECIALIST

## 2022-07-14 NOTE — PROGRESS NOTES
88 [facility]    Patient name: Franko Calvo  :   1-  Facility:  [address] [place] [address] of Service: skilled nursing facility (87)    Primary diagnosis for wound-care consultation: Unstageable pressure injury right second toe. Additional ulcer(s) noted? None    History of Present Illness: Unstageable pressure injury right second toe. Call toe. Probable medical device related. Dry, adherent, intact scab. No signs of wound infection. Appetite is good. No fever or chills. Review of Systems: Pertinent systems reviewed in the HPI; all other systems reviewed, and negative. Pertinent elements of past medical, surgical, family, and/or social history: Unspecified fracture of shaft of right tibia, multiple fractures of ribs, fractured second lumbar vertebrae, fracture of third lumbar vertebrae, bipolar disorder, mild cognitive impairment and anxiety disorder. Medications and allergies are detailed in the nursing home chart, and were reviewed by me today.  _______________________    General Physical exam:    General Appearance: alert and oriented to person, well developed and well-nourished, in no acute distress  Psychiatric:  Mood and affect appropriate for situation  Skin: warm and dry, no rash  Head: normocephalic and atraumatic  Eyes: pupils equal, round, sclerae anicteric, conjunctivae normal  ENT: no thrush or oral ulcers  Neck:No complaints, normal appearance  Pulmonary/Chest: Respirations easy at rest, no cough or respiratory distress  Cardiovascular: No chest pain, normal rate, toes warm, cap refill normal  Abdomen: No nausea or vomiting  Extremities: no cyanosis, edema or cellulitis.  Claw toe deformity  Musculoskeletal: Ambulatory, moves all extremities  Neurologic: distal sensation to light touch impaired with neuropathy       Wound exam:    Wound location: Right 2nd toe   Length (cm) 0.3   Width (cm) 0.5   Depth (cm) 0.1, estimated, Covered with scab   Tunneling 0   Undermining 0    Wound type:   Pressure  Grade - stage - thickness: Unstageable     Description of periwound: Intact    Description of wound bed: Wound with dry, hearing, intact scab. Surrounding tissue and ulcer without signs and symptoms of infection. No purulence, malodor, erythema, increased temperature, or increased pain.  _______________________    Recent labs and data reviewed: No new labs  _______________________     Suella Butts diagnoses & assessment: Unstageable pressure injury right second toe. Call toe. Probable medical device related. Dry, adherent, intact scab. No signs of wound infection. Appetite is good. No fever or chills. Debridement is not indicated today, based on the history and exam above.  _______________________    Procedure:    Consent obtained. Time out performed per [facility]. _______________________    Recommendations:    - Dressings / Compression / Offloading: Betadine, alginate AG and adhesive foam.  Offload with pillows.     - Labs / Diagnostic studies: None    - Medications / nutritional support: Appetite is good    - Further Consultations recommended: Work with OT and PT    - Anticipated follow-up: Weekly evaluation  _______________________    Electronically signed by JEFERSON Lilly CNP on 7/14/2022 at 9:24 AM

## 2022-07-20 ENCOUNTER — OUTSIDE SERVICES (OUTPATIENT)
Dept: WOUND CARE | Age: 77
End: 2022-07-20
Payer: MEDICAID

## 2022-07-20 DIAGNOSIS — L89.890 PRESSURE INJURY OF TOE OF RIGHT FOOT, UNSTAGEABLE (HCC): Primary | ICD-10-CM

## 2022-07-20 PROCEDURE — 99308 SBSQ NF CARE LOW MDM 20: CPT | Performed by: CLINICAL NURSE SPECIALIST

## 2022-07-20 NOTE — PROGRESS NOTES
[facility]  Extended [facility]    Patient name: John Paul Espinoza  :   0-  Facility:  [address] [place] [address] Service: nursing facility (51)    Primary diagnosis for wound-care consultation: Unstageable pressure injury right second toe. Additional ulcer(s) noted? None    History of Present Illness: Unstageable pressure injury right second toe. Dry, hearing, intact scab. No signs of wound infection. Appetite is good. No fever or chills. Review of Systems: Pertinent systems reviewed in the HPI; all other systems reviewed, and negative. Pertinent elements of past medical, surgical, family, and/or social history: Unspecified fracture of shaft of right tibia, multiple fractures of ribs, fractured second lumbar vertebrae, fracture of third lumbar vertebrae, bipolar disorder, mild cognitive impairment and anxiety disorder. Medications and allergies are detailed in the nursing home chart, and were reviewed by me today.  _______________________    General Physical exam:    Vital signs:  103/67, 97.7, 85, 16    General Appearance: alert and oriented to person, well developed and well-nourished, in no acute distress  Psychiatric:  Mood and affect appropriate for situation  Skin: warm and dry, no rash  Head: normocephalic and atraumatic  Eyes: pupils equal, round, sclerae anicteric, conjunctivae normal  ENT: no thrush or oral ulcers  Neck:No complaints, normal appearance  Pulmonary/Chest: Respirations easy at rest, no cough or respiratory distress  Cardiovascular: No chest pain, normal rate, toes warm, cap refill normal, PT and DP pulse strong with Doppler  Abdomen: No nausea or vomiting  Extremities: no cyanosis, edema or cellulitis.   Call toe to right second toe  Musculoskeletal: Ambulatory, moves all extremities, no deformities  Neurologic: distal sensation to light touch impaired with neuropathy, unable to sense 5 out of 10 with monofilament testing      Wound exam:    Wound location: Right 2nd Toe   Length (cm) 0.1   Width (cm) 0.6   Depth (cm) 0.1, estimated - covered with scab   Tunneling 0   Undermining 0    Wound type:   Pressure  Grade - stage - thickness: Unstageable     Description of periwound: Intact    Description of wound bed: Wound with dry, hearing, intact scab. Surrounding tissue and ulcer without signs and symptoms of infection. No purulence, malodor, erythema, increased temperature, or increased pain.   _______________________    Recent labs and data reviewed: No new labs  _______________________     Luna Luna diagnoses & assessment: Unstageable pressure injury right second toe. Dry, hearing, intact scab. No signs of wound infection. Appetite is good. Debridement is not indicated today, based on the history and exam above.  _______________________    Procedure:    Consent obtained. Time out performed per [facility]. Recommendations:    - Dressings / Compression / Offloading: Betadine, alginate AG and adhesive foam.  Offloading with pillows.     - Labs / Diagnostic studies: None    - Medications / nutritional support: Appetite is good    - Further Consultations recommended: None    - Anticipated follow-up: Weekly evaluation  _______________________    Electronically signed by JEFERSON Jerry CNP on 7/20/2022 at 4:24 PM

## 2022-08-11 ENCOUNTER — OFFICE VISIT (OUTPATIENT)
Dept: FAMILY MEDICINE CLINIC | Age: 77
End: 2022-08-11
Payer: MEDICAID

## 2022-08-11 VITALS
SYSTOLIC BLOOD PRESSURE: 128 MMHG | BODY MASS INDEX: 30.33 KG/M2 | HEIGHT: 61 IN | RESPIRATION RATE: 18 BRPM | DIASTOLIC BLOOD PRESSURE: 60 MMHG | HEART RATE: 75 BPM | OXYGEN SATURATION: 94 %

## 2022-08-11 DIAGNOSIS — R60.0 LOWER LEG EDEMA: ICD-10-CM

## 2022-08-11 DIAGNOSIS — E78.00 PURE HYPERCHOLESTEROLEMIA: ICD-10-CM

## 2022-08-11 DIAGNOSIS — E03.9 HYPOTHYROIDISM (ACQUIRED): ICD-10-CM

## 2022-08-11 DIAGNOSIS — R29.6 FREQUENT FALLS: Primary | ICD-10-CM

## 2022-08-11 DIAGNOSIS — R07.89 LEFT-SIDED CHEST WALL PAIN: ICD-10-CM

## 2022-08-11 DIAGNOSIS — M81.0 SENILE OSTEOPOROSIS: ICD-10-CM

## 2022-08-11 LAB
A/G RATIO: 1.9 (ref 1.1–2.2)
ALBUMIN SERPL-MCNC: 4.3 G/DL (ref 3.4–5)
ALP BLD-CCNC: 109 U/L (ref 40–129)
ALT SERPL-CCNC: 10 U/L (ref 10–40)
ANION GAP SERPL CALCULATED.3IONS-SCNC: 12 MMOL/L (ref 3–16)
AST SERPL-CCNC: 18 U/L (ref 15–37)
BILIRUB SERPL-MCNC: 0.3 MG/DL (ref 0–1)
BUN BLDV-MCNC: 18 MG/DL (ref 7–20)
CALCIUM SERPL-MCNC: 9.7 MG/DL (ref 8.3–10.6)
CHLORIDE BLD-SCNC: 103 MMOL/L (ref 99–110)
CHOLESTEROL, TOTAL: 179 MG/DL (ref 0–199)
CO2: 29 MMOL/L (ref 21–32)
CREAT SERPL-MCNC: 0.6 MG/DL (ref 0.6–1.2)
GFR AFRICAN AMERICAN: >60
GFR NON-AFRICAN AMERICAN: >60
GLUCOSE BLD-MCNC: 80 MG/DL (ref 70–99)
HDLC SERPL-MCNC: 73 MG/DL (ref 40–60)
LDL CHOLESTEROL CALCULATED: 81 MG/DL
POTASSIUM SERPL-SCNC: 4.3 MMOL/L (ref 3.5–5.1)
SODIUM BLD-SCNC: 144 MMOL/L (ref 136–145)
TOTAL PROTEIN: 6.6 G/DL (ref 6.4–8.2)
TRIGL SERPL-MCNC: 123 MG/DL (ref 0–150)
TSH SERPL DL<=0.05 MIU/L-ACNC: 3.94 UIU/ML (ref 0.27–4.2)
VLDLC SERPL CALC-MCNC: 25 MG/DL

## 2022-08-11 PROCEDURE — 1123F ACP DISCUSS/DSCN MKR DOCD: CPT | Performed by: FAMILY MEDICINE

## 2022-08-11 PROCEDURE — 99214 OFFICE O/P EST MOD 30 MIN: CPT | Performed by: FAMILY MEDICINE

## 2022-08-11 RX ORDER — LIDOCAINE 4 G/G
1 PATCH TOPICAL DAILY
Qty: 30 PATCH | Refills: 0 | Status: SHIPPED | OUTPATIENT
Start: 2022-08-11 | End: 2022-09-10

## 2022-08-11 ASSESSMENT — PATIENT HEALTH QUESTIONNAIRE - PHQ9
3. TROUBLE FALLING OR STAYING ASLEEP: 0
9. THOUGHTS THAT YOU WOULD BE BETTER OFF DEAD, OR OF HURTING YOURSELF: 0
6. FEELING BAD ABOUT YOURSELF - OR THAT YOU ARE A FAILURE OR HAVE LET YOURSELF OR YOUR FAMILY DOWN: 0
7. TROUBLE CONCENTRATING ON THINGS, SUCH AS READING THE NEWSPAPER OR WATCHING TELEVISION: 0
SUM OF ALL RESPONSES TO PHQ QUESTIONS 1-9: 1
5. POOR APPETITE OR OVEREATING: 0
SUM OF ALL RESPONSES TO PHQ QUESTIONS 1-9: 1
SUM OF ALL RESPONSES TO PHQ QUESTIONS 1-9: 1
4. FEELING TIRED OR HAVING LITTLE ENERGY: 0
10. IF YOU CHECKED OFF ANY PROBLEMS, HOW DIFFICULT HAVE THESE PROBLEMS MADE IT FOR YOU TO DO YOUR WORK, TAKE CARE OF THINGS AT HOME, OR GET ALONG WITH OTHER PEOPLE: 0
2. FEELING DOWN, DEPRESSED OR HOPELESS: 1
8. MOVING OR SPEAKING SO SLOWLY THAT OTHER PEOPLE COULD HAVE NOTICED. OR THE OPPOSITE, BEING SO FIGETY OR RESTLESS THAT YOU HAVE BEEN MOVING AROUND A LOT MORE THAN USUAL: 0
1. LITTLE INTEREST OR PLEASURE IN DOING THINGS: 0
SUM OF ALL RESPONSES TO PHQ QUESTIONS 1-9: 1
SUM OF ALL RESPONSES TO PHQ9 QUESTIONS 1 & 2: 1

## 2022-08-11 NOTE — PROGRESS NOTES
2022    Blood pressure 128/60, pulse 75, resp. rate 18, height 5' 1\" (1.549 m), SpO2 94 %, not currently breastfeeding. Tammy Tripathi (:  1945) is a 77 y.o. female, here for evaluation of the following medical concerns:    Chief Complaint   Patient presents with    Follow-up     Pt is here for a general follow up. Pt fell and broke her ankle in may but is recovered and doing well now. Pt said that she is doing okay now, and she id recovered. Fracture after falling in bathing area, despite 2 aides in May. () R ankle fx and 4 ribs    In [facility] for a week after ORIF R ankle for bimalleolar fracture. Plans to leave hardware in place. Ortho is Dr Ghada Erickson at [facility]. (Saw him 4 days ago)    Transferred back to [address] [place] [address] for rehab after that. Has remained there tyring to regain ambulatory skill with walker (can move very short distances with walker). She is high risk for falls still - fell twice last month when she transferred without an aide. She has orderes for all transfers to be assisted, but she is not complaint. Dora Walker is frustrated by this, but is quite powerless to make her always call for an aide. She is hoping to be transferred back to her usual resident care at [facility], but her brother notes that she does not do anything other than sit in a chair without continued therapy in the skilled unit. She refuses to sleep in her bed, so legs are swollen. Finds a chair more comfortable. Has osteoporosis. On boniva monthly. She refuses taking calcium supplement. I prescribe synthroid. Last TSH 0.96 21. She is unsure of taking lasix or not. Nor pravastatin. Her psych care is managed by [facility] at [place] [facility].  Dr Ed Jordan    Patient Active Problem List   Diagnosis    Hyperlipidemia    Thoracic vertebral fracture- T7 (2010)    B12 deficiency    KEE (stress urinary incontinence, female)    Hypothyroidism (acquired)    Wernicke's 325 MG tablet Take 650 mg by mouth every 6 hours as needed for Pain Yes Historical Provider, MD   calcium-vitamin D (OSCAL-500) 500-200 MG-UNIT per tablet Take 1 tablet by mouth 2 times daily Yes Historical Provider, MD   pravastatin (PRAVACHOL) 20 MG tablet TAKE 1 TABLET BY MOUTH ONE TIME A DAY  Yes Srinivasa Jones MD        Social History     Tobacco Use    Smoking status: Former     Packs/day: 1.00     Years: 20.00     Pack years: 20.00     Types: Cigarettes     Quit date: 7/27/2019     Years since quitting: 3.0    Smokeless tobacco: Never   Substance Use Topics    Alcohol use: No     Alcohol/week: 0.0 standard drinks     Comment: hx alcoholism. (has had alcohol in last 2 months)    Drug use: No       Review of Systems No chest pains, dizziness, heart palpitations, dyspnea, lightheadedness. + mid-low back pain    Physical Exam  Vitals and nursing note reviewed. Constitutional:       Appearance: Normal appearance. She is well-developed. She is obese. Comments: Kyphotic, sitting in w/c   Neck:      Thyroid: No thyromegaly. Cardiovascular:      Rate and Rhythm: Normal rate and regular rhythm. Pulses: Normal pulses. Heart sounds: Normal heart sounds. No murmur heard. Pulmonary:      Effort: Pulmonary effort is normal. No respiratory distress. Breath sounds: Normal breath sounds. No wheezing or rales. Chest:      Chest wall: Tenderness (left posterior ribs) present. Musculoskeletal:      Cervical back: Normal range of motion. Comments: Lower legs scaly, 1+ edema, RLE wrapped in ACE. Skin:     General: Skin is warm and dry. Neurological:      General: No focal deficit present. Mental Status: She is alert and oriented to person, place, and time. Mental status is at baseline. Psychiatric:         Mood and Affect: Mood normal.         Behavior: Behavior normal.         Thought Content:  Thought content normal.         Judgment: Judgment normal. ASSESSMENT/PLAN:    1. Frequent falls  - remains high risk. Is in physical therapy for prevention via strengthening of legs and core. She sees how disruptive a fall can be but remains unreliable to call for help when transferring due to encephalopathy. 2. Hypothyroidism (acquired)  - due for thyroid test; on 75 mcg/d  - TSH; Future    3. Pure hypercholesterolemia  - she is unsure if taking pravastatin; assess lipid levels and Natalia Mike will check with NH staff to verify meds  - Comprehensive Metabolic Panel; Future  - Lipid Panel; Future    4. Left-sided chest wall pain  - this is residual rib pain from fracture. Try lidocaine patches    5. Senile osteoporosis  - continue boniva monthly. 6. Lower leg edema  - discussed the necessity of elevating legs at night- sleeping in bed. Continue lasix- check potassium today. Return in about 6 months (around 2/11/2023). An  [employer] was used to authenticate this note.     --Srinivasa Jones MD on 8/11/2022 at 11:33 AM

## 2022-10-31 ENCOUNTER — OFFICE VISIT (OUTPATIENT)
Dept: FAMILY MEDICINE CLINIC | Age: 77
End: 2022-10-31
Payer: COMMERCIAL

## 2022-10-31 VITALS
WEIGHT: 158 LBS | SYSTOLIC BLOOD PRESSURE: 118 MMHG | OXYGEN SATURATION: 95 % | HEART RATE: 94 BPM | DIASTOLIC BLOOD PRESSURE: 68 MMHG | RESPIRATION RATE: 12 BRPM | BODY MASS INDEX: 29.85 KG/M2

## 2022-10-31 DIAGNOSIS — M81.0 SENILE OSTEOPOROSIS: Primary | ICD-10-CM

## 2022-10-31 DIAGNOSIS — M81.0 SENILE OSTEOPOROSIS: ICD-10-CM

## 2022-10-31 DIAGNOSIS — E53.8 B12 DEFICIENCY: ICD-10-CM

## 2022-10-31 DIAGNOSIS — J42 CHRONIC BRONCHITIS, UNSPECIFIED CHRONIC BRONCHITIS TYPE (HCC): ICD-10-CM

## 2022-10-31 DIAGNOSIS — R60.0 LOWER LEG EDEMA: ICD-10-CM

## 2022-10-31 DIAGNOSIS — E03.9 HYPOTHYROIDISM (ACQUIRED): ICD-10-CM

## 2022-10-31 DIAGNOSIS — F31.73 BIPOLAR I DISORDER, CURRENT OR MOST RECENT EPISODE MANIC, IN PARTIAL REMISSION (HCC): ICD-10-CM

## 2022-10-31 LAB
ALBUMIN SERPL-MCNC: 4.8 G/DL (ref 3.4–5)
ANION GAP SERPL CALCULATED.3IONS-SCNC: 13 MMOL/L (ref 3–16)
BUN BLDV-MCNC: 15 MG/DL (ref 7–20)
CALCIUM SERPL-MCNC: 9.5 MG/DL (ref 8.3–10.6)
CHLORIDE BLD-SCNC: 104 MMOL/L (ref 99–110)
CO2: 27 MMOL/L (ref 21–32)
CREAT SERPL-MCNC: 0.7 MG/DL (ref 0.6–1.2)
GFR SERPL CREATININE-BSD FRML MDRD: >60 ML/MIN/{1.73_M2}
GLUCOSE BLD-MCNC: 99 MG/DL (ref 70–99)
PHOSPHORUS: 3.1 MG/DL (ref 2.5–4.9)
POTASSIUM SERPL-SCNC: 4.2 MMOL/L (ref 3.5–5.1)
SODIUM BLD-SCNC: 144 MMOL/L (ref 136–145)
VITAMIN B-12: 334 PG/ML (ref 211–911)
VITAMIN D 25-HYDROXY: 65.9 NG/ML

## 2022-10-31 PROCEDURE — 99214 OFFICE O/P EST MOD 30 MIN: CPT | Performed by: FAMILY MEDICINE

## 2022-10-31 PROCEDURE — 1123F ACP DISCUSS/DSCN MKR DOCD: CPT | Performed by: FAMILY MEDICINE

## 2022-10-31 RX ORDER — FUROSEMIDE 20 MG/1
10 TABLET ORAL DAILY
Qty: 60 TABLET | COMMUNITY
Start: 2022-10-31

## 2022-10-31 RX ORDER — QUETIAPINE FUMARATE 200 MG/1
TABLET, FILM COATED ORAL
COMMUNITY
Start: 2022-10-26

## 2022-10-31 SDOH — ECONOMIC STABILITY: FOOD INSECURITY: WITHIN THE PAST 12 MONTHS, THE FOOD YOU BOUGHT JUST DIDN'T LAST AND YOU DIDN'T HAVE MONEY TO GET MORE.: NEVER TRUE

## 2022-10-31 SDOH — ECONOMIC STABILITY: FOOD INSECURITY: WITHIN THE PAST 12 MONTHS, YOU WORRIED THAT YOUR FOOD WOULD RUN OUT BEFORE YOU GOT MONEY TO BUY MORE.: NEVER TRUE

## 2022-10-31 ASSESSMENT — SOCIAL DETERMINANTS OF HEALTH (SDOH): HOW HARD IS IT FOR YOU TO PAY FOR THE VERY BASICS LIKE FOOD, HOUSING, MEDICAL CARE, AND HEATING?: NOT HARD AT ALL

## 2022-10-31 NOTE — PROGRESS NOTES
10/31/2022    Blood pressure 118/68, pulse 94, resp. rate 12, weight 158 lb (71.7 kg), SpO2 95 %, not currently breastfeeding. Madalyn Garcia (:  1945) is a 69 y.o. female, here for evaluation of the following medical concerns:    Chief Complaint   Patient presents with    Fall     Transferring back to residential care from skilled nursing post ankle fracture    Hypothyroidism     Here with brother Artist Brawn for f/u. Ankle fx in May. Discharged to rehab at [facility], which she recenetly completed and moved back to assisted living at [facility]. Has bigger apartment and is quite happy. Back to walking with a walker. Feels 'fine' she says. Good energy and is eating much better. Walks with rolling walker with ease and without pain. Released form f/u with ortho (DR Aracelis Wong at [facility])    She has known osteoporosis. She is still on Boniva 150. Extra calcium and D. (Doses are unclear on her nursing home records- may only be on 200 IU of vit D.)    Not seeing endo. She has declined infusions for treatment (Reclast)  Last DEXA ordered , but not done  Last DEXA performed was , T score -3.1 femoral neck, -2.8 total hip, -3.5 lumbar spine  Has had fx of ribs, pelvis, ankle. She has fully quit smoking since breaking ankle. Used nicorette gum, but no longer uses that either. Psych meds are prescribed by her nursing home doctor. On Seroquel, Lamictal  Seroquel dose is 250 mg BID, and 25 mg in afternoon. Synthroid dose is 75  Lab Results   Component Value Date    TSH 3.94 2022    TSHREFLEX 2.12 2021      Lasix dose for leg edema is 10 mg justin. Last renal function test: normal.  Lab Results   Component Value Date/Time     2022 12:37 PM    K 4.3 2022 12:37 PM    K 3.9 2022 05:57 PM    BUN 18 2022 12:37 PM    CREATININE 0.6 2022 12:37 PM     Estimated Creatinine Clearance: 72 mL/min (based on SCr of 0.6 mg/dL).      On gabapentin 200mg scheduled TID plus robaxin 1000 q8 hrs as needed for back pain. Tylenol is also offered for pain. On prav 20 for primary prevention. Last lipid test:  Lab Results   Component Value Date    CHOL 179 08/11/2022    TRIG 123 08/11/2022    HDL 73 (H) 08/11/2022    LDLCALC 81 08/11/2022     Lab Results   Component Value Date    ALT 10 08/11/2022    AST 18 08/11/2022     Has had flu and covid vaccination in the NH. Patient Active Problem List   Diagnosis    Hyperlipidemia    Thoracic vertebral fracture- T7 (12/2010)    B12 deficiency    KEE (stress urinary incontinence, female)    Hypothyroidism (acquired)    Wernicke's encephalopathy    Vitamin D deficiency    Nondependent alcohol abuse, in remission    Bipolar I disorder, current or most recent episode manic, in partial remission ([facility] [address]    Hx of breast cancer- 1999, left, lumpectomy/ALND (Dr Woo Hinds)    Lower leg edema    Frequent falls    Senile osteoporosis    Arthritis    Chronic bronchitis (HCC)    Basal cell carcinoma (BCC) of left lower extremity        Body mass index is 29.85 kg/m². Wt Readings from Last 3 Encounters:   10/31/22 158 lb (71.7 kg)   05/17/22 160 lb 7.9 oz (72.8 kg)   11/29/21 173 lb (78.5 kg)       BP Readings from Last 3 Encounters:   10/31/22 118/68   08/11/22 128/60   05/17/22 107/71       Allergies   Allergen Reactions    Codeine        Prior to Visit Medications    Medication Sig Taking?  Authorizing Provider   QUEtiapine (SEROQUEL) 200 MG tablet  Yes Historical Provider, MD   furosemide (LASIX) 20 MG tablet Take 20 mg by mouth daily Yes Historical Provider, MD   lamoTRIgine (LAMICTAL) 100 MG tablet Take 100 mg by mouth 2 times daily Yes Historical Provider, MD   ibandronate (BONIVA) 150 MG tablet Take 1 tablet by mouth every 30 days Yes Shivani Luna MD   levothyroxine (SYNTHROID) 75 MCG tablet Take 1 tablet by mouth Daily Yes Shivani Luna MD   QUEtiapine (SEROQUEL) 50 MG tablet Take 250 mg by mouth 2 times daily  Yes Historical Provider, MD   QUEtiapine (SEROQUEL) 25 MG tablet Take 25 mg by mouth Daily with lunch  Yes Historical Provider, MD   Multiple Vitamins-Minerals (THERAPEUTIC MULTIVITAMIN-MINERALS) tablet Take 1 tablet by mouth daily Yes Historical Provider, MD   acetaminophen (TYLENOL) 325 MG tablet Take 650 mg by mouth every 6 hours as needed for Pain Yes Historical Provider, MD   calcium-vitamin D (OSCAL-500) 500-200 MG-UNIT per tablet Take 1 tablet by mouth 2 times daily Yes Historical Provider, MD   pravastatin (PRAVACHOL) 20 MG tablet TAKE 1 TABLET BY MOUTH ONE TIME A DAY  Yes Stephen Carias MD   albuterol sulfate HFA (PROVENTIL;VENTOLIN;PROAIR) 108 (90 Base) MCG/ACT inhaler Inhale 2 puffs into the lungs every 6 hours as needed for Wheezing  Patient not taking: Reported on 10/31/2022  Historical Provider, MD   haloperidol (HALDOL) 0.5 MG tablet Take 0.5 mg by mouth every 6 hours as needed   Patient not taking: Reported on 10/31/2022  Historical Provider, MD   nicotine polacrilex (NICORETTE) 2 MG gum Take 2 mg by mouth every 4 hours (while awake)  Historical Provider, MD        Social History     Tobacco Use    Smoking status: Former     Packs/day: 1.00     Years: 20.00     Pack years: 20.00     Types: Cigarettes     Quit date: 7/27/2019     Years since quitting: 3.2    Smokeless tobacco: Never   Substance Use Topics    Alcohol use: No     Alcohol/week: 0.0 standard drinks     Comment: hx alcoholism. (has had alcohol in last 2 months)    Drug use: No       Review of Systems    Physical Exam  Vitals and nursing note reviewed. Constitutional:       Appearance: Normal appearance. She is well-developed. HENT:      Mouth/Throat:      Comments: edentulous  Neck:      Thyroid: No thyromegaly. Cardiovascular:      Rate and Rhythm: Normal rate and regular rhythm. Pulses: Normal pulses. Heart sounds: Normal heart sounds. No murmur heard. Pulmonary:      Effort: Pulmonary effort is normal. No respiratory distress. Breath sounds: Normal breath sounds. No wheezing or rales. Musculoskeletal:         General: Normal range of motion. Cervical back: Normal range of motion. Right lower leg: Edema present. Left lower leg: Edema present. Comments: Thoracic kyphosis deformity   Skin:     General: Skin is warm and dry. Neurological:      General: No focal deficit present. Mental Status: She is alert and oriented to person, place, and time. Mental status is at baseline. Psychiatric:         Mood and Affect: Mood normal.         Behavior: Behavior normal.         Thought Content: Thought content normal.         Judgment: Judgment normal.       ASSESSMENT/PLAN:    1. Senile osteoporosis  - with recurrent fractures; may benefit from bone building therapy; referred to endo for eval for that. Continue monthly boniva for now. - after reviewing her vit D supplement dosing at nursing home, it appears to be inadequate. Increased to 1000 IU/d. Check level to see if higher doses would be beneficial.  - discussed that tobacco cessation may be the most important thing she can do to prevent new fractures. - Aurora Estrada MD, Endocrinology, [address] Hydroxy; Future  - Renal Function Panel; Future    2. Bipolar I disorder, current or most recent episode manic, in partial remission ([address]  - mood stable; meds managed per NH staff physician    3. Chronic bronchitis, unspecified chronic bronchitis type ([address]  - lungs clear today. Congratulated on tobacco cessation and encouraged to remain quit for numerous reasons. 4. B12 deficiency  - Vitamin B12; Future    5. Lower leg edema  - stable with lasix and compressoin hose. Encouraged daily walking. 6. Hypothyroidism (acquired)  - Clinically euthyroid; continue current dose of Levothroid. Return in about 6 months (around 4/30/2023) for routine checkup.     An  [employer] was used to authenticate this note.    --Charmaine Newman MD on 10/31/2022 at 1:10 PM

## 2023-01-01 NOTE — TELEPHONE ENCOUNTER
[facility] (nursing home) Calling about sx from 1/13 she has ran out of pain medication and is still in pain would like refill, also they believe there might be infection, green drainage & slight odor. Appt on Friday but they did not want to wait that long please call back at your convenience.      Ann Wu She is fabulous!!   Her growth and developmental are excellent.  We will see her back at 4 months of age

## 2023-02-09 ENCOUNTER — TELEPHONE (OUTPATIENT)
Dept: FAMILY MEDICINE CLINIC | Age: 78
End: 2023-02-09

## 2023-02-09 NOTE — TELEPHONE ENCOUNTER
There was a inconsistence supply they need a secondary diagnosis in order for insurance to cover      Fax is [phone]

## 2023-02-28 ENCOUNTER — TELEPHONE (OUTPATIENT)
Dept: FAMILY MEDICINE CLINIC | Age: 78
End: 2023-02-28

## 2023-02-28 RX ORDER — UNDERPADS 23" X 36"
EACH MISCELLANEOUS
Qty: 88 EACH | Refills: 11 | Status: SHIPPED | OUTPATIENT
Start: 2023-02-28

## 2023-04-11 ENCOUNTER — TELEPHONE (OUTPATIENT)
Dept: FAMILY MEDICINE CLINIC | Age: 78
End: 2023-04-11

## 2023-05-01 ENCOUNTER — OFFICE VISIT (OUTPATIENT)
Dept: FAMILY MEDICINE CLINIC | Age: 78
End: 2023-05-01
Payer: COMMERCIAL

## 2023-05-01 VITALS
OXYGEN SATURATION: 91 % | BODY MASS INDEX: 30.58 KG/M2 | SYSTOLIC BLOOD PRESSURE: 122 MMHG | WEIGHT: 162 LBS | HEART RATE: 68 BPM | RESPIRATION RATE: 12 BRPM | HEIGHT: 61 IN | DIASTOLIC BLOOD PRESSURE: 76 MMHG

## 2023-05-01 DIAGNOSIS — E51.2 WERNICKE'S ENCEPHALOPATHY: ICD-10-CM

## 2023-05-01 DIAGNOSIS — M81.0 SENILE OSTEOPOROSIS: Primary | ICD-10-CM

## 2023-05-01 DIAGNOSIS — N39.3 SUI (STRESS URINARY INCONTINENCE, FEMALE): ICD-10-CM

## 2023-05-01 DIAGNOSIS — E03.9 HYPOTHYROIDISM (ACQUIRED): ICD-10-CM

## 2023-05-01 DIAGNOSIS — E78.00 PURE HYPERCHOLESTEROLEMIA: ICD-10-CM

## 2023-05-01 DIAGNOSIS — F17.200 NICOTINE DEPENDENCE, UNCOMPLICATED, UNSPECIFIED NICOTINE PRODUCT TYPE: ICD-10-CM

## 2023-05-01 DIAGNOSIS — R60.0 LOWER LEG EDEMA: ICD-10-CM

## 2023-05-01 PROCEDURE — 99214 OFFICE O/P EST MOD 30 MIN: CPT | Performed by: FAMILY MEDICINE

## 2023-05-01 PROCEDURE — 1123F ACP DISCUSS/DSCN MKR DOCD: CPT | Performed by: FAMILY MEDICINE

## 2023-05-01 RX ORDER — MELATONIN
2000 DAILY
Qty: 180 TABLET | Refills: 1 | Status: SHIPPED | OUTPATIENT
Start: 2023-05-01

## 2023-05-01 RX ORDER — SENNA AND DOCUSATE SODIUM 50; 8.6 MG/1; MG/1
1 TABLET, FILM COATED ORAL 2 TIMES DAILY
COMMUNITY

## 2023-05-01 RX ORDER — GABAPENTIN 100 MG/1
200 CAPSULE ORAL EVERY 8 HOURS
COMMUNITY

## 2023-05-01 RX ORDER — METHOCARBAMOL 500 MG/1
1000 TABLET, FILM COATED ORAL EVERY 8 HOURS
COMMUNITY

## 2023-05-01 RX ORDER — NYSTATIN 100000 U/G
CREAM TOPICAL 2 TIMES DAILY
COMMUNITY

## 2023-05-01 RX ORDER — BUPROPION HYDROCHLORIDE 75 MG/1
37.5 TABLET ORAL DAILY
COMMUNITY

## 2023-05-01 ASSESSMENT — PATIENT HEALTH QUESTIONNAIRE - PHQ9
10. IF YOU CHECKED OFF ANY PROBLEMS, HOW DIFFICULT HAVE THESE PROBLEMS MADE IT FOR YOU TO DO YOUR WORK, TAKE CARE OF THINGS AT HOME, OR GET ALONG WITH OTHER PEOPLE: 0
2. FEELING DOWN, DEPRESSED OR HOPELESS: 3
SUM OF ALL RESPONSES TO PHQ QUESTIONS 1-9: 10
SUM OF ALL RESPONSES TO PHQ QUESTIONS 1-9: 10
1. LITTLE INTEREST OR PLEASURE IN DOING THINGS: 2
SUM OF ALL RESPONSES TO PHQ9 QUESTIONS 1 & 2: 5
5. POOR APPETITE OR OVEREATING: 0
8. MOVING OR SPEAKING SO SLOWLY THAT OTHER PEOPLE COULD HAVE NOTICED. OR THE OPPOSITE, BEING SO FIGETY OR RESTLESS THAT YOU HAVE BEEN MOVING AROUND A LOT MORE THAN USUAL: 0
SUM OF ALL RESPONSES TO PHQ QUESTIONS 1-9: 10
SUM OF ALL RESPONSES TO PHQ QUESTIONS 1-9: 10
3. TROUBLE FALLING OR STAYING ASLEEP: 2
7. TROUBLE CONCENTRATING ON THINGS, SUCH AS READING THE NEWSPAPER OR WATCHING TELEVISION: 1
9. THOUGHTS THAT YOU WOULD BE BETTER OFF DEAD, OR OF HURTING YOURSELF: 0
4. FEELING TIRED OR HAVING LITTLE ENERGY: 1
6. FEELING BAD ABOUT YOURSELF - OR THAT YOU ARE A FAILURE OR HAVE LET YOURSELF OR YOUR FAMILY DOWN: 1

## 2023-06-06 ENCOUNTER — HOSPITAL ENCOUNTER (OUTPATIENT)
Dept: WOMENS IMAGING | Age: 78
Discharge: HOME | End: 2023-06-06
Payer: COMMERCIAL

## 2023-06-06 DIAGNOSIS — M81.0 SENILE OSTEOPOROSIS: ICD-10-CM

## 2023-06-06 PROCEDURE — 77080 DXA BONE DENSITY AXIAL: CPT

## 2023-06-20 ENCOUNTER — HOSPITAL ENCOUNTER (OUTPATIENT)
Dept: WOMENS IMAGING | Age: 78
Discharge: HOME | End: 2023-06-20
Payer: COMMERCIAL

## 2023-06-20 DIAGNOSIS — Z12.31 VISIT FOR SCREENING MAMMOGRAM: ICD-10-CM

## 2023-06-20 PROCEDURE — 77067 SCR MAMMO BI INCL CAD: CPT

## 2023-08-10 LAB
ALBUMIN SERPL-MCNC: 4.4 G/DL
ALP BLD-CCNC: 66 U/L
ALT SERPL-CCNC: 10 U/L
ANION GAP SERPL CALCULATED.3IONS-SCNC: NORMAL MMOL/L
AST SERPL-CCNC: 17 U/L
BILIRUB SERPL-MCNC: 0.6 MG/DL (ref 0.1–1.4)
BUN BLDV-MCNC: 19 MG/DL
CALCIUM SERPL-MCNC: 9.3 MG/DL
CHLORIDE BLD-SCNC: 104 MMOL/L
CHOLESTEROL, TOTAL: 167 MG/DL
CHOLESTEROL/HDL RATIO: 1.2
CO2: 28 MMOL/L
CREAT SERPL-MCNC: 0.9 MG/DL
EGFR: 61
GLUCOSE BLD-MCNC: 94 MG/DL
HDLC SERPL-MCNC: 64 MG/DL (ref 35–70)
LDL CHOLESTEROL CALCULATED: 79 MG/DL (ref 0–160)
NONHDLC SERPL-MCNC: NORMAL MG/DL
POTASSIUM SERPL-SCNC: 4.2 MMOL/L
SODIUM BLD-SCNC: 144 MMOL/L
TOTAL PROTEIN: 6.4
TRIGL SERPL-MCNC: 122 MG/DL
TSH SERPL DL<=0.05 MIU/L-ACNC: 2.48 UIU/ML
VLDLC SERPL CALC-MCNC: 24 MG/DL

## 2023-08-23 ENCOUNTER — TELEPHONE (OUTPATIENT)
Dept: FAMILY MEDICINE CLINIC | Age: 78
End: 2023-08-23

## 2023-08-23 NOTE — TELEPHONE ENCOUNTER
There was a fax to be sent to Benjamín SON today on [employer] [employer] due to their concern for confusion (order for ua, culture, cbc, cmp). Be sure that was sent. I have been playing phone tag with don today. Will try to reach him again to get his input on his sister's situation.

## 2023-08-25 ENCOUNTER — TELEPHONE (OUTPATIENT)
Dept: FAMILY MEDICINE CLINIC | Age: 78
End: 2023-08-25

## 2023-08-25 NOTE — TELEPHONE ENCOUNTER
Called and spoke with the patients brother and shared that labs were normal and that we were still waiting on the urine.

## 2023-08-28 NOTE — TELEPHONE ENCOUNTER
Spoke with Tram Newman on 08/25/2023 and informed him that we are still waiting on UA results for his sister.

## 2023-08-29 NOTE — TELEPHONE ENCOUNTER
Called and spoke with the Day  and she stated that she will look and see if there is a result for UA. If they do not they will put in an order and have it to us within 24 hours.

## 2023-08-29 NOTE — TELEPHONE ENCOUNTER
Message out to [facility] to have a call back to inquire where the UA is? If there is no order placed a verbal ua dip needs placed.

## 2023-09-05 ENCOUNTER — TELEPHONE (OUTPATIENT)
Dept: FAMILY MEDICINE CLINIC | Age: 78
End: 2023-09-05

## 2023-09-05 NOTE — TELEPHONE ENCOUNTER
Please advise Yuval Foote that he should plan to meet with the psychiatrist that is managing Jaida's psych meds to review her symptoms and need for med adjustments (or not). There does not appear to be a medical reason or infection that is responsible for her behavioral changes.     I am happy to see her in the clinic to look her over more closely for problems (like skin infections, etc)

## 2023-09-05 NOTE — TELEPHONE ENCOUNTER
Called and spoke with Nelligeronimo Villarealsee (brother of patient). He will reach back out to psychiatrist to let office know all urine came back with no infection. No further questions at this time.

## 2023-10-31 ENCOUNTER — APPOINTMENT (OUTPATIENT)
Dept: GENERAL RADIOLOGY | Age: 78
End: 2023-10-31
Payer: COMMERCIAL

## 2023-10-31 ENCOUNTER — TELEPHONE (OUTPATIENT)
Dept: FAMILY MEDICINE CLINIC | Age: 78
End: 2023-10-31

## 2023-10-31 ENCOUNTER — HOSPITAL ENCOUNTER (EMERGENCY)
Age: 78
Discharge: NURSING, REHAB OR LONG-TERM CARE | End: 2023-10-31
Payer: COMMERCIAL

## 2023-10-31 VITALS
SYSTOLIC BLOOD PRESSURE: 113 MMHG | HEART RATE: 82 BPM | DIASTOLIC BLOOD PRESSURE: 48 MMHG | RESPIRATION RATE: 23 BRPM | TEMPERATURE: 98 F | OXYGEN SATURATION: 97 % | BODY MASS INDEX: 27.28 KG/M2 | WEIGHT: 144.4 LBS

## 2023-10-31 DIAGNOSIS — L03.116 BILATERAL CELLULITIS OF LOWER LEG: Primary | ICD-10-CM

## 2023-10-31 DIAGNOSIS — L03.115 BILATERAL CELLULITIS OF LOWER LEG: Primary | ICD-10-CM

## 2023-10-31 DIAGNOSIS — R60.0 BILATERAL LOWER EXTREMITY EDEMA: ICD-10-CM

## 2023-10-31 LAB
ALBUMIN SERPL-MCNC: 3.8 G/DL (ref 3.4–5)
ALBUMIN/GLOB SERPL: 1.6 {RATIO} (ref 1.1–2.2)
ALP SERPL-CCNC: 62 U/L (ref 40–129)
ALT SERPL-CCNC: 13 U/L (ref 10–40)
ANION GAP SERPL CALCULATED.3IONS-SCNC: 9 MMOL/L (ref 3–16)
ANISOCYTOSIS BLD QL SMEAR: ABNORMAL
AST SERPL-CCNC: 42 U/L (ref 15–37)
BASOPHILS # BLD: 0 K/UL (ref 0–0.2)
BASOPHILS NFR BLD: 0 %
BILIRUB SERPL-MCNC: 0.3 MG/DL (ref 0–1)
BUN SERPL-MCNC: 16 MG/DL (ref 7–20)
CALCIUM SERPL-MCNC: 8.9 MG/DL (ref 8.3–10.6)
CHLORIDE SERPL-SCNC: 101 MMOL/L (ref 99–110)
CO2 SERPL-SCNC: 28 MMOL/L (ref 21–32)
CREAT SERPL-MCNC: 0.6 MG/DL (ref 0.6–1.2)
DEPRECATED RDW RBC AUTO: 15.4 % (ref 12.4–15.4)
EOSINOPHIL # BLD: 0 K/UL (ref 0–0.6)
EOSINOPHIL NFR BLD: 0 %
GFR SERPLBLD CREATININE-BSD FMLA CKD-EPI: >60 ML/MIN/{1.73_M2}
GLUCOSE SERPL-MCNC: 87 MG/DL (ref 70–99)
HCT VFR BLD AUTO: 40.4 % (ref 36–48)
HGB BLD-MCNC: 13.6 G/DL (ref 12–16)
LACTATE BLDV-SCNC: 1.2 MMOL/L (ref 0.4–2)
LYMPHOCYTES # BLD: 1.3 K/UL (ref 1–5.1)
LYMPHOCYTES NFR BLD: 24 %
MCH RBC QN AUTO: 30.5 PG (ref 26–34)
MCHC RBC AUTO-ENTMCNC: 33.5 G/DL (ref 31–36)
MCV RBC AUTO: 90.9 FL (ref 80–100)
MONOCYTES # BLD: 0.5 K/UL (ref 0–1.3)
MONOCYTES NFR BLD: 9 %
NEUTROPHILS # BLD: 3.4 K/UL (ref 1.7–7.7)
NEUTROPHILS NFR BLD: 66 %
NT-PROBNP SERPL-MCNC: 453 PG/ML (ref 0–449)
OVALOCYTES BLD QL SMEAR: ABNORMAL
PLATELET # BLD AUTO: 244 K/UL (ref 135–450)
PLATELET BLD QL SMEAR: ABNORMAL
PMV BLD AUTO: 10.5 FL (ref 5–10.5)
POLYCHROMASIA BLD QL SMEAR: ABNORMAL
POTASSIUM SERPL-SCNC: 5.3 MMOL/L (ref 3.5–5.1)
PROT SERPL-MCNC: 6.2 G/DL (ref 6.4–8.2)
RBC # BLD AUTO: 4.45 M/UL (ref 4–5.2)
SCHISTOCYTES BLD QL SMEAR: ABNORMAL
SLIDE REVIEW: ABNORMAL
SODIUM SERPL-SCNC: 138 MMOL/L (ref 136–145)
TOXIC GRANULES BLD QL SMEAR: PRESENT
TROPONIN, HIGH SENSITIVITY: 10 NG/L (ref 0–14)
VARIANT LYMPHS NFR BLD MANUAL: 1 % (ref 0–6)
WBC # BLD AUTO: 5.1 K/UL (ref 4–11)

## 2023-10-31 PROCEDURE — 85025 COMPLETE CBC W/AUTO DIFF WBC: CPT

## 2023-10-31 PROCEDURE — 83605 ASSAY OF LACTIC ACID: CPT

## 2023-10-31 PROCEDURE — 80053 COMPREHEN METABOLIC PANEL: CPT

## 2023-10-31 PROCEDURE — 73590 X-RAY EXAM OF LOWER LEG: CPT

## 2023-10-31 PROCEDURE — 6370000000 HC RX 637 (ALT 250 FOR IP): Performed by: NURSE PRACTITIONER

## 2023-10-31 PROCEDURE — 83880 ASSAY OF NATRIURETIC PEPTIDE: CPT

## 2023-10-31 PROCEDURE — 71045 X-RAY EXAM CHEST 1 VIEW: CPT

## 2023-10-31 PROCEDURE — 93005 ELECTROCARDIOGRAM TRACING: CPT | Performed by: NURSE PRACTITIONER

## 2023-10-31 PROCEDURE — 84484 ASSAY OF TROPONIN QUANT: CPT

## 2023-10-31 PROCEDURE — 99285 EMERGENCY DEPT VISIT HI MDM: CPT

## 2023-10-31 RX ORDER — CEPHALEXIN 500 MG/1
500 CAPSULE ORAL ONCE
Status: COMPLETED | OUTPATIENT
Start: 2023-10-31 | End: 2023-10-31

## 2023-10-31 RX ORDER — CEPHALEXIN 500 MG/1
500 CAPSULE ORAL 2 TIMES DAILY
Qty: 14 CAPSULE | Refills: 0 | Status: SHIPPED | OUTPATIENT
Start: 2023-10-31 | End: 2023-10-31 | Stop reason: SDUPTHER

## 2023-10-31 RX ORDER — CEPHALEXIN 500 MG/1
500 CAPSULE ORAL 2 TIMES DAILY
Qty: 14 CAPSULE | Refills: 0 | Status: SHIPPED | OUTPATIENT
Start: 2023-10-31 | End: 2023-11-07

## 2023-10-31 RX ADMIN — CEPHALEXIN 500 MG: 500 CAPSULE ORAL at 22:40

## 2023-10-31 ASSESSMENT — PAIN - FUNCTIONAL ASSESSMENT: PAIN_FUNCTIONAL_ASSESSMENT: NONE - DENIES PAIN

## 2023-10-31 NOTE — TELEPHONE ENCOUNTER
RN in Assisted Living at [facility] calling in, stated that pt has been having bilateral lower extremity edema since 10.28.2023 according to chart. It is warm to the touch, solid, painful, and redness. They has been elevating her legs and they have been limiting fluids without much change. Possible cellulitis. Would like to know if med can be sent in or what to do for pt.     Triaged pt  Per Nancy Saucedo send message    [facility] [id number]    Please advise  Kiera Singh can be reached at [phone]

## 2023-10-31 NOTE — TELEPHONE ENCOUNTER
Called and spoke with Claude Manns. Patient is having a lot of pain, very tender to the touch on both legs. Nurse feels that she may have Cellulitis. Patient was referred on to [facility] ED. No further questions at this time.

## 2023-10-31 NOTE — TELEPHONE ENCOUNTER
Yes, could be lots of thinks (clots, infection, stasis dermatitis, liver or heart failure or kidney failure).   Urgent evaluation is best.

## 2023-11-01 NOTE — ED NOTES
Pt was transfer from bed stretcher by [facility] staff with no difficulties, all paper work sent with patient. No questions at this time.      Jovana Da Silva RN  10/31/23 8614       Jovana Da Silva RN  10/31/23 1777

## 2023-11-01 NOTE — ED PROVIDER NOTES
EKG: Normal sinus rhythm, rate of 73. Nonspecific T wave changes. Poor R wave progression. Rhythm strip shows a sinus rhythm with a rate of 73, MS interval 182 ms, QRS of 80 ms with no other ectopy as interpreted by me. Paired to 5/17/2022 previously noted sinus tachycardia has resolved. Nonspecific T wave changes are slightly more pronounced on today's tracing.      Jean Carlos Gage MD  10/31/23 6293

## 2023-11-02 LAB
EKG ATRIAL RATE: 87 BPM
EKG DIAGNOSIS: NORMAL
EKG P AXIS: 30 DEGREES
EKG P-R INTERVAL: 182 MS
EKG Q-T INTERVAL: 392 MS
EKG QRS DURATION: 130 MS
EKG QTC CALCULATION (BAZETT): 471 MS
EKG R AXIS: -26 DEGREES
EKG T AXIS: 1 DEGREES
EKG VENTRICULAR RATE: 87 BPM

## 2023-11-02 PROCEDURE — 93010 ELECTROCARDIOGRAM REPORT: CPT | Performed by: INTERNAL MEDICINE

## 2023-11-06 ASSESSMENT — ENCOUNTER SYMPTOMS
BACK PAIN: 0
ABDOMINAL PAIN: 0
ANAL BLEEDING: 0
NAUSEA: 0
EYE PAIN: 0
VOMITING: 0
SHORTNESS OF BREATH: 1
SORE THROAT: 0
COLOR CHANGE: 1
COUGH: 0

## 2023-11-13 ENCOUNTER — HOSPITAL ENCOUNTER (EMERGENCY)
Age: 78
Discharge: HOME | End: 2023-11-13
Attending: EMERGENCY MEDICINE
Payer: COMMERCIAL

## 2023-11-13 VITALS
BODY MASS INDEX: 25.61 KG/M2 | DIASTOLIC BLOOD PRESSURE: 55 MMHG | OXYGEN SATURATION: 95 % | SYSTOLIC BLOOD PRESSURE: 111 MMHG | RESPIRATION RATE: 18 BRPM | TEMPERATURE: 97.6 F | HEART RATE: 64 BPM | WEIGHT: 150 LBS | HEIGHT: 64 IN

## 2023-11-13 DIAGNOSIS — M79.605 BILATERAL LEG PAIN: Primary | ICD-10-CM

## 2023-11-13 DIAGNOSIS — M79.604 BILATERAL LEG PAIN: Primary | ICD-10-CM

## 2023-11-13 LAB
ANION GAP SERPL CALCULATED.3IONS-SCNC: 11 MMOL/L (ref 3–16)
BASOPHILS # BLD: 0 K/UL (ref 0–0.2)
BASOPHILS NFR BLD: 0.1 %
BUN SERPL-MCNC: 18 MG/DL (ref 7–20)
CALCIUM SERPL-MCNC: 9 MG/DL (ref 8.3–10.6)
CHLORIDE SERPL-SCNC: 106 MMOL/L (ref 99–110)
CO2 SERPL-SCNC: 25 MMOL/L (ref 21–32)
CREAT SERPL-MCNC: 0.6 MG/DL (ref 0.6–1.2)
DEPRECATED RDW RBC AUTO: 15.8 % (ref 12.4–15.4)
EOSINOPHIL # BLD: 0 K/UL (ref 0–0.6)
EOSINOPHIL NFR BLD: 0.2 %
GFR SERPLBLD CREATININE-BSD FMLA CKD-EPI: >60 ML/MIN/{1.73_M2}
GLUCOSE SERPL-MCNC: 85 MG/DL (ref 70–99)
HCT VFR BLD AUTO: 38.7 % (ref 36–48)
HGB BLD-MCNC: 12.8 G/DL (ref 12–16)
INR PPP: 1.09 (ref 0.84–1.16)
LACTATE BLDV-SCNC: 1 MMOL/L (ref 0.4–2)
LYMPHOCYTES # BLD: 1.2 K/UL (ref 1–5.1)
LYMPHOCYTES NFR BLD: 30.1 %
MCH RBC QN AUTO: 30.2 PG (ref 26–34)
MCHC RBC AUTO-ENTMCNC: 33 G/DL (ref 31–36)
MCV RBC AUTO: 91.4 FL (ref 80–100)
MONOCYTES # BLD: 0.7 K/UL (ref 0–1.3)
MONOCYTES NFR BLD: 17.1 %
NEUTROPHILS # BLD: 2 K/UL (ref 1.7–7.7)
NEUTROPHILS NFR BLD: 52.5 %
NT-PROBNP SERPL-MCNC: 780 PG/ML (ref 0–449)
PLATELET # BLD AUTO: 207 K/UL (ref 135–450)
PMV BLD AUTO: 8.8 FL (ref 5–10.5)
POTASSIUM SERPL-SCNC: 3.9 MMOL/L (ref 3.5–5.1)
PROTHROMBIN TIME: 14.1 SEC (ref 11.5–14.8)
RBC # BLD AUTO: 4.23 M/UL (ref 4–5.2)
SODIUM SERPL-SCNC: 142 MMOL/L (ref 136–145)
WBC # BLD AUTO: 3.9 K/UL (ref 4–11)

## 2023-11-13 PROCEDURE — 85610 PROTHROMBIN TIME: CPT

## 2023-11-13 PROCEDURE — 85025 COMPLETE CBC W/AUTO DIFF WBC: CPT

## 2023-11-13 PROCEDURE — 99283 EMERGENCY DEPT VISIT LOW MDM: CPT

## 2023-11-13 PROCEDURE — 6370000000 HC RX 637 (ALT 250 FOR IP): Performed by: EMERGENCY MEDICINE

## 2023-11-13 PROCEDURE — 80048 BASIC METABOLIC PNL TOTAL CA: CPT

## 2023-11-13 PROCEDURE — 83605 ASSAY OF LACTIC ACID: CPT

## 2023-11-13 PROCEDURE — 83880 ASSAY OF NATRIURETIC PEPTIDE: CPT

## 2023-11-13 RX ORDER — OXYCODONE HYDROCHLORIDE AND ACETAMINOPHEN 5; 325 MG/1; MG/1
1 TABLET ORAL ONCE
Status: COMPLETED | OUTPATIENT
Start: 2023-11-13 | End: 2023-11-13

## 2023-11-13 RX ORDER — FUROSEMIDE 40 MG/1
20 TABLET ORAL ONCE
Status: COMPLETED | OUTPATIENT
Start: 2023-11-13 | End: 2023-11-13

## 2023-11-13 RX ADMIN — FUROSEMIDE 20 MG: 40 TABLET ORAL at 03:04

## 2023-11-13 RX ADMIN — OXYCODONE AND ACETAMINOPHEN 1 TABLET: 5; 325 TABLET ORAL at 02:05

## 2023-11-13 ASSESSMENT — PAIN - FUNCTIONAL ASSESSMENT
PAIN_FUNCTIONAL_ASSESSMENT: NONE - DENIES PAIN
PAIN_FUNCTIONAL_ASSESSMENT: NONE - DENIES PAIN

## 2023-11-13 ASSESSMENT — LIFESTYLE VARIABLES
HOW MANY STANDARD DRINKS CONTAINING ALCOHOL DO YOU HAVE ON A TYPICAL DAY: PATIENT DOES NOT DRINK
HOW OFTEN DO YOU HAVE A DRINK CONTAINING ALCOHOL: NEVER

## 2023-11-13 NOTE — ED NOTES
Call placed to facility that patient lives. Spoke with duty nurse, Greg Diallo, who was updated with plan to discharge and if they are able to assist patient from the curb if sent back by cab or Lyft. Greg Diallo is agreeable for their staff to assist patient back into facility. Plan is to medicate patient, discharge and request Lyft for transport.      Austin Cee RN  11/13/23 0921

## 2023-11-13 NOTE — ED NOTES
Patient is scheduled for discharge and is not eligible for ambulance back to facility. Contacted patient's son about transport back home. Son is not willing to come pick her up citing she is a fall risk and he is not strong enough to help her. Communicated with patient who advises there are no other family members that can be called. Will explore other options.      Urbano Mota RN  11/13/23 2561

## 2023-11-13 NOTE — ED NOTES
Discharge and education instructions reviewed. Patient verbalized understanding, teach-back successful. Patient denied questions at this time. No acute distress noted. Patient instructed to follow-up as noted - return to emergency department if symptoms worsen. Patient verbalized understanding. Discharged per EDMD with discharge instructions. Paperwork given to patient with knowledge to give paperwork to facility staff on return. Reinforced same when speaking with facility staff, Monica Dang, duty nurse.      Karmen Pena RN  11/13/23 8551

## 2023-11-13 NOTE — ED PROVIDER NOTES
I PERSONALLY SAW THE PATIENT AND PERFORMED A SUBSTANTIVE PORTION OF THE VISIT INCLUDING ALL ASPECTS OF THE MEDICAL DECISION MAKING PROCESS. [address] [place] [facility]\"" [address] [address]      Pt Name: Elba Wang  MRN: [id number]  [address] [facility] 1945  Date of evaluation: 11/13/2023  Provider: Elias Chaudhari MD    CHIEF COMPLAINT       Chief Complaint   Patient presents with    Leg Pain     Bilateral, recent treatment for cellulitis. HISTORY OF PRESENT ILLNESS    Elba Wang is a 69 y.o. female who presents to the emergency department with bilateral leg swelling. Patient presents with bilateral leg swelling. Has been going on chronically. Has been taking Lasix intermittently. States pain was significant tonight so came to the emergency room. No increased redness, fevers, chills. Symmetric swelling. History of venous insufficiency alcoholism, hypervolemia. No other associated symptoms. Nursing Notes were reviewed. Including nursing noted for FM, Surgical History, Past Medical History, Social History, vitals, and allergies; agree with all. REVIEW OF SYSTEMS       Review of Systems    Except as noted above the remainder of the review of systems was reviewed and negative.      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

## 2023-11-13 NOTE — ED NOTES
Report from facility that patient resides at. All questions answered at this time. Paperwork also faxed to ED that EMS did not bring to ED.      Latha Hayden RN  11/13/23 7099

## 2023-11-13 NOTE — ED NOTES
Patient into Lyft with assistance of this RN.  advised that facility is aware and willing to help patient from vehicle and into facility. Facility called, again spoke with Raphael Lee. Raphael Lee advised of approximate 10 minute ETA and given description of vehicle. Raphael Lee again endorses agreement to assist patient from car into facility.      Larissa Sanchez RN  11/13/23 0245

## 2023-11-14 ENCOUNTER — TELEPHONE (OUTPATIENT)
Dept: FAMILY MEDICINE CLINIC | Age: 78
End: 2023-11-14

## 2023-11-14 NOTE — TELEPHONE ENCOUNTER
Please call [facility] and see if he can bring pt in for appt in the next week or so  Thank you!!

## 2023-11-14 NOTE — TELEPHONE ENCOUNTER
I did not see that. .  She has had several ED visits recently for cellulitis and leg edema  A sooner appt would be better, if Anastasiya Alonso is able to bring her  Thank you

## 2023-11-14 NOTE — TELEPHONE ENCOUNTER
Nursing home called pt has cellitus   in her leg the medications she was on was only for 7 days and she still has the infection they want to know if something else can be sent to pharmacy       Carol Ann Collins   [phone]

## 2023-11-14 NOTE — TELEPHONE ENCOUNTER
AYLIN    She has been in ED and treated w abx for cellulitis on 10/31  Return visits to ED on 11/07 and 11/13 for leg swelling. Was not prescribed abx on 11/13. I called Audrey Goodell at nursing facility and she is concerned that left leg and foot are swollen and red. Pt is manic right now and refuses to elevate legs or keep compression hose on. Only taking 10 mg Lasix daily. Explained that pt has not been seen by pcp since May and was told her brother will be calling when he is back in town to schedule. [facility] and she will [facility] if needed.

## 2023-11-14 NOTE — TELEPHONE ENCOUNTER
Thanks, David Mota. Reviewed her ER visit. Her main problem is the chronic leg edema. She would benefit from an adjustable leg compression system like this:      Perhaps her brother Debbie Joe can buy this and the NH staff can help get it on and off of her each day.

## 2023-11-16 ENCOUNTER — OFFICE VISIT (OUTPATIENT)
Dept: FAMILY MEDICINE CLINIC | Age: 78
End: 2023-11-16
Payer: COMMERCIAL

## 2023-11-16 VITALS
WEIGHT: 145.6 LBS | HEART RATE: 80 BPM | OXYGEN SATURATION: 97 % | HEIGHT: 64 IN | SYSTOLIC BLOOD PRESSURE: 104 MMHG | TEMPERATURE: 97.8 F | BODY MASS INDEX: 24.86 KG/M2 | RESPIRATION RATE: 18 BRPM | DIASTOLIC BLOOD PRESSURE: 70 MMHG

## 2023-11-16 DIAGNOSIS — R60.0 LOWER LEG EDEMA: ICD-10-CM

## 2023-11-16 DIAGNOSIS — R79.89 ELEVATED BRAIN NATRIURETIC PEPTIDE (BNP) LEVEL: ICD-10-CM

## 2023-11-16 DIAGNOSIS — R60.0 LOWER LEG EDEMA: Primary | ICD-10-CM

## 2023-11-16 LAB
ANION GAP SERPL CALCULATED.3IONS-SCNC: 8 MMOL/L (ref 3–16)
BUN SERPL-MCNC: 13 MG/DL (ref 7–20)
CALCIUM SERPL-MCNC: 9.6 MG/DL (ref 8.3–10.6)
CHLORIDE SERPL-SCNC: 99 MMOL/L (ref 99–110)
CO2 SERPL-SCNC: 31 MMOL/L (ref 21–32)
CREAT SERPL-MCNC: 0.6 MG/DL (ref 0.6–1.2)
GFR SERPLBLD CREATININE-BSD FMLA CKD-EPI: >60 ML/MIN/{1.73_M2}
GLUCOSE SERPL-MCNC: 91 MG/DL (ref 70–99)
NT-PROBNP SERPL-MCNC: 535 PG/ML (ref 0–449)
POTASSIUM SERPL-SCNC: 4.4 MMOL/L (ref 3.5–5.1)
SODIUM SERPL-SCNC: 138 MMOL/L (ref 136–145)
TSH SERPL DL<=0.005 MIU/L-ACNC: 2.51 UIU/ML (ref 0.27–4.2)

## 2023-11-16 PROCEDURE — 99214 OFFICE O/P EST MOD 30 MIN: CPT | Performed by: FAMILY MEDICINE

## 2023-11-16 PROCEDURE — 1123F ACP DISCUSS/DSCN MKR DOCD: CPT | Performed by: FAMILY MEDICINE

## 2023-11-16 RX ORDER — FUROSEMIDE 20 MG/1
20 TABLET ORAL DAILY
COMMUNITY
Start: 2023-11-16

## 2023-11-16 SDOH — ECONOMIC STABILITY: HOUSING INSECURITY
IN THE LAST 12 MONTHS, WAS THERE A TIME WHEN YOU DID NOT HAVE A STEADY PLACE TO SLEEP OR SLEPT IN A SHELTER (INCLUDING NOW)?: NO

## 2023-11-16 SDOH — ECONOMIC STABILITY: FOOD INSECURITY: WITHIN THE PAST 12 MONTHS, YOU WORRIED THAT YOUR FOOD WOULD RUN OUT BEFORE YOU GOT MONEY TO BUY MORE.: NEVER TRUE

## 2023-11-16 SDOH — ECONOMIC STABILITY: INCOME INSECURITY: HOW HARD IS IT FOR YOU TO PAY FOR THE VERY BASICS LIKE FOOD, HOUSING, MEDICAL CARE, AND HEATING?: NOT HARD AT ALL

## 2023-11-16 SDOH — ECONOMIC STABILITY: FOOD INSECURITY: WITHIN THE PAST 12 MONTHS, THE FOOD YOU BOUGHT JUST DIDN'T LAST AND YOU DIDN'T HAVE MONEY TO GET MORE.: NEVER TRUE

## 2024-03-07 ENCOUNTER — OFFICE VISIT (OUTPATIENT)
Dept: FAMILY MEDICINE CLINIC | Age: 79
End: 2024-03-07
Payer: COMMERCIAL

## 2024-03-07 VITALS
OXYGEN SATURATION: 97 % | RESPIRATION RATE: 18 BRPM | HEART RATE: 62 BPM | DIASTOLIC BLOOD PRESSURE: 60 MMHG | TEMPERATURE: 97.5 F | SYSTOLIC BLOOD PRESSURE: 110 MMHG

## 2024-03-07 DIAGNOSIS — E53.8 B12 DEFICIENCY: ICD-10-CM

## 2024-03-07 DIAGNOSIS — R60.0 LOWER LEG EDEMA: Primary | ICD-10-CM

## 2024-03-07 DIAGNOSIS — R63.4 WEIGHT LOSS, NON-INTENTIONAL: ICD-10-CM

## 2024-03-07 DIAGNOSIS — E78.00 PURE HYPERCHOLESTEROLEMIA: ICD-10-CM

## 2024-03-07 DIAGNOSIS — M81.0 SENILE OSTEOPOROSIS: ICD-10-CM

## 2024-03-07 DIAGNOSIS — H53.8 BLURRY VISION, RIGHT EYE: ICD-10-CM

## 2024-03-07 DIAGNOSIS — F17.200 NICOTINE DEPENDENCE, UNCOMPLICATED, UNSPECIFIED NICOTINE PRODUCT TYPE: ICD-10-CM

## 2024-03-07 DIAGNOSIS — E03.9 HYPOTHYROIDISM (ACQUIRED): ICD-10-CM

## 2024-03-07 DIAGNOSIS — R60.0 LOWER LEG EDEMA: ICD-10-CM

## 2024-03-07 LAB
ALBUMIN SERPL-MCNC: 4.5 G/DL (ref 3.4–5)
ALBUMIN/GLOB SERPL: 2.4 {RATIO} (ref 1.1–2.2)
ALP SERPL-CCNC: 75 U/L (ref 40–129)
ALT SERPL-CCNC: 11 U/L (ref 10–40)
ANION GAP SERPL CALCULATED.3IONS-SCNC: 10 MMOL/L (ref 3–16)
AST SERPL-CCNC: 22 U/L (ref 15–37)
BILIRUB SERPL-MCNC: 0.7 MG/DL (ref 0–1)
BUN SERPL-MCNC: 23 MG/DL (ref 7–20)
CALCIUM SERPL-MCNC: 9.6 MG/DL (ref 8.3–10.6)
CHLORIDE SERPL-SCNC: 98 MMOL/L (ref 99–110)
CHOLEST SERPL-MCNC: 181 MG/DL (ref 0–199)
CO2 SERPL-SCNC: 34 MMOL/L (ref 21–32)
CREAT SERPL-MCNC: 0.9 MG/DL (ref 0.6–1.2)
CRP SERPL-MCNC: <3 MG/L (ref 0–5.1)
GFR SERPLBLD CREATININE-BSD FMLA CKD-EPI: >60 ML/MIN/{1.73_M2}
GLUCOSE SERPL-MCNC: 78 MG/DL (ref 70–99)
HDLC SERPL-MCNC: 73 MG/DL (ref 40–60)
LDLC SERPL CALC-MCNC: 89 MG/DL
POTASSIUM SERPL-SCNC: 4.3 MMOL/L (ref 3.5–5.1)
PROT SERPL-MCNC: 6.4 G/DL (ref 6.4–8.2)
SODIUM SERPL-SCNC: 142 MMOL/L (ref 136–145)
TRIGL SERPL-MCNC: 95 MG/DL (ref 0–150)
TSH SERPL DL<=0.005 MIU/L-ACNC: 0.88 UIU/ML (ref 0.27–4.2)
VIT B12 SERPL-MCNC: 497 PG/ML (ref 211–911)
VLDLC SERPL CALC-MCNC: 19 MG/DL

## 2024-03-07 PROCEDURE — 1123F ACP DISCUSS/DSCN MKR DOCD: CPT | Performed by: FAMILY MEDICINE

## 2024-03-07 PROCEDURE — 99214 OFFICE O/P EST MOD 30 MIN: CPT | Performed by: FAMILY MEDICINE

## 2024-03-07 NOTE — PROGRESS NOTES
3/7/2024    Blood pressure 110/60, pulse 62, temperature 97.5 °F (36.4 °C), temperature source Oral, resp. rate 18, SpO2 97 %, not currently breastfeeding.    Jaida Farias (:  1945) is a 78 y.o. female, here for evaluation of the following medical concerns:    Chief Complaint   Patient presents with    Follow-up     Follow up on Edema.  Patient has not had eye exam in about 3 years and is having blurry vision in R eye. Patient stated she has been reading a lot lately.     Here with brother for follow up.    Likes to read. She visits son and grand kids monthly or so.  Likes to sit in ofe windows.  Living at [place]. Has own apartment.    Food is 'not good'  but plentiful.  And she can order from a menu.  Walks with walker.    No falls.    Has a couple friends she talks to daily.    She is still not smoking, but that is not by her wishes.  Does get nicotine gum.  But 'has no money' for cigarettes.  Is breathing easier.    In November her leg edema was dramatically worse, but is currently about as good as she gets.    Not wearing stockings- refuses.  Does not walk much. Mostly sits. Walks to cafeteria 2 x a day, but that close by.    For edema uses lasix 20.   Last renal function test:   Lab Results   Component Value Date/Time     2023 02:51 PM    K 4.4 2023 02:51 PM    K 3.9 2023 02:01 AM    CL 99 2023 02:51 PM    CO2 31 2023 02:51 PM    BUN 13 2023 02:51 PM    CREATININE 0.6 2023 02:51 PM    GLUCOSE 91 2023 02:51 PM    GLUCOSE 85 2011 03:45 PM    CALCIUM 9.6 2023 02:51 PM    LABGLOM >60 2023 02:51 PM    LABGLOM 61 08/10/2023 12:00 AM          Thyroid level stable on current dose of synthroid (75 mcg).    Lab Results   Component Value Date    TSH 2.51 2023    TSHREFLEX 2.12 2021      No chest pains, dizziness, heart palpitations, dyspnea, lightheadedness, worsening edema.     Weight today was 123.8 lbs.  This is 20 lbs

## 2024-03-08 LAB — ERYTHROCYTE [SEDIMENTATION RATE] IN BLOOD BY WESTERGREN METHOD: 3 MM/HR (ref 0–30)

## 2024-05-30 ENCOUNTER — OFFICE VISIT (OUTPATIENT)
Dept: FAMILY MEDICINE CLINIC | Age: 79
End: 2024-05-30
Payer: COMMERCIAL

## 2024-05-30 VITALS
TEMPERATURE: 97.4 F | DIASTOLIC BLOOD PRESSURE: 60 MMHG | HEART RATE: 98 BPM | RESPIRATION RATE: 18 BRPM | SYSTOLIC BLOOD PRESSURE: 104 MMHG | HEIGHT: 62 IN | BODY MASS INDEX: 23.45 KG/M2 | WEIGHT: 127.4 LBS | OXYGEN SATURATION: 96 %

## 2024-05-30 DIAGNOSIS — E03.9 HYPOTHYROIDISM (ACQUIRED): ICD-10-CM

## 2024-05-30 DIAGNOSIS — H25.011 CORTICAL AGE-RELATED CATARACT OF RIGHT EYE: ICD-10-CM

## 2024-05-30 DIAGNOSIS — Z01.818 PREOP EXAMINATION: Primary | ICD-10-CM

## 2024-05-30 DIAGNOSIS — F31.73 BIPOLAR I DISORDER, CURRENT OR MOST RECENT EPISODE MANIC, IN PARTIAL REMISSION (HCC): ICD-10-CM

## 2024-05-30 DIAGNOSIS — R60.0 LOWER LEG EDEMA: ICD-10-CM

## 2024-05-30 DIAGNOSIS — J42 CHRONIC BRONCHITIS, UNSPECIFIED CHRONIC BRONCHITIS TYPE (HCC): ICD-10-CM

## 2024-05-30 PROCEDURE — 1123F ACP DISCUSS/DSCN MKR DOCD: CPT | Performed by: FAMILY MEDICINE

## 2024-05-30 PROCEDURE — 99214 OFFICE O/P EST MOD 30 MIN: CPT | Performed by: FAMILY MEDICINE

## 2024-05-30 PROCEDURE — G2211 COMPLEX E/M VISIT ADD ON: HCPCS | Performed by: FAMILY MEDICINE

## 2024-05-30 ASSESSMENT — PATIENT HEALTH QUESTIONNAIRE - PHQ9
1. LITTLE INTEREST OR PLEASURE IN DOING THINGS: NEARLY EVERY DAY
7. TROUBLE CONCENTRATING ON THINGS, SUCH AS READING THE NEWSPAPER OR WATCHING TELEVISION: NOT AT ALL
9. THOUGHTS THAT YOU WOULD BE BETTER OFF DEAD, OR OF HURTING YOURSELF: NOT AT ALL
3. TROUBLE FALLING OR STAYING ASLEEP: NEARLY EVERY DAY
SUM OF ALL RESPONSES TO PHQ QUESTIONS 1-9: 10
SUM OF ALL RESPONSES TO PHQ9 QUESTIONS 1 & 2: 6
8. MOVING OR SPEAKING SO SLOWLY THAT OTHER PEOPLE COULD HAVE NOTICED. OR THE OPPOSITE, BEING SO FIGETY OR RESTLESS THAT YOU HAVE BEEN MOVING AROUND A LOT MORE THAN USUAL: NOT AT ALL
SUM OF ALL RESPONSES TO PHQ QUESTIONS 1-9: 10
2. FEELING DOWN, DEPRESSED OR HOPELESS: NEARLY EVERY DAY
SUM OF ALL RESPONSES TO PHQ QUESTIONS 1-9: 10
5. POOR APPETITE OR OVEREATING: NOT AT ALL
4. FEELING TIRED OR HAVING LITTLE ENERGY: SEVERAL DAYS
SUM OF ALL RESPONSES TO PHQ QUESTIONS 1-9: 10
10. IF YOU CHECKED OFF ANY PROBLEMS, HOW DIFFICULT HAVE THESE PROBLEMS MADE IT FOR YOU TO DO YOUR WORK, TAKE CARE OF THINGS AT HOME, OR GET ALONG WITH OTHER PEOPLE: NOT DIFFICULT AT ALL
6. FEELING BAD ABOUT YOURSELF - OR THAT YOU ARE A FAILURE OR HAVE LET YOURSELF OR YOUR FAMILY DOWN: NOT AT ALL

## 2024-05-30 NOTE — PATIENT INSTRUCTIONS
Immunizations to get at your pharmacy:        Meds to stop before surgery:   Only need to hold all meds the morning of surgery and resume after.

## 2024-05-30 NOTE — PROGRESS NOTES
wheezing or rales.   Abdominal:      General: Abdomen is flat. There is no distension.      Palpations: Abdomen is soft.   Musculoskeletal:         General: Normal range of motion.      Cervical back: Normal range of motion.   Skin:     General: Skin is warm and dry.   Neurological:      General: No focal deficit present.      Mental Status: She is alert and oriented to person, place, and time. Mental status is at baseline.   Psychiatric:         Mood and Affect: Mood normal.         Behavior: Behavior normal.         Thought Content: Thought content normal.         Judgment: Judgment normal.         Cardiographics  ECG:     Normal sinus rhythmLeft ventricular hypertrophy with QRS wideningCannot rule out Anterior infarctAbnormal ECGWhen compared with ECG of 17-MAY-2022 17:58,QRS duration has increased  changes suggestive of anterior MI are seen.Confirmed by LUIS HARRIS MD ([id number]) on 11/2/2023 5:26:01 PM       Lab Review   No visits with results within 2 Month(s) from this visit.   Latest known visit with results is:   Orders Only on 03/07/2024   Component Date Value    Vitamin B-12 03/07/2024 497     TSH 03/07/2024 0.88     Sodium 03/07/2024 142     Potassium 03/07/2024 4.3     Chloride 03/07/2024 98 (L)     CO2 03/07/2024 34 (H)     Anion Gap 03/07/2024 10     Glucose 03/07/2024 78     BUN 03/07/2024 23 (H)     Creatinine 03/07/2024 0.9     Est, Glom Filt Rate 03/07/2024 >60     Calcium 03/07/2024 9.6     Total Protein 03/07/2024 6.4     Albumin 03/07/2024 4.5     Albumin/Globulin Ratio 03/07/2024 2.4 (H)     Total Bilirubin 03/07/2024 0.7     Alkaline Phosphatase 03/07/2024 75     ALT 03/07/2024 11     AST 03/07/2024 22     CRP 03/07/2024 <3.0     Sed Rate, Automated 03/07/2024 3     Cholesterol, Total 03/07/2024 181     Triglycerides 03/07/2024 95     HDL 03/07/2024 73 (H)     LDL Calculated 03/07/2024 89     VLDL Cholesterol Calcula* 03/07/2024 19           Assessment:     78 y.o. y.o. female with planned

## 2024-08-30 ENCOUNTER — TELEPHONE (OUTPATIENT)
Dept: FAMILY MEDICINE CLINIC | Age: 79
End: 2024-08-30

## 2024-08-30 NOTE — TELEPHONE ENCOUNTER
[facility] wanting to let Dr. Tran know that this patient fell today, but she has no injuries    Beatrice can be reached at [phone]    Counts include 234 beds at the [facility]

## 2024-09-04 ENCOUNTER — TELEPHONE (OUTPATIENT)
Dept: FAMILY MEDICINE CLINIC | Age: 79
End: 2024-09-04

## 2024-11-06 ENCOUNTER — TELEPHONE (OUTPATIENT)
Dept: FAMILY MEDICINE CLINIC | Age: 79
End: 2024-11-06

## 2024-11-06 NOTE — TELEPHONE ENCOUNTER
Do you know who has guardianship for the patient? I looked in chart and did not see anything scanned into chart.

## 2024-11-06 NOTE — TELEPHONE ENCOUNTER
Brother Paolo Craig is the person who took care of Jaida and brought her to all her visits.  She has 2 children as next of kin.  I don't think she had a guardian.

## 2024-11-06 NOTE — TELEPHONE ENCOUNTER
Nanette from [employer] calling in, stated that she has a POA form for pt being the brother and son. They would like to know who has guardianship over pt.    Looked and did not see anything for Guardianship, We have POA    Nanette stated she would like to know if Dr. Tran knows who had Guardianship.  Nanette can be reached at [phone]

## 2025-04-16 NOTE — TELEPHONE ENCOUNTER
Patient stating she is having increased anxiety and mood changes. Wanted to get Thyroid test.  Per Dr. Teri Goss, can Draw a TSH for her. Called them and gave verbal order. They will do this. no

## (undated) DEVICE — SOLUTION IV IRRIG POUR BRL 0.9% SODIUM CHL 2F7124

## (undated) DEVICE — CLEANER,CAUTERY TIP,2X2",STERILE: Brand: MEDLINE

## (undated) DEVICE — PENCIL ES L3M BTTN SWCH S STL HEX LOK BLDE ELECTRD HOLSTER

## (undated) DEVICE — GLOVE SURG SZ 8 CRM LTX FREE POLYISOPRENE POLYMER BEAD ANTI

## (undated) DEVICE — Z DISCONTINUED BY MEDLINE USE 2711682 TRAY SKIN PREP DRY W/ PREM GLV

## (undated) DEVICE — SHEET,DRAPE,53X77,STERILE: Brand: MEDLINE

## (undated) DEVICE — SUTURE VCRL SZ 3-0 L27IN ABSRB UD L26MM SH 1/2 CIR J416H

## (undated) DEVICE — Z CONVERTED USE 2273164 BANDAGE COMPR W4INXL4 1/2YD E EC SGL LAYERED CLP CLSR ECONO

## (undated) DEVICE — T-DRAPE,EXTREMITY,STERILE: Brand: MEDLINE

## (undated) DEVICE — BANDAGE,GAUZE,BULKEE II,4.5"X4.1YD,STRL: Brand: MEDLINE

## (undated) DEVICE — COVER LT HNDL BLU PLAS

## (undated) DEVICE — SOLUTION SCRB 4OZ 10% POVIDONE IOD ANTIMIC BTL

## (undated) DEVICE — ELECTRODE PT RET AD L9FT HI MOIST COND ADH HYDRGEL CORDED

## (undated) DEVICE — SPONGE GZ W4XL4IN COT 12 PLY TYP VII WVN C FLD DSGN

## (undated) DEVICE — CANISTER, RIGID, 1200CC: Brand: MEDLINE INDUSTRIES, INC.

## (undated) DEVICE — MINOR SET UP PK

## (undated) DEVICE — STOCKINETTE IMPERV M 9X44IN POLY INNR LAYR COT ORTH EXT

## (undated) DEVICE — MERCY HEALTH WEST TURNOVER: Brand: MEDLINE INDUSTRIES, INC.

## (undated) DEVICE — SOLUTION PREP POVIDONE IOD FOR SKIN MUCOUS MEM PRIOR TO

## (undated) DEVICE — SPONGE LAP W18XL18IN WHT COT 4 PLY FLD STRUNG RADPQ DISP ST

## (undated) DEVICE — GOWN SIRUS NONREIN XL W/TWL: Brand: MEDLINE INDUSTRIES, INC.